# Patient Record
Sex: FEMALE | Race: WHITE | NOT HISPANIC OR LATINO | Employment: OTHER | URBAN - METROPOLITAN AREA
[De-identification: names, ages, dates, MRNs, and addresses within clinical notes are randomized per-mention and may not be internally consistent; named-entity substitution may affect disease eponyms.]

---

## 2017-01-20 ENCOUNTER — TRANSCRIBE ORDERS (OUTPATIENT)
Dept: LAB | Facility: CLINIC | Age: 82
End: 2017-01-20

## 2017-01-20 ENCOUNTER — APPOINTMENT (OUTPATIENT)
Dept: LAB | Facility: CLINIC | Age: 82
End: 2017-01-20
Payer: MEDICARE

## 2017-01-20 ENCOUNTER — GENERIC CONVERSION - ENCOUNTER (OUTPATIENT)
Dept: OTHER | Facility: OTHER | Age: 82
End: 2017-01-20

## 2017-01-20 DIAGNOSIS — I48.91 ATRIAL FIBRILLATION, UNSPECIFIED TYPE (HCC): Primary | ICD-10-CM

## 2017-01-20 LAB
INR PPP: 2.52 (ref 0.86–1.16)
PROTHROMBIN TIME: 26.8 SECONDS (ref 12–14.3)

## 2017-01-20 PROCEDURE — 85610 PROTHROMBIN TIME: CPT

## 2017-01-20 PROCEDURE — 36415 COLL VENOUS BLD VENIPUNCTURE: CPT

## 2017-02-01 ENCOUNTER — GENERIC CONVERSION - ENCOUNTER (OUTPATIENT)
Dept: OTHER | Facility: OTHER | Age: 82
End: 2017-02-01

## 2017-02-21 ENCOUNTER — ALLSCRIPTS OFFICE VISIT (OUTPATIENT)
Dept: OTHER | Facility: OTHER | Age: 82
End: 2017-02-21

## 2017-02-21 DIAGNOSIS — I48.0 PAROXYSMAL ATRIAL FIBRILLATION (HCC): ICD-10-CM

## 2017-02-21 DIAGNOSIS — E78.5 HYPERLIPIDEMIA: ICD-10-CM

## 2017-02-21 DIAGNOSIS — R42 DIZZINESS AND GIDDINESS: ICD-10-CM

## 2017-02-21 DIAGNOSIS — E55.9 VITAMIN D DEFICIENCY: ICD-10-CM

## 2017-02-21 DIAGNOSIS — I10 ESSENTIAL (PRIMARY) HYPERTENSION: ICD-10-CM

## 2017-02-21 DIAGNOSIS — D64.9 ANEMIA: ICD-10-CM

## 2017-02-21 DIAGNOSIS — I49.3 VENTRICULAR PREMATURE DEPOLARIZATION: ICD-10-CM

## 2017-03-08 ENCOUNTER — GENERIC CONVERSION - ENCOUNTER (OUTPATIENT)
Dept: OTHER | Facility: OTHER | Age: 82
End: 2017-03-08

## 2017-03-08 ENCOUNTER — LAB (OUTPATIENT)
Dept: LAB | Facility: CLINIC | Age: 82
End: 2017-03-08
Payer: MEDICARE

## 2017-03-08 ENCOUNTER — TRANSCRIBE ORDERS (OUTPATIENT)
Dept: LAB | Facility: CLINIC | Age: 82
End: 2017-03-08

## 2017-03-08 DIAGNOSIS — I10 ESSENTIAL HYPERTENSION, MALIGNANT: ICD-10-CM

## 2017-03-08 DIAGNOSIS — D64.9 ANEMIA: ICD-10-CM

## 2017-03-08 DIAGNOSIS — E78.5 HYPERLIPIDEMIA, UNSPECIFIED HYPERLIPIDEMIA TYPE: Primary | ICD-10-CM

## 2017-03-08 DIAGNOSIS — E78.5 HYPERLIPIDEMIA: ICD-10-CM

## 2017-03-08 DIAGNOSIS — I48.0 PAROXYSMAL ATRIAL FIBRILLATION (HCC): Primary | ICD-10-CM

## 2017-03-08 DIAGNOSIS — I48.0 PAROXYSMAL ATRIAL FIBRILLATION (HCC): ICD-10-CM

## 2017-03-08 DIAGNOSIS — E55.9 VITAMIN D DEFICIENCY: ICD-10-CM

## 2017-03-08 DIAGNOSIS — R42 DIZZINESS AND GIDDINESS: ICD-10-CM

## 2017-03-08 DIAGNOSIS — I49.3 VENTRICULAR PREMATURE DEPOLARIZATION: ICD-10-CM

## 2017-03-08 DIAGNOSIS — I10 ESSENTIAL (PRIMARY) HYPERTENSION: ICD-10-CM

## 2017-03-08 LAB
25(OH)D3 SERPL-MCNC: 14.5 NG/ML (ref 30–100)
ALBUMIN SERPL BCP-MCNC: 3.7 G/DL (ref 3.5–5)
ALP SERPL-CCNC: 138 U/L (ref 46–116)
ALT SERPL W P-5'-P-CCNC: 24 U/L (ref 12–78)
ANION GAP SERPL CALCULATED.3IONS-SCNC: 9 MMOL/L (ref 4–13)
AST SERPL W P-5'-P-CCNC: 19 U/L (ref 5–45)
BACTERIA UR QL AUTO: NORMAL /HPF
BASOPHILS # BLD AUTO: 0.06 THOUSANDS/ΜL (ref 0–0.1)
BASOPHILS NFR BLD AUTO: 1 % (ref 0–1)
BILIRUB SERPL-MCNC: 0.47 MG/DL (ref 0.2–1)
BILIRUB UR QL STRIP: NEGATIVE
BUN SERPL-MCNC: 21 MG/DL (ref 5–25)
CALCIUM SERPL-MCNC: 8.9 MG/DL (ref 8.3–10.1)
CHLORIDE SERPL-SCNC: 106 MMOL/L (ref 100–108)
CHOLEST SERPL-MCNC: 250 MG/DL (ref 50–200)
CLARITY UR: CLEAR
CO2 SERPL-SCNC: 25 MMOL/L (ref 21–32)
COLOR UR: YELLOW
CREAT SERPL-MCNC: 0.88 MG/DL (ref 0.6–1.3)
DIGOXIN SERPL-MCNC: 0.6 NG/ML (ref 0.8–2)
EOSINOPHIL # BLD AUTO: 0.17 THOUSAND/ΜL (ref 0–0.61)
EOSINOPHIL NFR BLD AUTO: 2 % (ref 0–6)
ERYTHROCYTE [DISTWIDTH] IN BLOOD BY AUTOMATED COUNT: 15.2 % (ref 11.6–15.1)
GFR SERPL CREATININE-BSD FRML MDRD: >60 ML/MIN/1.73SQ M
GLUCOSE SERPL-MCNC: 105 MG/DL (ref 65–140)
GLUCOSE UR STRIP-MCNC: NEGATIVE MG/DL
HCT VFR BLD AUTO: 42.7 % (ref 34.8–46.1)
HDLC SERPL-MCNC: 76 MG/DL (ref 40–60)
HGB BLD-MCNC: 13.8 G/DL (ref 11.5–15.4)
HGB UR QL STRIP.AUTO: ABNORMAL
HYALINE CASTS #/AREA URNS LPF: NORMAL /LPF
INR PPP: 1.86 (ref 0.86–1.16)
KETONES UR STRIP-MCNC: NEGATIVE MG/DL
LDLC SERPL CALC-MCNC: 153 MG/DL (ref 0–100)
LEUKOCYTE ESTERASE UR QL STRIP: NEGATIVE
LYMPHOCYTES # BLD AUTO: 1.7 THOUSANDS/ΜL (ref 0.6–4.47)
LYMPHOCYTES NFR BLD AUTO: 19 % (ref 14–44)
MCH RBC QN AUTO: 29 PG (ref 26.8–34.3)
MCHC RBC AUTO-ENTMCNC: 32.3 G/DL (ref 31.4–37.4)
MCV RBC AUTO: 90 FL (ref 82–98)
MONOCYTES # BLD AUTO: 0.76 THOUSAND/ΜL (ref 0.17–1.22)
MONOCYTES NFR BLD AUTO: 9 % (ref 4–12)
NEUTROPHILS # BLD AUTO: 6.08 THOUSANDS/ΜL (ref 1.85–7.62)
NEUTS SEG NFR BLD AUTO: 69 % (ref 43–75)
NITRITE UR QL STRIP: NEGATIVE
NON-SQ EPI CELLS URNS QL MICRO: NORMAL /HPF
NRBC BLD AUTO-RTO: 0 /100 WBCS
PH UR STRIP.AUTO: 6 [PH] (ref 4.5–8)
PLATELET # BLD AUTO: 260 THOUSANDS/UL (ref 149–390)
PMV BLD AUTO: 11.4 FL (ref 8.9–12.7)
POTASSIUM SERPL-SCNC: 4.3 MMOL/L (ref 3.5–5.3)
PROT SERPL-MCNC: 7.3 G/DL (ref 6.4–8.2)
PROT UR STRIP-MCNC: NEGATIVE MG/DL
PROTHROMBIN TIME: 21.3 SECONDS (ref 12–14.3)
RBC # BLD AUTO: 4.76 MILLION/UL (ref 3.81–5.12)
RBC #/AREA URNS AUTO: NORMAL /HPF
SODIUM SERPL-SCNC: 140 MMOL/L (ref 136–145)
SP GR UR STRIP.AUTO: 1.01 (ref 1–1.03)
TRIGL SERPL-MCNC: 104 MG/DL
TSH SERPL DL<=0.05 MIU/L-ACNC: 2.8 UIU/ML (ref 0.36–3.74)
UROBILINOGEN UR QL STRIP.AUTO: 0.2 E.U./DL
WBC # BLD AUTO: 8.8 THOUSAND/UL (ref 4.31–10.16)
WBC #/AREA URNS AUTO: NORMAL /HPF

## 2017-03-08 PROCEDURE — 81001 URINALYSIS AUTO W/SCOPE: CPT

## 2017-03-08 PROCEDURE — 80053 COMPREHEN METABOLIC PANEL: CPT

## 2017-03-08 PROCEDURE — 84443 ASSAY THYROID STIM HORMONE: CPT

## 2017-03-08 PROCEDURE — 80162 ASSAY OF DIGOXIN TOTAL: CPT

## 2017-03-08 PROCEDURE — 80061 LIPID PANEL: CPT

## 2017-03-08 PROCEDURE — 85025 COMPLETE CBC W/AUTO DIFF WBC: CPT

## 2017-03-08 PROCEDURE — 36415 COLL VENOUS BLD VENIPUNCTURE: CPT

## 2017-03-08 PROCEDURE — 85610 PROTHROMBIN TIME: CPT

## 2017-03-08 PROCEDURE — 82306 VITAMIN D 25 HYDROXY: CPT

## 2017-04-03 ENCOUNTER — APPOINTMENT (OUTPATIENT)
Dept: LAB | Facility: CLINIC | Age: 82
End: 2017-04-03
Payer: MEDICARE

## 2017-04-03 ENCOUNTER — GENERIC CONVERSION - ENCOUNTER (OUTPATIENT)
Dept: OTHER | Facility: OTHER | Age: 82
End: 2017-04-03

## 2017-04-03 ENCOUNTER — LAB CONVERSION - ENCOUNTER (OUTPATIENT)
Dept: OTHER | Facility: OTHER | Age: 82
End: 2017-04-03

## 2017-04-03 DIAGNOSIS — I48.0 PAROXYSMAL ATRIAL FIBRILLATION (HCC): ICD-10-CM

## 2017-04-03 LAB
INR PPP: 1.84 (ref 0.86–1.16)
PROTHROMBIN TIME: 21.1 SECONDS (ref 12–14.3)

## 2017-04-03 PROCEDURE — 36415 COLL VENOUS BLD VENIPUNCTURE: CPT

## 2017-04-03 PROCEDURE — 85610 PROTHROMBIN TIME: CPT

## 2017-05-08 ENCOUNTER — GENERIC CONVERSION - ENCOUNTER (OUTPATIENT)
Dept: OTHER | Facility: OTHER | Age: 82
End: 2017-05-08

## 2017-05-08 ENCOUNTER — TRANSCRIBE ORDERS (OUTPATIENT)
Dept: LAB | Facility: CLINIC | Age: 82
End: 2017-05-08

## 2017-05-08 ENCOUNTER — APPOINTMENT (OUTPATIENT)
Dept: LAB | Facility: CLINIC | Age: 82
End: 2017-05-08
Payer: MEDICARE

## 2017-05-08 DIAGNOSIS — I48.0 PAROXYSMAL ATRIAL FIBRILLATION (HCC): Primary | ICD-10-CM

## 2017-05-08 DIAGNOSIS — I48.0 PAROXYSMAL ATRIAL FIBRILLATION (HCC): ICD-10-CM

## 2017-05-08 LAB
INR PPP: 1.57 (ref 0.86–1.16)
PROTHROMBIN TIME: 18.9 SECONDS (ref 12.1–14.4)

## 2017-05-08 PROCEDURE — 36415 COLL VENOUS BLD VENIPUNCTURE: CPT

## 2017-05-08 PROCEDURE — 85610 PROTHROMBIN TIME: CPT

## 2017-05-09 ENCOUNTER — GENERIC CONVERSION - ENCOUNTER (OUTPATIENT)
Dept: OTHER | Facility: OTHER | Age: 82
End: 2017-05-09

## 2017-05-16 ENCOUNTER — ALLSCRIPTS OFFICE VISIT (OUTPATIENT)
Dept: OTHER | Facility: OTHER | Age: 82
End: 2017-05-16

## 2017-06-11 ENCOUNTER — APPOINTMENT (EMERGENCY)
Dept: RADIOLOGY | Facility: HOSPITAL | Age: 82
End: 2017-06-11
Payer: MEDICARE

## 2017-06-11 ENCOUNTER — HOSPITAL ENCOUNTER (EMERGENCY)
Facility: HOSPITAL | Age: 82
Discharge: HOME | End: 2017-06-11
Attending: EMERGENCY MEDICINE
Payer: MEDICARE

## 2017-06-11 VITALS
SYSTOLIC BLOOD PRESSURE: 136 MMHG | DIASTOLIC BLOOD PRESSURE: 87 MMHG | WEIGHT: 132.06 LBS | RESPIRATION RATE: 17 BRPM | TEMPERATURE: 98.9 F | OXYGEN SATURATION: 95 % | HEART RATE: 82 BPM

## 2017-06-11 DIAGNOSIS — R00.2 PALPITATIONS: Primary | ICD-10-CM

## 2017-06-11 DIAGNOSIS — I48.91 ATRIAL FIBRILLATION (HCC): ICD-10-CM

## 2017-06-11 LAB
ANION GAP SERPL CALCULATED.3IONS-SCNC: 10 MMOL/L (ref 4–13)
BASOPHILS # BLD AUTO: 0.06 THOUSANDS/ΜL (ref 0–0.1)
BASOPHILS NFR BLD AUTO: 1 % (ref 0–1)
BUN SERPL-MCNC: 23 MG/DL (ref 5–25)
CALCIUM SERPL-MCNC: 8.6 MG/DL (ref 8.3–10.1)
CHLORIDE SERPL-SCNC: 108 MMOL/L (ref 100–108)
CO2 SERPL-SCNC: 25 MMOL/L (ref 21–32)
CREAT SERPL-MCNC: 0.9 MG/DL (ref 0.6–1.3)
DIGOXIN SERPL-MCNC: 0.4 NG/ML (ref 0.8–2)
EOSINOPHIL # BLD AUTO: 0.3 THOUSAND/ΜL (ref 0–0.61)
EOSINOPHIL NFR BLD AUTO: 4 % (ref 0–6)
ERYTHROCYTE [DISTWIDTH] IN BLOOD BY AUTOMATED COUNT: 15.2 % (ref 11.6–15.1)
GFR SERPL CREATININE-BSD FRML MDRD: 59.4 ML/MIN/1.73SQ M
GLUCOSE SERPL-MCNC: 113 MG/DL (ref 65–140)
HCT VFR BLD AUTO: 41.6 % (ref 34.8–46.1)
HGB BLD-MCNC: 14.1 G/DL (ref 11.5–15.4)
LYMPHOCYTES # BLD AUTO: 2.26 THOUSANDS/ΜL (ref 0.6–4.47)
LYMPHOCYTES NFR BLD AUTO: 28 % (ref 14–44)
MCH RBC QN AUTO: 29.9 PG (ref 26.8–34.3)
MCHC RBC AUTO-ENTMCNC: 33.9 G/DL (ref 31.4–37.4)
MCV RBC AUTO: 88 FL (ref 82–98)
MONOCYTES # BLD AUTO: 0.74 THOUSAND/ΜL (ref 0.17–1.22)
MONOCYTES NFR BLD AUTO: 9 % (ref 4–12)
NEUTROPHILS # BLD AUTO: 4.65 THOUSANDS/ΜL (ref 1.85–7.62)
NEUTS SEG NFR BLD AUTO: 58 % (ref 43–75)
NRBC BLD AUTO-RTO: 0 /100 WBCS
PLATELET # BLD AUTO: 281 THOUSANDS/UL (ref 149–390)
PMV BLD AUTO: 12.1 FL (ref 8.9–12.7)
POTASSIUM SERPL-SCNC: 4.1 MMOL/L (ref 3.5–5.3)
RBC # BLD AUTO: 4.71 MILLION/UL (ref 3.81–5.12)
SODIUM SERPL-SCNC: 143 MMOL/L (ref 136–145)
SPECIMEN SOURCE: NORMAL
SPECIMEN SOURCE: NORMAL
TROPONIN I BLD-MCNC: 0 NG/ML (ref 0–0.08)
TROPONIN I BLD-MCNC: 0 NG/ML (ref 0–0.08)
WBC # BLD AUTO: 8.01 THOUSAND/UL (ref 4.31–10.16)

## 2017-06-11 PROCEDURE — 80162 ASSAY OF DIGOXIN TOTAL: CPT | Performed by: EMERGENCY MEDICINE

## 2017-06-11 PROCEDURE — 99285 EMERGENCY DEPT VISIT HI MDM: CPT

## 2017-06-11 PROCEDURE — 71020 HB CHEST X-RAY 2VW FRONTAL&LATL: CPT

## 2017-06-11 PROCEDURE — 84484 ASSAY OF TROPONIN QUANT: CPT

## 2017-06-11 PROCEDURE — 93005 ELECTROCARDIOGRAM TRACING: CPT | Performed by: EMERGENCY MEDICINE

## 2017-06-11 PROCEDURE — 85025 COMPLETE CBC W/AUTO DIFF WBC: CPT | Performed by: EMERGENCY MEDICINE

## 2017-06-11 PROCEDURE — 80048 BASIC METABOLIC PNL TOTAL CA: CPT | Performed by: EMERGENCY MEDICINE

## 2017-06-11 PROCEDURE — 36415 COLL VENOUS BLD VENIPUNCTURE: CPT | Performed by: EMERGENCY MEDICINE

## 2017-06-11 RX ORDER — BISOPROLOL FUMARATE 5 MG/1
TABLET ORAL
COMMUNITY
Start: 2016-05-10 | End: 2018-03-13 | Stop reason: SDUPTHER

## 2017-06-11 RX ORDER — DIGOXIN 125 MCG
TABLET ORAL
COMMUNITY
Start: 2012-01-05 | End: 2019-03-09 | Stop reason: SDUPTHER

## 2017-06-11 RX ORDER — PRAVASTATIN SODIUM 20 MG
TABLET ORAL
COMMUNITY
Start: 2012-01-31 | End: 2018-12-11 | Stop reason: SDUPTHER

## 2017-06-11 RX ORDER — OMEPRAZOLE 40 MG/1
CAPSULE, DELAYED RELEASE ORAL
COMMUNITY
Start: 2015-06-16 | End: 2018-09-26 | Stop reason: SDUPTHER

## 2017-06-12 LAB
QRS AXIS: 27 DEGREES
QRS AXIS: 35 DEGREES
QRSD INTERVAL: 74 MS
QRSD INTERVAL: 76 MS
QT INTERVAL: 334 MS
QT INTERVAL: 370 MS
QTC INTERVAL: 397 MS
QTC INTERVAL: 402 MS
T WAVE AXIS: 60 DEGREES
T WAVE AXIS: 61 DEGREES
VENTRICULAR RATE: 71 BPM
VENTRICULAR RATE: 85 BPM

## 2017-06-23 DIAGNOSIS — R53.83 OTHER FATIGUE: ICD-10-CM

## 2017-06-28 ENCOUNTER — APPOINTMENT (OUTPATIENT)
Dept: LAB | Facility: CLINIC | Age: 82
End: 2017-06-28
Payer: MEDICARE

## 2017-06-28 ENCOUNTER — TRANSCRIBE ORDERS (OUTPATIENT)
Dept: LAB | Facility: CLINIC | Age: 82
End: 2017-06-28

## 2017-06-28 DIAGNOSIS — R53.83 OTHER FATIGUE: ICD-10-CM

## 2017-06-28 LAB
ERYTHROCYTE [DISTWIDTH] IN BLOOD BY AUTOMATED COUNT: 15.1 % (ref 11.6–15.1)
HCT VFR BLD AUTO: 40.5 % (ref 34.8–46.1)
HGB BLD-MCNC: 12.9 G/DL (ref 11.5–15.4)
MCH RBC QN AUTO: 28.8 PG (ref 26.8–34.3)
MCHC RBC AUTO-ENTMCNC: 31.9 G/DL (ref 31.4–37.4)
MCV RBC AUTO: 90 FL (ref 82–98)
PLATELET # BLD AUTO: 250 THOUSANDS/UL (ref 149–390)
PMV BLD AUTO: 11.6 FL (ref 8.9–12.7)
RBC # BLD AUTO: 4.48 MILLION/UL (ref 3.81–5.12)
WBC # BLD AUTO: 8.21 THOUSAND/UL (ref 4.31–10.16)

## 2017-06-28 PROCEDURE — 85027 COMPLETE CBC AUTOMATED: CPT

## 2017-06-28 PROCEDURE — 36415 COLL VENOUS BLD VENIPUNCTURE: CPT

## 2017-07-03 ENCOUNTER — GENERIC CONVERSION - ENCOUNTER (OUTPATIENT)
Dept: OTHER | Facility: OTHER | Age: 82
End: 2017-07-03

## 2017-08-22 ENCOUNTER — ALLSCRIPTS OFFICE VISIT (OUTPATIENT)
Dept: OTHER | Facility: OTHER | Age: 82
End: 2017-08-22

## 2017-09-01 DIAGNOSIS — E78.5 HYPERLIPIDEMIA: ICD-10-CM

## 2017-09-01 DIAGNOSIS — I48.0 PAROXYSMAL ATRIAL FIBRILLATION (HCC): ICD-10-CM

## 2017-09-12 ENCOUNTER — GENERIC CONVERSION - ENCOUNTER (OUTPATIENT)
Dept: OTHER | Facility: OTHER | Age: 82
End: 2017-09-12

## 2017-09-18 ENCOUNTER — GENERIC CONVERSION - ENCOUNTER (OUTPATIENT)
Dept: OTHER | Facility: OTHER | Age: 82
End: 2017-09-18

## 2017-11-16 ENCOUNTER — TRANSCRIBE ORDERS (OUTPATIENT)
Dept: LAB | Facility: HOSPITAL | Age: 82
End: 2017-11-16

## 2017-11-16 DIAGNOSIS — E78.5 HYPERLIPIDEMIA, UNSPECIFIED HYPERLIPIDEMIA TYPE: Primary | ICD-10-CM

## 2017-11-20 ENCOUNTER — APPOINTMENT (OUTPATIENT)
Dept: LAB | Facility: HOSPITAL | Age: 82
End: 2017-11-20
Attending: INTERNAL MEDICINE
Payer: MEDICARE

## 2017-11-20 DIAGNOSIS — E78.5 HYPERLIPIDEMIA, UNSPECIFIED HYPERLIPIDEMIA TYPE: ICD-10-CM

## 2017-11-20 DIAGNOSIS — I48.0 PAROXYSMAL ATRIAL FIBRILLATION (HCC): ICD-10-CM

## 2017-11-20 DIAGNOSIS — E78.5 HYPERLIPIDEMIA: ICD-10-CM

## 2017-11-20 LAB
AST SERPL W P-5'-P-CCNC: 20 U/L (ref 5–45)
DIGOXIN SERPL-MCNC: 0.6 NG/ML (ref 0.8–2)
LDLC SERPL DIRECT ASSAY-MCNC: 122 MG/DL (ref 0–100)
VENIPUNCTURE: NORMAL

## 2017-11-20 PROCEDURE — 80162 ASSAY OF DIGOXIN TOTAL: CPT

## 2017-11-20 PROCEDURE — 84450 TRANSFERASE (AST) (SGOT): CPT

## 2017-11-20 PROCEDURE — 36415 COLL VENOUS BLD VENIPUNCTURE: CPT

## 2017-11-20 PROCEDURE — 83721 ASSAY OF BLOOD LIPOPROTEIN: CPT

## 2017-11-24 ENCOUNTER — GENERIC CONVERSION - ENCOUNTER (OUTPATIENT)
Dept: OTHER | Facility: OTHER | Age: 82
End: 2017-11-24

## 2017-12-01 ENCOUNTER — ALLSCRIPTS OFFICE VISIT (OUTPATIENT)
Dept: OTHER | Facility: OTHER | Age: 82
End: 2017-12-01

## 2017-12-05 NOTE — PROGRESS NOTES
Assessment  Assessed    1  Paroxysmal atrial fibrillation (427 31) (I48 0)   2  Palpitations (785 1) (R00 2)   3  Hypertension (401 9) (I10)   4  Hyperlipidemia (272 4) (E78 5)    Plan  Paroxysmal atrial fibrillation    · Follow-up visit in 6 months Evaluation and Treatment  Follow-up  Status: Hold For -Scheduling  Requested for: 05CVW8785   Ordered;Paroxysmal atrial fibrillation; Ordered By: Travis Gilmore Performed:  Due: 97AGN1517    Discussion/Summary  Cardiology Discussion Summary Free Text Note Form [facility]: It is my impression the patient has labile blood pressure with a fair amount of symptoms when her blood pressure is low normal  She will continue bisoprolol prn SBP >150  She is not having many palpitations currently  She will continue pravastatin for hyperlipidemia  Her LDL was reasonable at 122  Her digoxin level was 0 6  She will continue Eliquis 2 5 mg BID for stroke prevention  I will see her again in 6 months time  Chief Complaint  Chief Complaint Free Text Note Form: The patient is here for a six-month followup for her paroxysmal A  fib, hypertension, hyperlipidemia and palpitations  Chief Complaint Chronic Condition [facility]: Patient is here today for follow up of chronic conditions described in HPI  History of Present Illness  Cardiology HPI Free Text Note Form [facility]: She does get palpitations which are rare  She does not feel her HR even if it in the low 100s  She is using bisoprolol prn BP>150  She is taking it about 2-3x per week  Her retinal exam did show some bleeding indicating an elevated BP  She denies chest pain or SOB  He denies much in the way of edema  Review of Systems  Cardiology Female ROS:    Cardiac: rhythm problems-- and-- palpitations present , but-- no chest pain,-- no fainting/blackouts,-- no heart murmur present-- and-- no signs of swelling  Skin: No complaints of nonhealing sores or skin rash    Genitourinary: frequent urination at night-- and-- post menopausal, but-- no recurrent urinary tract infections,-- no difficult urination,-- no blood in urine,-- no kidney stones,-- no loss of bladder control,-- no kidney problems,-- no birth control or hormone replacement therapy-- and-- not pregnant  Psychological: No complaints of feeling depressed, anxiety, panic attacks, or difficulty concentrating  General: lack of energy/fatigue, but-- No complaints of trouble sleeping, lack of energy, fatigue, appetite changes, weight changes, fever, frequent infections, or night sweats  Respiratory: No complaints of shortness of breath, cough with sputum, or wheezing  HEENT: serious eye problems-- and-- hearing problems, but-- no nose problems,-- no throat problems-- and-- no snoring  Gastrointestinal: No complaints of liver problems, nausea, vomiting, heartburn, constipation, bloody stools, diarrhea, problems swallowing, adbominal pain, or rectal bleeding  Hematologic: No complaints of bleeding disorders, anemia, blood clots, or excessive brusing  Neurological: No complaints of numbness, tingling, dizziness, weakness, seizures, headaches, syncope or fainting, AM fatigue, daytime sleepiness, no witnessed apnea episodes  Musculoskeletal: arthritis, but-- no back pain   ROS Reviewed:   ROS reviewed  Active Problems  Problems    1  Abnormal movement in bone (733 90) (M89 9)   2  Acute pain (338 19) (R52)   3  Alopecia (704 00) (L65 9)   4  Anemia (285 9) (D64 9)   5  Asthma (493 90) (J45 909)   6  Bilateral carpal tunnel syndrome (354 0) (G56 03)   7  Diarrhea (787 91) (R19 7)   8  Edema (782 3) (R60 9)   9  Esophageal reflux (530 81) (K21 9)   10  Fatigue (780 79) (R53 83)   11  Fecal occult blood test positive (792 1) (R19 5)   12  Flu vaccine need (V04 81) (Z23)   13  Hyperlipidemia (272 4) (E78 5)   14  Hypertension (401 9) (I10)   15  Influenza vaccine needed (V04 81) (Z23)   16  Lightheadedness (780 4) (R42)   17  Palpitations (785 1) (R00 2)   18   Paroxysmal atrial fibrillation (427 31) (I48 0)   19  Premature ventricular contraction (427 69) (I49 3)   20  Vitamin D deficiency (268 9) (E55 9)    Past Medical History  Problems    1  History of Fracture Of The Ankle (824 8)   2  History of Osteoarthritis (V13 4)   3  Screening for genitourinary condition (V81 6) (Z13 89)   4  Screening for other and unspecified genitourinary condition (V81 6) (Z13 89)  Active Problems And Past Medical History Reviewed: The active problems and past medical history were reviewed and updated today  Surgical History  Problems    1  History of Pelvic Repair  Surgical History Reviewed: The surgical history was reviewed and updated today  Family History  Family History    1  Family history of supraventricular tachycardia (V17 49) (Z82 49)  Family History Reviewed: The family history was reviewed and updated today  Social History  Problems    · Being A Social Drinker   · Never a smoker  Social History Reviewed: The social history was reviewed and updated today  The social history was reviewed and is unchanged  Current Meds   1  Advair Diskus 250-50 MCG/DOSE Inhalation Aerosol Powder Breath Activated; INHALE ONE PUFF BY MOUTH TWICE A DAY; Therapy: 55MKD4061 to (Evaluate:19Mar2017)  Requested for: 12ETW1011; Last Rx:20Oct2016 Ordered   2  Bisoprolol Fumarate 5 MG Oral Tablet; TAKE ONE-HALF (1/2) TABLET DAILY AS NEEDED FOR PALPITATIONS; Therapy: 73OYL7651 to (Last Rx:54Rcq8650)  Requested for: 07Vhf0389 Ordered   3  Digoxin 125 MCG Oral Tablet; take 1 tablet by mouth one time daily; Therapy: 47LVC2282 to (Evaluate:15Oct2017)  Requested for: 95CXF3682; Last Rx:20Oct2016 Ordered   4  Eliquis 2 5 MG Oral Tablet; One Tablet PO BID; Therapy: 04XGS5853 to (Evaluate:11May2018)  Requested for: 43QAG7485; Last Rx:16May2017 Ordered   5  Fluticasone Propionate 50 MCG/ACT Nasal Suspension; USE 1 TO 2 SPRAYS IN EACH NOSTRIL ONCE DAILY;  Therapy: 40VDW2085 to (Evaluate:15Oct2017) Requested for: 17DMC0670; Last Rx:74Iqo5980 Ordered   6  HydroCHLOROthiazide 12 5 MG Oral Tablet; take 1 tablet daily prn; Therapy: 16Efm1084 to (Last Rx:61Jpm2332)  Requested for: 14FUP0462 Ordered   7  Omeprazole 40 MG Oral Capsule Delayed Release; 1 tablet daily; Therapy: 98XQW7589 to (Evaluate:38Qkc6043)  Requested for: 81VSZ4545; Last Rx:82Amx2615 Ordered   8  Pravastatin Sodium 20 MG Oral Tablet; TAKE 1 TABLET DAILY; Therapy: 08VQU9412 to 062 439 31 49)  Requested for: 06ZIF6476; Last Rx:61Jvw9666 Ordered  Medication List Reviewed: The medication list was reviewed and updated today  Allergies  Medication    1  No Known Drug Allergies    Vitals  Vital Signs    Recorded: 99FLG3257 11:23AM   Heart Rate 55   Systolic 499   Diastolic 80   Weight 974 lb 8 oz   BMI Calculated 22 59   BSA Calculated 1 6       Physical Exam   Constitutional  General appearance: No acute distress, well appearing and well nourished  Eyes  Conjunctiva and Sclera examination: Conjunctiva pink, sclera anicteric  Ears, Nose, Mouth, and Throat - Oropharynx: Clear, nares are clear, mucous membranes are moist   Neck  Neck and thyroid: Normal, supple, trachea midline, no thyromegaly  Pulmonary  Auscultation of lungs: Clear to auscultation, no rales, no rhonchi, no wheezing, good air movement  Cardiovascular  Auscultation of heart: Abnormal  -- premature beats with Grade 1 systolic murmur at the base  No diastolic murmur rub or gallop noted  Carotid pulses: Normal, 2+ bilaterally  Pedal pulses: Normal, 2+ bilaterally  Examination of extremities for edema and/or varicosities: Abnormal  -- trace to +1 edema of the LEs  Abdomen  Abdomen: Non-tender and no distention  Liver and spleen: No hepatomegaly or splenomegaly  Musculoskeletal Gait and station: Normal gait  -- Digits and nails: Normal without clubbing or cyanosis  -- Inspection/palpation of joints, bones, and muscles: Abnormal -- kyphosis    Skin - Skin and subcutaneous tissue: Normal without rashes or lesions  Skin is warm and well perfused, normal turgor  Neurologic - Cranial nerves: II - XII intact  -- Sensation: No sensory loss  Psychiatric - Orientation to person, place, and time: Normal -- Mood and affect: Normal       Future Appointments    Date/Time Provider Specialty Site   01/04/2018 10:40 AM DESTINEE Fernandes   Internal Medicine [facility]       Signatures   Electronically signed by : DESTINEE Randall ; Dec  1 2017 12:12PM EST                       (Author)

## 2018-01-10 NOTE — PROGRESS NOTES
REPORT NAME: Patient Visit Summary Report   VISIT DATE: 4/19/2016  VISIT TIME: 10:56 AM EDT  PATIENT NAME: Ruthie Becker  MEDICAL RECORD NUMBER: [id number]  SOCIAL SECURITY NUMBER:   YOB: 1930  AGE: 80  REFERRING PHYSICIAN: Jennifer Talamantes MD  SUPERVISING CLINICIAN: Jennifer Talamantes MD  HEALTH CARE PROFESSIONAL: Lloyd Reinoso  PATIENT HOME ADDRESS: [address] [place] [address] Road PHONE: [phone]  DIAGNOSIS 1: Unspecified atrial fibrillation / I48 91  DIAGNOSIS 2:   DIAGNOSIS 3:   DIAGNOSIS 4:   INR RANGE: 2 - 3  INR GOAL: 2 5  TREATMENT START DATE: 1/26/2012  TREATMENT END DATE:   NEXT VISIT: 5/17/2016  [facility]    VISIT RESULTS   ENCOUNTER NUMBER:   TEST LOCATION: [facility]  TEST TYPE: Outside Lab (Venipuncture)  VISIT TYPE: Phone Consult  CURRENT INR: 2 01 PROTIME: 21 9  SPECIMEN COL AND RPT DATE: 4/19/2016 10:56  AM EDT    VITAL SIGNS  PULSE:  B/P:  WEIGHT:  HEIGHT:  TEMP:     CURRENT ANTICOAGULANT DOSING SCHEDULE  DOSE SIZE: 5mg    ANTICOAGULANT TYPE: WARFARIN  DOSING REGIMEN  Sun       Mon Tues Wed Thurs Fri       Sat  Total/Wk  2 5       5         2 5       5         2 5       2 5       5         25    PATIENT MEDICATION INSTRUCTION: Yes  PATIENT NUTRITIONAL COUNSELING: No  PATIENT BRUISING INSTRUCTION: Yes      LAST EDUCATION DATE:       PREVIOUS VISIT INFORMATION  VISITDATE   INR Goal  INR   Sun     Mon Tues Wed Thurs   Fri  Sat     Total/wk  4/19/2016   2 5       2 01  2 5     5       2 5     5       2 5     2 5  5       25  3/22/2016   2 5       2 22  2 5     5       2 5     5       2 5     2 5  5       25  2/19/2016   2 5       2 01  2 5     5       2 5     5       2 5     2 5  5       25  2/4/2016    2 5       1 97  2 5     5       2 5     5       2 5     2 5  5       25    ADDITIONAL PREVIOUS VISIT INFORMATION  VISITDATE   PRIMARY RX               DOSE      CrCl  4/19/2016   WARFARIN 5mg                 3/22/2016   WARFARIN                 5mg                 2/19/2016   WARFARIN                 5mg                 2/4/2016    WARFARIN                 5mg                     OTHER CURRENT MEDICATIONS: WARFARIN      PROGRESS NOTES:     PATIENT INSTRUCTIONS: 4/20/16, tc pt, cont current dose, rech 4 weeks,  5/17  [facility]  TEST LOCATION: [facility]    Electronically signed by:  London Oreilly on 4/20/2016 at 10:56 AM EDT

## 2018-01-10 NOTE — PROGRESS NOTES
REPORT NAME: Patient Visit Summary Report   VISIT DATE: 2/4/2016  VISIT TIME: 3:32 PM EST  PATIENT NAME: Tomás Robbins  MEDICAL RECORD NUMBER: [id number]  SOCIAL SECURITY NUMBER:   YOB: 1930  AGE: 80  REFERRING PHYSICIAN: Darryl Gordon MD  SUPERVISING CLINICIAN: Darryl Gordon MD  HEALTH CARE PROFESSIONAL: Romana Pih [facility]  PATIENT HOME ADDRESS: [address] [place] [address], [place], [facility] [address] PHONE: [phone]  DIAGNOSIS 1: Unspecified atrial fibrillation / I48 91  DIAGNOSIS 2:   DIAGNOSIS 3:   DIAGNOSIS 4:   INR RANGE: 2 - 3  INR GOAL: 2 5  TREATMENT START DATE: 1/26/2012  TREATMENT END DATE:   NEXT VISIT: 2/18/2016  [facility]    VISIT RESULTS   ENCOUNTER NUMBER:   TEST LOCATION: [facility]  TEST TYPE: Outside Lab (Venipuncture)  VISIT TYPE: Phone Consult  CURRENT INR: 1 97 PROTIME: 21 6  SPECIMEN COL AND RPT DATE: 2/4/2016 3:32  PM EST    VITAL SIGNS  PULSE:  B/P:  WEIGHT:  HEIGHT:  TEMP:     CURRENT ANTICOAGULANT DOSING SCHEDULE  DOSE SIZE: 5mg    ANTICOAGULANT TYPE: WARFARIN  DOSING REGIMEN  Sun       Mon       Tues      Wed       Thurs     Fri       Sat  Total/Wk  2 5       5         2 5       5         2 5       2 5       5         25    PATIENT MEDICATION INSTRUCTION: Yes  PATIENT NUTRITIONAL COUNSELING: No  PATIENT BRUISING INSTRUCTION: No      LAST EDUCATION DATE:       PREVIOUS VISIT INFORMATION  VISITDATE   INR Goal  INR   Sun     Mon     Tues    Wed     Thurs   Fri  Sat     Total/wk  2/4/2016    2 5       1 97  2 5     5       2 5     5       2 5     2 5  5       25  1/8/2016    2 5       2 45  2 5     5       2 5     5       2 5     2 5  5       25  12/3/2015   2 5       2 09  2 5     5       2 5     5       2 5     2 5  5       25  11/18/2015  2 5       2 24  2 5     5       2 5     5       2 5     2 5  5       25    ADDITIONAL PREVIOUS VISIT INFORMATION  VISITDATE   PRIMARY RX               DOSE      CrCl  2/4/2016    WARFARIN 5mg                 1/8/2016    WARFARIN                 5mg                 12/3/2015   WARFARIN                 5mg                 11/18/2015  WARFARIN                 5mg                     OTHER CURRENT MEDICATIONS: WARFARIN      PROGRESS NOTES:     PATIENT INSTRUCTIONS: 2/12/16, inr results from 2/4/16 were not received  tc pt, cont current dose, rech 1 week, 2/18  [facility]  TEST LOCATION: [facility]    Electronically signed by:  Chalo Oreilly on 2/12/2016 at 3:32 PM EST

## 2018-01-11 NOTE — PROGRESS NOTES
REPORT NAME: Progress Notes Report  VISIT DATE: 5/8/2017  VISIT TIME: 8:52 AM EDT  PATIENT NAME: Milly Love  MEDICAL RECORD NUMBER: [id number]  YOB: 1930  AGE: 80  REFERRING PHYSICIAN: Carlee Hood MD  SUPERVISING CLINICIAN: Carlee Hood MD  HEALTH CARE PROVIDER: Nyla Andrews [facility]  PATIENT HOME ADDRESS: [address] [place] [address], [address] [place] [address] Street PHONE: [phone]  SOCIAL SECURITY NUMBER:   DIAGNOSIS 1: Unspecified atrial fibrillation / I48 91  DIAGNOSIS 2:   INR RANGE: 2 - 3  INR GOAL: 2 5  TREATMENT START DATE: 1/26/2012  TREATMENT END DATE:   NEXT VISIT: 5/23/2017  [facility]  VISIT RESULTS  ENCOUNTER NUMBER:   TEST LOCATION: [facility]  TEST TYPE: Outside Lab (Venipuncture)  VISIT TYPE: Phone Consult  CURRENT INR: 1 57 PROTIME: 18 9  SPECIMEN COL AND RPT DATE: 5/8/2017 8:52  AM EDT  VITAL SIGNS  PULSE:  BP: / WEIGHT:  HEIGHT:  TEMP:   CURRENT ANTICOAGULANT DOSING SCHEDULE  DOSE SIZE: 5mg    ANTICOAGULANT TYPE: WARFARIN  DOSING REGIMEN  Sun       Mon       Tues      Wed       Thurs     Fri       Sat  Total/Wk  2 5       5         5         5         2 5       5         5         30  PATIENT MEDICATION INSTRUCTION: Yes  PATIENT NUTRITIONAL COUNSELING: No  PATIENT BRUISING INSTRUCTION: No  LAST EDUCATION DATE:   PREVIOUS VISIT INFORMATION  VISITDATE  INRGoal INR   Sun    Mon Tues Wed Thurs Fri    Sat  Total/wk  5/8/2017    2 5     1 57  2 5    5      5      5      2 5    5      5  30  4/3/2017    2 5     1 84  2 5    5      2 5    5      2 5    5      5  27 5  3/8/2017    2 5     1 86  2 5    5      2 5    5      2 5    2 5    5  25  1/20/2017   2 5     2 52  2 5    5      2 5    5      2 5    2 5    5  25  ADDITIONAL PREVIOUS VISIT INFORMATION  VISITDATE   PRIMARY RX               DOSE      CrCl  5/8/2017    WARFARIN                 5mg  4/3/2017    WARFARIN                 5mg  3/8/2017    WARFARIN 5mg  1/20/2017   WARFARIN                 5mg  OTHER CURRENT MEDICATIONS:  WARFARIN  PROGRESS NOTES:   PATIENT INSTRUCTIONS: 5/9/17, tc pt, eating asparagus and salads  will  increase dose, 2 5 mg sun/th, 5 mg others, rech 2 weeks, 5/23  [facility]   ---- Additional Instructions entered on 5/17/2017 11:48 AM EDT by Debrah Prader Perinotti :  5/17/17, pt seen at [facility] 5/16/17 with pg, warfarin stopped ,  started on eliquis  [facility]  TEST LOCATION: [place] [address], , ,   INBOUND LAB DATA:  Lab       Lab Value Col Date                 Rpt Date                 Lab  Reference Range  Electronically signed by:  Debrah Prader Perinotti on 5/17/2017 11:48 AM EDT

## 2018-01-11 NOTE — PROGRESS NOTES
REPORT NAME: Patient Visit Summary Report   VISIT DATE: 9/13/2016  VISIT TIME: 10:27 AM EDT  PATIENT NAME: Max Price  MEDICAL RECORD NUMBER: [id number]  SOCIAL SECURITY NUMBER:   YOB: 1930  AGE: 80  REFERRING PHYSICIAN: Coral Shen MD  SUPERVISING CLINICIAN: Coral Shen MD  HEALTH CARE PROFESSIONAL: Janna Meigs [address]  PATIENT HOME ADDRESS: [address] [place] [address] PHONE: [phone]  DIAGNOSIS 1: Unspecified atrial fibrillation / I48 91  DIAGNOSIS 2:   DIAGNOSIS 3:   DIAGNOSIS 4:   INR RANGE: 2 - 3  INR GOAL: 2 5  TREATMENT START DATE: 1/26/2012  TREATMENT END DATE:   NEXT VISIT: 10/11/2016  [facility]    VISIT RESULTS   ENCOUNTER NUMBER:   TEST LOCATION: [facility]  TEST TYPE: Outside Lab (Venipuncture)  VISIT TYPE: Phone Consult  CURRENT INR: 2 96 PROTIME: 30 3  SPECIMEN COL AND RPT DATE: 9/13/2016 10:27  AM EDT    VITAL SIGNS  PULSE:  B/P:  WEIGHT:  HEIGHT:  TEMP:     CURRENT ANTICOAGULANT DOSING SCHEDULE  DOSE SIZE: 5mg    ANTICOAGULANT TYPE: WARFARIN  DOSING REGIMEN  Sun       Mon       Tues      Wed       Thurs     Fri       Sat  Total/Wk  2 5       5         2 5       5         2 5       2 5       5         25    PATIENT MEDICATION INSTRUCTION: Yes  PATIENT NUTRITIONAL COUNSELING: No  PATIENT BRUISING INSTRUCTION: No      LAST EDUCATION DATE:       PREVIOUS VISIT INFORMATION  VISITDATE   INR Goal  INR   Sun     Mon     Tues    Wed     Thurs   Fri  Sat     Total/wk  9/13/2016   2 5       2 96  2 5     5       2 5     5       2 5     2 5  5       25  8/11/2016   2 5       2 06  2 5     5       2 5     5       2 5     2 5  5       25  7/12/2016   2 5       2 08  2 5     5       2 5     5       2 5     2 5  5       25  6/3/2016    2 5       2 04  2 5     5       2 5     5       2 5     2 5  5       25    ADDITIONAL PREVIOUS VISIT INFORMATION  VISITDATE   PRIMARY RX               DOSE      CrCl  9/13/2016   WARFARIN 5mg                 8/11/2016   WARFARIN                 5mg                 7/12/2016   WARFARIN                 5mg                 6/3/2016    WARFARIN                 5mg                     OTHER CURRENT MEDICATIONS: WARFARIN      PROGRESS NOTES:     PATIENT INSTRUCTIONS: 9/14/16, tc pt, reports she accidentally took 5 mg in  place of 2 5 mg on fri   will cont current dose, rech 4 weeks, 10/11  [facility]  TEST LOCATION: [facility]    Electronically signed by:  Zuleika Oreilly on 9/14/2016 at 10:27 AM EDT

## 2018-01-11 NOTE — RESULT NOTES
Verified Results  (1) CBC/ PLT (NO DIFF) [id number] 01:21PM Ashley Simon Order Number: [id number]     Test Name Result Flag Reference   HEMATOCRIT 40 5 %  34 8-46 1   HEMOGLOBIN 12 9 g/dL  11 5-15 4   MCHC 31 9 g/dL  31 4-37 4   MCH 28 8 pg  26 8-34 3   MCV 90 fL  82-98   PLATELET COUNT 293 Thousands/uL  149-390   RBC COUNT 4 48 Million/uL  3 81-5 12   RDW 15 1 %  11 6-15 1   WBC COUNT 8 21 Thousand/uL  4 31-10 16   MPV 11 6 fL  8 9-12 7

## 2018-01-11 NOTE — MISCELLANEOUS
Message    Date: 27 Apr 2016 10:41 AM EST, Recorded By: Jeb Erumholli Strauss, Self   Phone: [phone] (Home), [phone] (Work)   PC from patient who states her BP has been fluctuating up and down  One day it is up over 429 systolic and she must take 1/2 BP medication and then the next day it is 88 systolic and she becomes dizzy and fatigued  She is concerned that it is so different each day  I recommended she take her BP twice a day, keep a log, and when it get low to hydrate and a few salty chips or pretzels  She admits she never drinks much water, and has to remind herself to drink  The low BP's have been in the am, just getting out of bed  The high BP's are only occasionally  She is due for an appt in June, so I sent her to  to make, due to lack of availability, with Dr Maxwell [place] schedule  She will keep a diary of BP's and   heart 1  rate for two weeks and get back to us  She was just seen on 3/22/26 at PCP for the same complaints  1          1 Amended By: Magen Desai; Apr 27 2016 10:51 AM EST    Active Problems   1  Acute pain (338 19) (R52)  2  Admission for long-term (current) use of other medications (V58 69) (Z51 81)  3  Alopecia (704 00) (L65 9)  4  Anemia (285 9) (D64 9)  5  Asthma (493 90) (J45 909)  6  Atrial fibrillation (427 31) (I48 91)  7  Diarrhea (787 91) (R19 7)  8  Edema (782 3) (R60 9)  9  Esophageal reflux (530 81) (K21 9)  10  Fecal occult blood test positive (792 1) (R19 5)  11  Flu vaccine need (V04 81) (Z23)  12  Hyperlipidemia (272 4) (E78 5)  13  Hypertension (401 9) (I10)  14  Influenza vaccine needed (V04 81) (Z23)  15  Need for prophylactic vaccination and inoculation against influenza (V04 81) (Z23)  16  Palpitations (785 1) (R00 2)  17  Paroxysmal atrial fibrillation (427 31) (I48 0)  18  Premature ventricular contraction (427 69) (I49 3)  19  Vitamin D deficiency (268 9) (E55 9)    Current Meds  1   Advair Diskus 250-50 MCG/DOSE Inhalation Aerosol Powder Breath Activated; INHALE   ONE PUFF BY MOUTH TWICE A DAY; Therapy: 61BXF5157 to (Evaluate:18Eay5578)  Requested for: 54OCK8683; Last   BI:44LVL0617 Ordered  2  Bisoprolol-Hydrochlorothiazide 2 5-6 25 MG Oral Tablet; 1/2 to 1 tablet daily prn   sbp>150; Therapy: 19SHS9757 to (Evaluate:12Jun2016)  Requested for: 72BBG6138; Last   Rx:87Qtt2732 Ordered  3  Diclofenac Sodium  MG Oral Tablet Extended Release 24 Hour (Voltaren-XR);   TAKE ONE TABLET BY MOUTH ONCE DAILY; Therapy: 42Lxj2146 to (Radha Chang)  Requested for: 03Czo6973; Last   Rx:12Rvc2280 Ordered  4  Digoxin 125 MCG Oral Tablet; take 1 tablet by mouth one time daily; Therapy: 29BJM1946 to (Duke Mcgee)  Requested for: 73HZB8972; Last   Rx:07Mar2016 Ordered  5  Fluticasone Propionate 50 MCG/ACT Nasal Suspension; USE 1 TO 2 SPRAYS IN EACH   NOSTRIL ONCE DAILY; Therapy: 73YJT4846 to (Evaluate:10Oct2015)  Requested for: 00CBT1406; Last   Rx:52Woe5123 Ordered  6  Omeprazole 40 MG Oral Capsule Delayed Release; 1 tablet daily; Therapy: 81PKY9100 to Recorded  7  Pravastatin Sodium 20 MG Oral Tablet; TAKE 1 TABLET DAILY; Therapy: 71FHY8729 to (Evaluate:46Eyn9081)  Requested for: 99LKZ9942; Last   Rx:05Jan2016 Ordered  8  Voltaren 1 % Transdermal Gel (Diclofenac Sodium); APPLY SPARINGLY TO AFFECTED   AREA(S) ONCE DAILY; Therapy: 80UNR7418 to (Evaluate:17Mar2017)  Requested for: 52OYF0710; Last   Rx:22Mar2016 Ordered  9  Warfarin Sodium 5 MG Oral Tablet; take as directed by MD;   Therapy: 35DZK4909 to (Evaluate:16Mar2016)  Requested for: 45MYI2263; Last   Rx:73Uat7195 Ordered    Allergies   1   No Known Drug Allergies    Signatures   Electronically signed by : Joe Conti, ; Apr 27 2016 10:51AM EST                       (Administrative)

## 2018-01-11 NOTE — PROGRESS NOTES
REPORT NAME: Patient Visit Summary Report   VISIT DATE: 1/20/2017  VISIT TIME: 10:49 AM EST  PATIENT NAME: Martina Reyes  MEDICAL RECORD NUMBER: [id number]  SOCIAL SECURITY NUMBER:   YOB: 1930  AGE: 80  REFERRING PHYSICIAN: Pedro Arreaga MD  SUPERVISING CLINICIAN: Pedro Arreaga MD  HEALTH CARE PROFESSIONAL: Uvaldo Reinoso  PATIENT HOME ADDRESS: [address] [place] [address] PHONE: [phone]  DIAGNOSIS 1: Unspecified atrial fibrillation / I48 91  DIAGNOSIS 2:   DIAGNOSIS 3:   DIAGNOSIS 4:   INR RANGE: 2 - 3  INR GOAL: 2 5  TREATMENT START DATE: 1/26/2012  TREATMENT END DATE:   NEXT VISIT: 2/20/2017  [facility]    VISIT RESULTS   ENCOUNTER NUMBER:   TEST LOCATION: [facility]  [place]  TEST TYPE: Outside Lab (Venipuncture)  VISIT TYPE: Phone Consult  CURRENT INR: 2 52 PROTIME: 26 8  SPECIMEN COL AND RPT DATE: 1/20/2017 10:49  AM EST    VITAL SIGNS  PULSE:  B/P:  WEIGHT:  HEIGHT:  TEMP:     CURRENT ANTICOAGULANT DOSING SCHEDULE  DOSE SIZE: 5mg    ANTICOAGULANT TYPE: WARFARIN  DOSING REGIMEN  Sun       Mon Tues Wed Thurs Fri       Sat  Total/Wk  2 5       5         2 5       5         2 5       2 5       5         25    PATIENT MEDICATION INSTRUCTION: Yes  PATIENT NUTRITIONAL COUNSELING: No  PATIENT BRUISING INSTRUCTION: No      LAST EDUCATION DATE:       PREVIOUS VISIT INFORMATION  VISITDATE   INR Goal  INR   Sun     Mon Tues Wed Thurs   Fri  Sat     Total/wk  1/20/2017   2 5       2 52  2 5     5       2 5     5       2 5     2 5  5       25  12/12/2016  2 5       2 6   2 5     5       2 5     5       2 5     2 5  5       25  11/7/2016   2 5       2 07  2 5     5       2 5     5       2 5     2 5  5       25  10/20/2016  2 5       1 86  2 5     5       2 5     5       2 5     2 5  5       25    ADDITIONAL PREVIOUS VISIT INFORMATION  VISITDATE   PRIMARY RX               DOSE      CrCl  1/20/2017   WARFARIN 5mg                 12/12/2016  WARFARIN                 5mg                 11/7/2016   WARFARIN                 5mg                 10/20/2016  WARFARIN                 5mg                     OTHER CURRENT MEDICATIONS: WARFARIN      PROGRESS NOTES:     PATIENT INSTRUCTIONS: 1/23/17, tc pt, cont current dose, rech 4 weeks,  2/20  pt had uri ,no atbibiotics for uri  she will be planning carpel tunnel surgery in future at [facility] with dr Britta Foster  no date as yet  akosua   ---- Additional Instructions entered on 2/14/2017 1:57 PM EST by Ady Oreilly :  2/14/17, TC FROM PT WAS OFF WARFARIN X 5 DAYS FOR CARPEL  TUNNEL SURGERY, CX DUE TO HIGH HEART RATE  PER PG, RESTART WARAFRIN TO 5 MG  X 3 DAYS, THEN RESUME PREVIOUS DOSXSE, Metsa 49 1 WEEK, 2/22  AKOSUA    TEST LOCATION: [facility]    Electronically signed by:  Ady Oreilly on 2/14/2017 at 1:57 PM EST

## 2018-01-12 VITALS
WEIGHT: 130 LBS | DIASTOLIC BLOOD PRESSURE: 80 MMHG | HEART RATE: 54 BPM | HEIGHT: 63 IN | SYSTOLIC BLOOD PRESSURE: 128 MMHG | BODY MASS INDEX: 23.04 KG/M2

## 2018-01-12 NOTE — RESULT NOTES
Verified Results  (1) PT WITH INR 03Apr2017 10:42AM Jasson Cedeno     Test Name Result Flag Reference   INR 1 84 H 0 86-1 16   PT 21 1 seconds H 12 0-14 3

## 2018-01-12 NOTE — RESULT NOTES
Verified Results  (1) PT WITH INR 11Aug2016 09:34AM Héctor Karimi     Test Name Result Flag Reference   INR 2 06 H 0 86-1 16   PT 23 0 seconds H 12 0-14 3

## 2018-01-12 NOTE — RESULT NOTES
Verified Results  (1) PT WITH INR [id number] 11:08AM Nimco Alu Order Number: [id number]     Test Name Result Flag Reference   INR 1 86 H 0 86-1 16   Performing Comments: Labs biweekly or prn as directed by MD     Lab slip expires 01/31/2018    Fax results to [phone]      Performing Comments: Labs biweekly or prn as directed by MD     Lab slip expires 01/31/2018    Fax results to [phone]   PT 21 3 seconds H 12 0-14 3   INR 1 86 H 0 86-1 16   Performing Comments: Labs biweekly or prn as directed by MD     Lab slip expires 01/31/2018    Fax results to [phone]      Performing Comments: Labs biweekly or prn as directed by MD     Lab slip expires 01/31/2018    Fax results to [phone]   PT 21 3 seconds H 12 0-14 3

## 2018-01-12 NOTE — PROGRESS NOTES
REPORT NAME: Patient Visit Summary Report   VISIT DATE: 6/3/2016  VISIT TIME: 9:42 AM EDT  PATIENT NAME: Mario Benson  MEDICAL RECORD NUMBER: [id number]  SOCIAL SECURITY NUMBER:   YOB: 1930  AGE: 80  REFERRING PHYSICIAN: Iraida Weldon MD  SUPERVISING CLINICIAN: Iraida Weldon MD  HEALTH CARE PROFESSIONAL: Moe Reinoso  PATIENT HOME ADDRESS: [address] [place] [address] Dr PHONE: [phone]  DIAGNOSIS 1: Unspecified atrial fibrillation / I48 91  DIAGNOSIS 2:   DIAGNOSIS 3:   DIAGNOSIS 4:   INR RANGE: 2 - 3  INR GOAL: 2 5  TREATMENT START DATE: 1/26/2012  TREATMENT END DATE:   NEXT VISIT: 6/30/2016  [facility]    VISIT RESULTS   ENCOUNTER NUMBER:   TEST LOCATION: [facility]  TEST TYPE: Outside Lab (Venipuncture)  VISIT TYPE: Phone Consult  CURRENT INR: 2 04 PROTIME: 22 8  SPECIMEN COL AND RPT DATE: 6/3/2016 9:42  AM EDT    VITAL SIGNS  PULSE:  B/P:  WEIGHT:  HEIGHT:  TEMP:     CURRENT ANTICOAGULANT DOSING SCHEDULE  DOSE SIZE: 5mg    ANTICOAGULANT TYPE: WARFARIN  DOSING REGIMEN  Sun       Mon       Tues      Wed       Thurs     Fri       Sat  Total/Wk  2 5       5         2 5       5         2 5       2 5       5         25    PATIENT MEDICATION INSTRUCTION: Yes  PATIENT NUTRITIONAL COUNSELING: No  PATIENT BRUISING INSTRUCTION: No      LAST EDUCATION DATE:       PREVIOUS VISIT INFORMATION  VISITDATE   INR Goal  INR   Sun     Mon     Tues    Wed     Thurs   Fri  Sat     Total/wk  6/3/2016    2 5       2 04  2 5     5       2 5     5       2 5     2 5  5       25  4/19/2016   2 5       2 01  2 5     5       2 5     5       2 5     2 5  5       25  3/22/2016   2 5       2 22  2 5     5       2 5     5       2 5     2 5  5       25  2/19/2016   2 5       2 01  2 5     5       2 5     5       2 5     2 5  5       25    ADDITIONAL PREVIOUS VISIT INFORMATION  VISITDATE   PRIMARY RX               DOSE      CrCl  6/3/2016    WARFARIN 5mg                 4/19/2016   WARFARIN                 5mg                 3/22/2016   WARFARIN                 5mg                 2/19/2016   WARFARIN                 5mg                     OTHER CURRENT MEDICATIONS: WARFARIN      PROGRESS NOTES:     PATIENT INSTRUCTIONS: 6/6/16, tc pt, lm a/m, cont current dose, rech 4  weeks, 6/30  akosua  TEST LOCATION: [facility]    Electronically signed by:  Sander Oreilly on 6/6/2016 at 9:42 AM EDT

## 2018-01-12 NOTE — RESULT NOTES
Verified Results  (1) PT WITH INR [id number] 11:36AM Stephen Lockhart     Test Name Result Flag Reference   INR 2 01 H 0 86-1 16   PT 21 9 seconds H 11 8-14 1

## 2018-01-12 NOTE — RESULT NOTES
Verified Results  (1) PT WITH INR [id number] 10:23AM Stephen Lockhart     Test Name Result Flag Reference   INR 1 57 H 0 86-1 16   PT 18 9 seconds H 12 1-14 4

## 2018-01-12 NOTE — PROGRESS NOTES
REPORT NAME: Patient Visit Summary Report   VISIT DATE: 3/8/2017  VISIT TIME: 10:13 AM EST  PATIENT NAME: Mario Benson  MEDICAL RECORD NUMBER: [id number]  SOCIAL SECURITY NUMBER:   YOB: 1930  AGE: 80  REFERRING PHYSICIAN: Iraida Weldon MD  SUPERVISING CLINICIAN: Iraida Weldon MD  HEALTH CARE PROFESSIONAL: Moe Alston [facility]  PATIENT HOME ADDRESS: [address] [place] [address], [place] [address] PHONE: [phone]  DIAGNOSIS 1: Unspecified atrial fibrillation / I48 91  DIAGNOSIS 2:   DIAGNOSIS 3:   DIAGNOSIS 4:   INR RANGE: 2 - 3  INR GOAL: 2 5  TREATMENT START DATE: 1/26/2012  TREATMENT END DATE:   NEXT VISIT: 3/22/2017  [facility]    VISIT RESULTS   ENCOUNTER NUMBER:   TEST LOCATION: [facility]  TEST TYPE: Outside Lab (Venipuncture)  VISIT TYPE: Phone Consult  CURRENT INR: 1 86 PROTIME: 21 3  SPECIMEN COL AND RPT DATE: 3/8/2017 10:13  AM EST    VITAL SIGNS  PULSE:  B/P:  WEIGHT:  HEIGHT:  TEMP:     CURRENT ANTICOAGULANT DOSING SCHEDULE  DOSE SIZE: 5mg    ANTICOAGULANT TYPE: WARFARIN  DOSING REGIMEN  Sun       Mon       Tues      Wed       Thurs     Fri       Sat  Total/Wk  2 5       5         2 5       5         2 5       2 5       5         25    PATIENT MEDICATION INSTRUCTION: Yes  PATIENT NUTRITIONAL COUNSELING: No  PATIENT BRUISING INSTRUCTION: No      LAST EDUCATION DATE:       PREVIOUS VISIT INFORMATION  VISITDATE   INR Goal  INR   Sun     Mon     Tues    Wed     Thurs   Fri  Sat     Total/wk  3/8/2017    2 5       1 86  2 5     5       2 5     5       2 5     2 5  5       25  1/20/2017   2 5       2 52  2 5     5       2 5     5       2 5     2 5  5       25  12/12/2016  2 5       2 6   2 5     5       2 5     5       2 5     2 5  5       25  11/7/2016   2 5       2 07  2 5     5       2 5     5       2 5     2 5  5       25    ADDITIONAL PREVIOUS VISIT INFORMATION  VISITDATE   PRIMARY RX               DOSE      CrCl  3/8/2017    WARFARIN 5mg                 1/20/2017   WARFARIN                 5mg                 12/12/2016  WARFARIN                 5mg                 11/7/2016   WARFARIN                 5mg                     OTHER CURRENT MEDICATIONS: WARFARIN      PROGRESS NOTES:     PATIENT INSTRUCTIONS: 3/9/17, tc pt, take 5 mg today in place of 2 5 mg,  then resume previous dose, rech 2 weeks, 3/22  akosua  TEST LOCATION: [facility]    Electronically signed by:  Enoch Oreilly on 3/9/2017 at 10:13 AM EST

## 2018-01-12 NOTE — PROGRESS NOTES
REPORT NAME: Patient Visit Summary Report   VISIT DATE: 7/12/2016  VISIT TIME: 3:34 PM EDT  PATIENT NAME: Whitney León  MEDICAL RECORD NUMBER: [id number]  SOCIAL SECURITY NUMBER:   YOB: 1930  AGE: 80  REFERRING PHYSICIAN: Arthur Velasquez MD  SUPERVISING CLINICIAN: Arthur Velasquez MD  HEALTH CARE PROFESSIONAL: Imani Romano  PATIENT HOME ADDRESS: [address] [place] [address], [address] [place] [address] PHONE: [phone]  DIAGNOSIS 1: Unspecified atrial fibrillation / I48 91  DIAGNOSIS 2:   DIAGNOSIS 3:   DIAGNOSIS 4:   INR RANGE: 2 - 3  INR GOAL: 2 5  TREATMENT START DATE: 1/26/2012  TREATMENT END DATE:   NEXT VISIT: 8/9/2016  [facility]    VISIT RESULTS   ENCOUNTER NUMBER:   TEST LOCATION: [facility]  TEST TYPE: Outside Lab (Venipuncture)  VISIT TYPE:   CURRENT INR: 2 08 PROTIME:   SPECIMEN COL AND RPT DATE: 7/12/2016 3:34 PM  EDT    VITAL SIGNS  PULSE:  B/P:  WEIGHT:  HEIGHT:  TEMP:     CURRENT ANTICOAGULANT DOSING SCHEDULE  DOSE SIZE: 5mg    ANTICOAGULANT TYPE: WARFARIN  DOSING REGIMEN  Sun       Mon       Tues      Wed       Thurs     Fri       Sat  Total/Wk  2 5       5         2 5       5         2 5       2 5       5         25    PATIENT MEDICATION INSTRUCTION: No  PATIENT NUTRITIONAL COUNSELING: No  PATIENT BRUISING INSTRUCTION: No      LAST EDUCATION DATE:       PREVIOUS VISIT INFORMATION  VISITDATE   INR Goal  INR   Sun     Mon     Tues    Wed     Thurs   Fri  Sat     Total/wk  7/12/2016   2 5       2 08  2 5     5       2 5     5       2 5     2 5  5       25  6/3/2016    2 5       2 04  2 5     5       2 5     5       2 5     2 5  5       25  4/19/2016   2 5       2 01  2 5     5       2 5     5       2 5     2 5  5       25  3/22/2016   2 5       2 22  2 5     5       2 5     5       2 5     2 5  5       25    ADDITIONAL PREVIOUS VISIT INFORMATION  VISITDATE   PRIMARY RX               DOSE      CrCl  7/12/2016   WARFARIN                 5mg 6/3/2016    WARFARIN                 5mg                 4/19/2016   WARFARIN                 5mg                 3/22/2016   WARFARIN                 5mg                     OTHER CURRENT MEDICATIONS: WARFARIN      PROGRESS NOTES:     PATIENT INSTRUCTIONS: pt called same dose recheck in 4 weeks    TEST LOCATION: [place] [address]    Electronically signed by: Audra Morris on 7/12/2016 at 3:34 PM EDT

## 2018-01-12 NOTE — RESULT NOTES
Verified Results  (1) PT WITH INR [id number] 10:00AM Miri Owens     Test Name Result Flag Reference   INR 1 86 H 0 86-1 16   PT 21 3 seconds H 12 0-14 3

## 2018-01-12 NOTE — RESULT NOTES
Verified Results  (1) AST (SGOT) [id number] 07:09AM [facility] Order Number: [id number]     Test Name Result Flag Reference   AST(SGOT) 20 U/L  5-45   Specimen collection should occur prior to Sulfasalazine and/or Sulfapyridine administration due to the potential for falsely depressed results       (1) LDL,DIRECT [id number] 07:09AM [facility] Order Number: [id number]     Test Name Result Flag Reference   LDL, DIRECT 122 mg/dl H 0-100   LDL Cholesterol:        Optimal          <100 mg/dl        Near Optimal     100-129 mg/dl        Above Optimal          Borderline High   130-159 mg/dl          High              160-189 mg/dl          Very High        >189 mg/dl     (1) DIGOXIN [id number] 07:09AM [facility] Order Number: [id number]     Test Name Result Flag Reference   DIGOXIN 0 6 ng/mL L 0 8-2 0

## 2018-01-13 VITALS
DIASTOLIC BLOOD PRESSURE: 72 MMHG | OXYGEN SATURATION: 97 % | BODY MASS INDEX: 23.1 KG/M2 | HEART RATE: 64 BPM | WEIGHT: 130.38 LBS | HEIGHT: 63 IN | SYSTOLIC BLOOD PRESSURE: 142 MMHG

## 2018-01-13 NOTE — RESULT NOTES
Verified Results  (1) PT WITH INR 03Jun2016 10:18AM [facility]     Test Name Result Flag Reference   INR 2 04 H 0 86-1 16   PT 22 8 seconds H 12 0-14 3

## 2018-01-13 NOTE — PROGRESS NOTES
REPORT NAME: Patient Visit Summary Report   VISIT DATE: 11/7/2016  VISIT TIME: 4:37 PM EST  PATIENT NAME: Juliana Betancourt  MEDICAL RECORD NUMBER: [id number]  SOCIAL SECURITY NUMBER:   YOB: 1930  AGE: 80  REFERRING PHYSICIAN: Ryan Josue MD  SUPERVISING CLINICIAN: Ryan Josue MD  HEALTH CARE PROFESSIONAL: Linus Reinoso  PATIENT HOME ADDRESS: [address] [place] [address], [address] [place] [address] PHONE: [phone]  DIAGNOSIS 1: Unspecified atrial fibrillation / I48 91  DIAGNOSIS 2:   DIAGNOSIS 3:   DIAGNOSIS 4:   INR RANGE: 2 - 3  INR GOAL: 2 5  TREATMENT START DATE: 1/26/2012  TREATMENT END DATE:   NEXT VISIT: 12/5/2016  [facility]    VISIT RESULTS   ENCOUNTER NUMBER:   TEST LOCATION: [facility]  TEST TYPE: Outside Lab (Venipuncture)  VISIT TYPE: Phone Consult  CURRENT INR: 2 07 PROTIME: 23 1  SPECIMEN COL AND RPT DATE: 11/7/2016 4:37  PM EST    VITAL SIGNS  PULSE:  B/P:  WEIGHT:  HEIGHT:  TEMP:     CURRENT ANTICOAGULANT DOSING SCHEDULE  DOSE SIZE: 5mg    ANTICOAGULANT TYPE: WARFARIN  DOSING REGIMEN  Sun       Mon Tues Wed Thurs Fri       Sat  Total/Wk  2 5       5         2 5       5         2 5       2 5       5         25    PATIENT MEDICATION INSTRUCTION: Yes  PATIENT NUTRITIONAL COUNSELING: No  PATIENT BRUISING INSTRUCTION: No      LAST EDUCATION DATE:       PREVIOUS VISIT INFORMATION  VISITDATE   INR Goal  INR   Sun     Mon Tues Wed Thurs Fri  Sat     Total/wk  11/7/2016   2 5       2 07  2 5     5       2 5     5       2 5     2 5  5       25  10/20/2016  2 5       1 86  2 5     5       2 5     5       2 5     2 5  5       25  9/13/2016   2 5       2 96  2 5     5       2 5     5       2 5     2 5  5       25  8/11/2016   2 5       2 06  2 5     5       2 5     5       2 5     2 5  5       25    ADDITIONAL PREVIOUS VISIT INFORMATION  VISITDATE   PRIMARY RX               DOSE      CrCl  11/7/2016   WARFARIN 5mg                 10/20/2016  WARFARIN                 5mg                 9/13/2016   WARFARIN                 5mg                 8/11/2016   WARFARIN                 5mg                     OTHER CURRENT MEDICATIONS: WARFARIN      PROGRESS NOTES:     PATIENT INSTRUCTIONS: 11/7/16, tc pt, cont current dose, rech 4 weeks,  12/5  akosua  TEST LOCATION: [facility]    Electronically signed by:  Zuleika Oreilly on 11/7/2016 at 4:37 PM EST

## 2018-01-13 NOTE — PROGRESS NOTES
REPORT NAME: Patient Visit Summary Report   VISIT DATE: 2/19/2016  VISIT TIME: 4:23 PM EST  PATIENT NAME: Ling Modi  MEDICAL RECORD NUMBER: [id number]  SOCIAL SECURITY NUMBER:   YOB: 1930  AGE: 80  REFERRING PHYSICIAN: Desire Coats MD  SUPERVISING CLINICIAN: Desire Coats MD  HEALTH CARE PROFESSIONAL: Kassy Reinoso  PATIENT HOME ADDRESS: [address] [place] [address] PHONE: [phone]  DIAGNOSIS 1: Unspecified atrial fibrillation / I48 91  DIAGNOSIS 2:   DIAGNOSIS 3:   DIAGNOSIS 4:   INR RANGE: 2 - 3  INR GOAL: 2 5  TREATMENT START DATE: 1/26/2012  TREATMENT END DATE:   NEXT VISIT: 3/17/2016  [facility]    VISIT RESULTS   ENCOUNTER NUMBER:   TEST LOCATION: [facility]  TEST TYPE: Outside Lab (Venipuncture)  VISIT TYPE: Phone Consult  CURRENT INR: 2 01 PROTIME: 21 9  SPECIMEN COL AND RPT DATE: 2/19/2016 4:23  PM EST    VITAL SIGNS  PULSE:  B/P:  WEIGHT:  HEIGHT:  TEMP:     CURRENT ANTICOAGULANT DOSING SCHEDULE  DOSE SIZE: 5mg    ANTICOAGULANT TYPE: WARFARIN  DOSING REGIMEN  Sun       Mon Tues Wed Thurs Fri       Sat  Total/Wk  2 5       5         2 5       5         2 5       2 5       5         25    PATIENT MEDICATION INSTRUCTION: Yes  PATIENT NUTRITIONAL COUNSELING: No  PATIENT BRUISING INSTRUCTION: No      LAST EDUCATION DATE:       PREVIOUS VISIT INFORMATION  VISITDATE   INR Goal  INR   Sun     Mon Tues Wed Thurs   Fri  Sat     Total/wk  2/19/2016   2 5       2 01  2 5     5       2 5     5       2 5     2 5  5       25  2/4/2016    2 5       1 97  2 5     5       2 5     5       2 5     2 5  5       25  1/8/2016    2 5       2 45  2 5     5       2 5     5       2 5     2 5  5       25  12/3/2015   2 5       2 09  2 5     5       2 5     5       2 5     2 5  5       25    ADDITIONAL PREVIOUS VISIT INFORMATION  VISITDATE   PRIMARY RX               DOSE      CrCl  2/19/2016   WARFARIN 5mg                 2/4/2016    WARFARIN                 5mg                 1/8/2016    WARFARIN                 5mg                 12/3/2015   WARFARIN                 5mg                     OTHER CURRENT MEDICATIONS: WARFARIN      PROGRESS NOTES:     PATIENT INSTRUCTIONS: 2/19/16, TC PT, CONT CURRENT DOSE, Metsa 49 4 WEEKS,  3/17  BIANKA  TEST LOCATION: [facility]    Electronically signed by:  Uvaldo Oreilly on 2/19/2016 at 4:23 PM EST

## 2018-01-14 VITALS
DIASTOLIC BLOOD PRESSURE: 78 MMHG | OXYGEN SATURATION: 96 % | BODY MASS INDEX: 22.68 KG/M2 | HEART RATE: 62 BPM | SYSTOLIC BLOOD PRESSURE: 136 MMHG | HEIGHT: 63 IN | WEIGHT: 128 LBS

## 2018-01-15 NOTE — RESULT NOTES
Verified Results  (1) BASIC METABOLIC PROFILE [id number] 09:28AM Elizabeth Paris   TW Order Number: [id number]  TW Order Number: [id number] Order Number: [id number] Order Number: [id number] Order Number: [id number]     Test Name Result Flag Reference   GLUCOSE,RANDM 91 mg/dL     If the patient is fasting, the ADA then defines impaired fasting glucose as > 100 mg/dL and diabetes as > or equal to 123 mg/dL  SODIUM 141 mmol/L  136-145   POTASSIUM 4 2 mmol/L  3 5-5 3   CHLORIDE 109 mmol/L H 100-108   CARBON DIOXIDE 25 mmol/L  21-32   ANION GAP (CALC) 7 mmol/L  4-13   BLOOD UREA NITROGEN 16 mg/dL  5-25   CREATININE 0 85 mg/dL  0 60-1 30   Standardized to IDMS reference method   CALCIUM 8 7 mg/dL  8 3-10 1   eGFR Non-African American      >60 0 ml/min/1 73sq [facility] [employer] Disease Education Program recommendations are as follows:  GFR calculation is accurate only with a steady state creatinine  Chronic Kidney disease less than 60 ml/min/1 73 sq  meters  Kidney failure less than 15 ml/min/1 73 sq  meters       (1) LIPID PANEL FASTING W DIRECT LDL REFLEX [id number] 09:28AM Sammi Croft Order Number: [id number]  TW Order Number: [id number] Order Number: [id number] Order Number: [id number] Order Number: [id number]     Test Name Result Flag Reference   CHOLESTEROL 226 mg/dL H    LDL CHOLESTEROL CALCULATED 133 mg/dL H 0-100   Triglyceride:         Normal              <150 mg/dl       Borderline High    150-199 mg/dl       High               200-499 mg/dl       Very High          >499 mg/dl  Cholesterol:         Desirable        <200 mg/dl      Borderline High  200-239 mg/dl      High             >239 mg/dl  HDL Cholesterol:        High    >59 mg/dL      Low     <41 mg/dL  LDL Cholesterol:        Optimal          <100 mg/dl        Near Optimal     100-129 mg/dl        Above Optimal Borderline High   130-159 mg/dl          High              160-189 mg/dl          Very High        >189 mg/dl  LDL CALCULATED:    This screening LDL is a calculated result  It does not have the accuracy of the Direct Measured LDL in the monitoring of patients with hyperlipidemia and/or statin therapy  Direct Measure LDL (BJL098) must be ordered separately in these patients  TRIGLYCERIDES 92 mg/dL  <=150   Specimen collection should occur prior to N-Acetylcysteine or Metamizole administration due to the potential for falsely depressed results  HDL,DIRECT 75 mg/dL H 40-60   Specimen collection should occur prior to Metamizole administration due to the potential for falsely depressed results       (1) AST (SGOT) [id number] 09:28AM [facility] Order Number: [id number]  TW Order Number: [id number] Order Number: [id number] Order Number: [id number] Order Number: [id number]     Test Name Result Flag Reference   AST(SGOT) 25 U/L  5-45     (1) DIGOXIN [id number] 09:28AM [facility] Order Number: [id number]  TW Order Number: [id number] Order Number: [id number] Order Number: [id number] Order Number: [id number]     Test Name Result Flag Reference   DIGOXIN 0 6 ng/mL L 0 8-2 0

## 2018-01-15 NOTE — RESULT NOTES
Verified Results  (1) PT WITH INR 20Jan2017 08:56AM Eliza Goyal     Test Name Result Flag Reference   INR 2 52 H 0 86-1 16   PT 26 8 seconds H 12 0-14 3

## 2018-01-15 NOTE — PROGRESS NOTES
REPORT NAME: Patient Visit Summary Report   VISIT DATE: 3/22/2016  VISIT TIME: 4:34 PM EDT  PATIENT NAME: Kg Casiano  MEDICAL RECORD NUMBER: [id number]  SOCIAL SECURITY NUMBER:   YOB: 1930  AGE: 80  REFERRING PHYSICIAN: Brynn Ardon MD  SUPERVISING CLINICIAN: Brynn Ardon MD  HEALTH CARE PROFESSIONAL: Bascom Seed [address]  PATIENT HOME ADDRESS: [address] [place] [address], [address] [place] [address] PHONE: [phone]  DIAGNOSIS 1: Unspecified atrial fibrillation / I48 91  DIAGNOSIS 2:   DIAGNOSIS 3:   DIAGNOSIS 4:   INR RANGE: 2 - 3  INR GOAL: 2 5  TREATMENT START DATE: 1/26/2012  TREATMENT END DATE:   NEXT VISIT: 4/19/2016  [facility]    VISIT RESULTS   ENCOUNTER NUMBER:   TEST LOCATION: [facility]  TEST TYPE: Outside Lab (Venipuncture)  VISIT TYPE: Phone Consult  CURRENT INR: 2 22 PROTIME: 23 6  SPECIMEN COL AND RPT DATE: 3/22/2016 4:34  PM EDT    VITAL SIGNS  PULSE:  B/P:  WEIGHT:  HEIGHT:  TEMP:     CURRENT ANTICOAGULANT DOSING SCHEDULE  DOSE SIZE: 5mg    ANTICOAGULANT TYPE: WARFARIN  DOSING REGIMEN  Sun       Mon Tues Wed Thurs Fri       Sat  Total/Wk  2 5       5         2 5       5         2 5       2 5       5         25    PATIENT MEDICATION INSTRUCTION: Yes  PATIENT NUTRITIONAL COUNSELING: No  PATIENT BRUISING INSTRUCTION: No      LAST EDUCATION DATE:       PREVIOUS VISIT INFORMATION  VISITDATE   INR Goal  INR   Sun     Mon Tues Wed Thurs Fri  Sat     Total/wk  3/22/2016   2 5       2 22  2 5     5       2 5     5       2 5     2 5  5       25  2/19/2016   2 5       2 01  2 5     5       2 5     5       2 5     2 5  5       25  2/4/2016    2 5       1 97  2 5     5       2 5     5       2 5     2 5  5       25  1/8/2016    2 5       2 45  2 5     5       2 5     5       2 5     2 5  5       25    ADDITIONAL PREVIOUS VISIT INFORMATION  VISITDATE   PRIMARY RX               DOSE      CrCl  3/22/2016   WARFARIN 5mg                 2/19/2016   WARFARIN                 5mg                 2/4/2016    WARFARIN                 5mg                 1/8/2016    WARFARIN                 5mg                     OTHER CURRENT MEDICATIONS: WARFARIN      PROGRESS NOTES:     PATIENT INSTRUCTIONS: 3/22/16, tc pt, cont current dose, rech 4 weeks,  4/19  akosua  TEST LOCATION: [facility]    Electronically signed by:  Zuleika Oreilly on 3/22/2016 at 4:34 PM EDT

## 2018-01-15 NOTE — RESULT NOTES
Verified Results  (1) PT WITH INR [id number] 12:40PM Latasha Kline     Test Name Result Flag Reference   INR 2 60 H 0 86-1 16   PT 27 4 seconds H 12 0-14 3

## 2018-01-15 NOTE — RESULT NOTES
Verified Results  (1) PT WITH INR [id number] 09:28AM Harshad Ronnie     Test Name Result Flag Reference   INR 2 08 H 0 86-1 16   PT 23 2 seconds H 12 0-14 3

## 2018-01-16 NOTE — RESULT NOTES
Verified Results  (1) PT WITH INR 22Mar2016 10:38AM Elizabeth Paris     Test Name Result Flag Reference   INR 2 22 H 0 86-1 16   PT 23 6 seconds H 11 8-14 1

## 2018-01-17 NOTE — PROGRESS NOTES
REPORT NAME: Patient Visit Summary Report   VISIT DATE: 8/11/2016  VISIT TIME: 2:53 PM EDT  PATIENT NAME: Ramona Verduzco  MEDICAL RECORD NUMBER: [id number]  SOCIAL SECURITY NUMBER:   YOB: 1930  AGE: 80  REFERRING PHYSICIAN: Erica Izquierdo MD  SUPERVISING CLINICIAN: Erica Izquierdo MD  HEALTH CARE PROFESSIONAL: Gregory Reinoso  PATIENT HOME ADDRESS: [address] [place] [address] PHONE: [phone]  DIAGNOSIS 1: Unspecified atrial fibrillation / I48 91  DIAGNOSIS 2:   DIAGNOSIS 3:   DIAGNOSIS 4:   INR RANGE: 2 - 3  INR GOAL: 2 5  TREATMENT START DATE: 1/26/2012  TREATMENT END DATE:   NEXT VISIT: 9/8/2016  [facility]    VISIT RESULTS   ENCOUNTER NUMBER:   TEST LOCATION: [facility]  TEST TYPE: Outside Lab (Venipuncture)  VISIT TYPE: Phone Consult  CURRENT INR: 2 06 PROTIME: 23  SPECIMEN COL AND RPT DATE: 8/11/2016 2:53 PM  EDT    VITAL SIGNS  PULSE:  B/P:  WEIGHT:  HEIGHT:  TEMP:     CURRENT ANTICOAGULANT DOSING SCHEDULE  DOSE SIZE: 5mg    ANTICOAGULANT TYPE: WARFARIN  DOSING REGIMEN  Sun       Mon       Tues      Wed       Thurs     Fri       Sat  Total/Wk  2 5       5         2 5       5         2 5       2 5       5         25    PATIENT MEDICATION INSTRUCTION: Yes  PATIENT NUTRITIONAL COUNSELING: No  PATIENT BRUISING INSTRUCTION: No      LAST EDUCATION DATE:       PREVIOUS VISIT INFORMATION  VISITDATE   INR Goal  INR   Sun     Mon     Tues    Wed     Thurs   Fri  Sat     Total/wk  8/11/2016   2 5       2 06  2 5     5       2 5     5       2 5     2 5  5       25  7/12/2016   2 5       2 08  2 5     5       2 5     5       2 5     2 5  5       25  6/3/2016    2 5       2 04  2 5     5       2 5     5       2 5     2 5  5       25  4/19/2016   2 5       2 01  2 5     5       2 5     5       2 5     2 5  5       25    ADDITIONAL PREVIOUS VISIT INFORMATION  VISITDATE   PRIMARY RX               DOSE      CrCl  8/11/2016   WARFARIN 5mg                 7/12/2016   WARFARIN                 5mg                 6/3/2016    WARFARIN                 5mg                 4/19/2016   WARFARIN                 5mg                     OTHER CURRENT MEDICATIONS: WARFARIN      PROGRESS NOTES:     PATIENT INSTRUCTIONS: 8/11/16, tc pt, cont current dose, rech 4 weeks, 9/8   [place]  TEST LOCATION: [facility]    Electronically signed by:  Olya Oreilly on 8/11/2016 at 2:53 PM EDT

## 2018-01-17 NOTE — RESULT NOTES
Verified Results  (1) PT WITH INR [id number] 11:16AM Flor Poag     Test Name Result Flag Reference   INR 2 01 H 0 86-1 16   PT 21 9 seconds H 11 8-14 1

## 2018-01-17 NOTE — PROGRESS NOTES
REPORT NAME: Patient Visit Summary Report   VISIT DATE: 12/12/2016  VISIT TIME: 10:18 AM EST  PATIENT NAME: Cory Suarez  MEDICAL RECORD NUMBER: [id number]  SOCIAL SECURITY NUMBER:   YOB: 1930  AGE: 80  REFERRING PHYSICIAN: Quinton Kanner, MD  SUPERVISING CLINICIAN: Quinton Kanner, MD  HEALTH CARE PROFESSIONAL: Raymundo Balderas [facility]  PATIENT HOME ADDRESS: [address] [place] [address], [address] [place] [address]  PATIENT HOME PHONE: [phone]  DIAGNOSIS 1: Unspecified atrial fibrillation / I48 91  DIAGNOSIS 2:   DIAGNOSIS 3:   DIAGNOSIS 4:   INR RANGE: 2 - 3  INR GOAL: 2 5  TREATMENT START DATE: 1/26/2012  TREATMENT END DATE:   NEXT VISIT: 1/9/2017  [facility]    VISIT RESULTS   ENCOUNTER NUMBER:   TEST LOCATION: [facility]   [place]  TEST TYPE: Outside Lab (Venipuncture)  VISIT TYPE: Phone Consult  CURRENT INR: 2 6 PROTIME: 27 4  SPECIMEN COL AND RPT DATE: 12/12/2016 10:18  AM EST    VITAL SIGNS  PULSE:  B/P:  WEIGHT:  HEIGHT:  TEMP:     CURRENT ANTICOAGULANT DOSING SCHEDULE  DOSE SIZE: 5mg    ANTICOAGULANT TYPE: WARFARIN  DOSING REGIMEN  Sun       Mon       Tues      Wed       Thurs     Fri       Sat  Total/Wk  2 5       5         2 5       5         2 5       2 5       5         25    PATIENT MEDICATION INSTRUCTION: Yes  PATIENT NUTRITIONAL COUNSELING: No  PATIENT BRUISING INSTRUCTION: Yes      LAST EDUCATION DATE:       PREVIOUS VISIT INFORMATION  VISITDATE   INR Goal  INR   Sun     Mon Tues Wed Thurs   Fri  Sat     Total/wk  12/12/2016  2 5       2 6   2 5     5       2 5     5       2 5     2 5  5       25  11/7/2016   2 5       2 07  2 5     5       2 5     5       2 5     2 5  5       25  10/20/2016  2 5       1 86  2 5     5       2 5     5       2 5     2 5  5       25  9/13/2016   2 5       2 96  2 5     5       2 5     5       2 5     2 5  5       25    ADDITIONAL PREVIOUS VISIT INFORMATION  VISITDATE   PRIMARY RX               DOSE      CrCl  12/12/2016  WARFARIN 5mg                 11/7/2016   WARFARIN                 5mg                 10/20/2016  WARFARIN                 5mg                 9/13/2016   WARFARIN                 5mg                     OTHER CURRENT MEDICATIONS: WARFARIN      PROGRESS NOTES:     PATIENT INSTRUCTIONS: 12/13/16, tc pt, lm am, cont current dose, rech 4  weeks, 1/9  [place]  TEST LOCATION: [facility]    Electronically signed by:  Lisa Oreilly on 12/13/2016 at 10:18 AM EST

## 2018-01-18 NOTE — RESULT NOTES
Verified Results  (1) PT WITH INR [id number] 11:21AM Oscar Craven     Test Name Result Flag Reference   INR 2 07 H 0 86-1 16   PT 23 1 seconds H 12 0-14 3

## 2018-01-18 NOTE — RESULT NOTES
Verified Results  (1) CBC/ PLT (NO DIFF) [id number] 10:29AM Leena Shine     Test Name Result Flag Reference   WBC COUNT 10 30 Thousand/uL H 4 31-10 16   RBC COUNT 4 90 Million/uL  3 81-5 12   HEMOGLOBIN 14 0 g/dL  11 5-15 4   HEMATOCRIT 44 0 %  34 8-46  1   MCV 90 fL  82-98   MCH 28 6 pg  26 8-34 3   MCHC 31 8 g/dL  31 4-37 4   RDW 15 6 % H 11 6-15 1   MPV 11 3 fL  8 9-12 7   PLATELET COUNT 708 Thousand/uL  149-390     (1) DIGOXIN [id number] 10:29AM Leena Shine     Test Name Result Flag Reference   DIGOXIN 0 6 ng/mL L 0 7-2 1

## 2018-01-23 VITALS
BODY MASS INDEX: 22.59 KG/M2 | SYSTOLIC BLOOD PRESSURE: 140 MMHG | HEART RATE: 55 BPM | WEIGHT: 127.5 LBS | DIASTOLIC BLOOD PRESSURE: 80 MMHG

## 2018-01-25 ENCOUNTER — TELEPHONE (OUTPATIENT)
Dept: CARDIOLOGY CLINIC | Facility: CLINIC | Age: 83
End: 2018-01-25

## 2018-01-25 NOTE — TELEPHONE ENCOUNTER
Phone call from patient, [phone]  She is requesting dr Rodriguez Niles call her son in law,dr raya fraser, ob/gyn, cell, [phone]  She reports he is concerned that she has not been attending some family functions   She states her b/p has been erratic  As high as 025 systolic  She then takes bisoprolol 5 mg 1/2 tablet ordered prn for b/p 150 or higher  After taking medication ,she feels tired and b/p can drop to 346 systolic  She reports her pulse rates in 50 range  Please call son in law at your convenience

## 2018-03-13 DIAGNOSIS — I10 HYPERTENSION, UNSPECIFIED TYPE: Primary | ICD-10-CM

## 2018-03-13 DIAGNOSIS — J45.909 UNCOMPLICATED ASTHMA, UNSPECIFIED ASTHMA SEVERITY, UNSPECIFIED WHETHER PERSISTENT: ICD-10-CM

## 2018-03-14 RX ORDER — BISOPROLOL FUMARATE 5 MG/1
TABLET ORAL
Qty: 90 TABLET | Refills: 0 | Status: SHIPPED | OUTPATIENT
Start: 2018-03-14 | End: 2019-01-17 | Stop reason: SDUPTHER

## 2018-03-23 ENCOUNTER — TELEPHONE (OUTPATIENT)
Dept: CARDIOLOGY CLINIC | Facility: CLINIC | Age: 83
End: 2018-03-23

## 2018-03-23 DIAGNOSIS — I10 ESSENTIAL HYPERTENSION: Primary | ICD-10-CM

## 2018-03-23 RX ORDER — LISINOPRIL 2.5 MG/1
2.5 TABLET ORAL EVERY EVENING
Qty: 30 TABLET | Refills: 5 | Status: SHIPPED | OUTPATIENT
Start: 2018-03-23 | End: 2018-12-19

## 2018-03-23 NOTE — PROGRESS NOTES
Called pt    BP still high after taking bisoprolol daily    Can use lisinopril in PM prn sbp>150-rx sent in    Dont take today since she took HCTZ on her own

## 2018-03-23 NOTE — TELEPHONE ENCOUNTER
Pt called this afternoon with complaints of high Bp  States that SBP has been greater than 150  Today BP was in 170's  She has had to take her Bisoprolol 5mg 1/2 tablet almost daily this week  She takes this in the morning PRN if BP is too high, but today stated that after she already had taken Bisoprolol in AM her BP was high again in the afternoon  She has no other complaints and feels well besides her BP being too high  Please advise   Thanks

## 2018-03-27 ENCOUNTER — OFFICE VISIT (OUTPATIENT)
Dept: INTERNAL MEDICINE CLINIC | Facility: CLINIC | Age: 83
End: 2018-03-27
Payer: MEDICARE

## 2018-03-27 VITALS — BODY MASS INDEX: 22.89 KG/M2 | HEIGHT: 63 IN | OXYGEN SATURATION: 95 % | HEART RATE: 112 BPM | WEIGHT: 129.2 LBS

## 2018-03-27 DIAGNOSIS — I10 HYPERTENSION, UNSPECIFIED TYPE: ICD-10-CM

## 2018-03-27 DIAGNOSIS — I48.91 ATRIAL FIBRILLATION, UNSPECIFIED TYPE (HCC): Primary | ICD-10-CM

## 2018-03-27 DIAGNOSIS — E55.9 VITAMIN D DEFICIENCY: ICD-10-CM

## 2018-03-27 PROCEDURE — G0439 PPPS, SUBSEQ VISIT: HCPCS | Performed by: INTERNAL MEDICINE

## 2018-03-27 PROCEDURE — 99213 OFFICE O/P EST LOW 20 MIN: CPT | Performed by: INTERNAL MEDICINE

## 2018-03-27 NOTE — PROGRESS NOTES
Assessment/Plan:    No problem-specific Assessment & Plan notes found for this encounter  Diagnoses and all orders for this visit:    Atrial fibrillation, unspecified type ([address] )  -     CBC and differential; Future    Hypertension, unspecified type  -     Comprehensive metabolic panel; Future  -     TSH, 3rd generation; Future  -     Lipid panel; Future  -     Cancel: UA w Reflex to Microscopic w Reflex to Culture - Clinic Collect  -     UA w Reflex to Microscopic w Reflex to Culture -Lab Collect    Vitamin D deficiency  -     Vitamin D 25 hydroxy; Future        Subjective:      Patient ID: Daniel Strauss is a 80 y o  female  HPI prudence is here today accompanied by her daughter Denis Sewell  She has had large fluctuations in her blood pressure  Her blood pressure and sometimes will run in the 120 range but sometimes will spike up to approximately 190  This did occur several times last week  She called Dr Le Ace her cardiologist who gave her prescription for lisinopril 2 5 which she took approximately 36 hours ago and resulted in a drop of her blood pressure to 99 systolic she felt a little peculiar and possibly had some loose in a shins  There does not seem to be any reason for her blood pressures to be spiking like this but this has been a pattern that she has had for many years that has been vaccine a toward her physicians  She otherwise feels well she is alert bright and intelligent  The following portions of the patient's history were reviewed and updated as appropriate: current medications, past family history, past medical history, past social history, past surgical history and problem list     Review of Systems      Objective:  Rashi Hills is a delightful lady she is again as noted above it at alert intelligent and vivacious    Her blood pressure is 118/60 taken by me her pulse is approximately 90 and irregularly regular the lungs are clear cardiac examination is unremarkable except for the irregular rate and is no significant edema  We discussed her response to the lisinopril  She is also use bisoprolol in the past for spikes in blood pressure which apparently was not is dramatically affected his lisinopril but was better tolerated  After much discussion 1 to suggest that she continue her current regimen abandon the lisinopril and use 2 5 a bisoprolol as needed for spikes in her blood pressure  She had data he has atrial fibrillation and the vaccine systolic hypertension in the elderly  Complete set of lab studies will be ordered will see her back in 4 months    Pulse (!) 112   Ht 5' 3" (1 6 m)   Wt 58 6 kg (129 lb 3 2 oz)   SpO2 95%   BMI 22 89 kg/m²          Physical Exam      AWV Clinical     ISAR:   Previous hospitalizations?:  No       Once in a Lifetime Medicare Screening:   EKG performed?:  No    AAA screening performed? (if performed, please add date to Health Maintenance):  No       Medicare Screening Tests and Risk Assessment:   AAA Risk Assessment    None Indicated:  Yes    Osteoporosis Risk Assessment     Female:  Yes   :  Yes :  No   Age over 48:   Yes     Alcohol use:  No   HIV Risk Assessment    None indicated:  Yes        Drug and Alcohol Use:   Tobacco use    Cigarettes:  never smoker    Tobacco use duration    Tobacco Cessation Readiness    Alcohol use    Alcohol use:  never    Alcohol Treatment Readiness   Illicit Drug Use    Drug use:  never        Diet & Exercise:   Diet   What is your diet?:  Regular   How many servings a day of the following:   Fruits and Vegetables:  3-4 Meat:  1-2   Whole Grains:  1     Soda:  1   Coffee:  1    Exercise    Do you currently exercise?:  yes    Frequency:  occasional    Type of exercise:  walking       Cognitive Impairment Screening:   Depression screening preformed:  No    Cognitive Impairment Screening    Do you have difficulty learning or retaining new information?:  No Do you have difficulty handling new tasks?:  No   Do you have difficulty with reasoning?:  No Do you have difficulty with spatial ability and orientation?:  No   Do you have difficulty with language?:  No Do you have difficulty with behavior?:  No       Functional Ability/Level of Safety:   Hearing    Hearing difficulties:  Yes Bilateral:  slightly decreased   Left:  slightly decreased Right:  slightly decreased   Hearing aid:  Yes    Hearing Impairment Assessment    Current Activities    Status:  no driving   Help needed with the folllowing:    Using the phone:  No Transportation:  Yes   Shopping:  No Preparing Meals:  No   Doing Housework:  No Doing Laundry:  No   Managing Medications:  No Managing Money:  No   ADL    Feeding:  Independant   Oral hygiene and Facial grooming:  Independant   Bathing:  Independant   Upper Body Dressing:  Independant   Lower Body Dressing:  Independant   Toileting:  Independant   Bed Mobility:  Independant   Fall Risk   Have you fallen in the last 12 months?:  Yes Are you unsteady on your feet?:  No    Are you taking any medications that may cause fatigue or dizziness?:  Yes   Do you have any chronic conditions that may contribute to a fall?:  Visual Disturbances    Injury History    Antihypertensive Use:  Yes   Previous Fall:  Yes Alcohol Use:  No    Urinary Incontinence:  No       Home Safety:   Home Safety Risk Factors   Unfamilar with surroundings:  No Uneven floors:  No   Stairs or handrail saftey risk:  Yes Loose rugs:  No   Household clutter:  No Poor household lighting:  No   No grab bars in bathroom:  Yes Further evaluation needed:  No       Advanced Directives:   Advanced Directives    Living Will:  Yes Durable POA for healthcare:   Yes   Advanced directive:  Yes    Patient's End of Life Decisions    Reviewed with patient:  No        Urinary Incontinence:   Do you have urinary incontinence?:  No        Glaucoma:

## 2018-06-02 DIAGNOSIS — I48.0 PAF (PAROXYSMAL ATRIAL FIBRILLATION) (HCC): Primary | ICD-10-CM

## 2018-06-03 RX ORDER — APIXABAN 2.5 MG/1
TABLET, FILM COATED ORAL
Qty: 180 TABLET | Refills: 3 | Status: SHIPPED | OUTPATIENT
Start: 2018-06-03 | End: 2019-05-28 | Stop reason: SDUPTHER

## 2018-06-08 ENCOUNTER — TELEPHONE (OUTPATIENT)
Dept: INTERNAL MEDICINE CLINIC | Facility: CLINIC | Age: 83
End: 2018-06-08

## 2018-06-08 NOTE — TELEPHONE ENCOUNTER
Prudence has been having issues with her gait  She said her lower extremities are not good  She has problems walking and is getting very concerned  She will be seeing you at the end of July but I felt that you should know this sooner  What is your recommendation for her at this time?

## 2018-06-11 NOTE — TELEPHONE ENCOUNTER
Sent referral to [facility] for PT evaluation  Patient is aware, she prefers at home PT do to not being able to drive

## 2018-06-11 NOTE — TELEPHONE ENCOUNTER
Not sure if Prudence will need a script for this  Please let me know if she does not and I will call her back    Thanks

## 2018-07-19 ENCOUNTER — TRANSCRIBE ORDERS (OUTPATIENT)
Dept: LAB | Facility: HOSPITAL | Age: 83
End: 2018-07-19

## 2018-07-19 DIAGNOSIS — I48.91 ATRIAL FIBRILLATION, UNSPECIFIED TYPE (HCC): Primary | ICD-10-CM

## 2018-07-20 ENCOUNTER — APPOINTMENT (OUTPATIENT)
Dept: LAB | Facility: HOSPITAL | Age: 83
End: 2018-07-20
Payer: MEDICARE

## 2018-07-20 ENCOUNTER — TELEPHONE (OUTPATIENT)
Dept: INTERNAL MEDICINE CLINIC | Facility: CLINIC | Age: 83
End: 2018-07-20

## 2018-07-20 DIAGNOSIS — I48.91 ATRIAL FIBRILLATION, UNSPECIFIED TYPE (HCC): ICD-10-CM

## 2018-07-20 DIAGNOSIS — E55.9 VITAMIN D DEFICIENCY: ICD-10-CM

## 2018-07-20 DIAGNOSIS — I10 HYPERTENSION, UNSPECIFIED TYPE: ICD-10-CM

## 2018-07-20 LAB
25(OH)D3 SERPL-MCNC: 14.3 NG/ML (ref 30–100)
ALBUMIN SERPL BCP-MCNC: 3.8 G/DL (ref 3.5–5)
ALP SERPL-CCNC: 138 U/L (ref 46–116)
ALT SERPL W P-5'-P-CCNC: 28 U/L (ref 12–78)
ANION GAP SERPL CALCULATED.3IONS-SCNC: 6 MMOL/L (ref 4–13)
AST SERPL W P-5'-P-CCNC: 21 U/L (ref 5–45)
BASOPHILS # BLD AUTO: 0.08 THOUSANDS/ΜL (ref 0–0.1)
BASOPHILS NFR BLD AUTO: 1 % (ref 0–1)
BILIRUB SERPL-MCNC: 0.71 MG/DL (ref 0.2–1)
BUN SERPL-MCNC: 17 MG/DL (ref 5–25)
CALCIUM SERPL-MCNC: 9.1 MG/DL (ref 8.3–10.1)
CHLORIDE SERPL-SCNC: 106 MMOL/L (ref 100–108)
CHOLEST SERPL-MCNC: 175 MG/DL (ref 50–200)
CO2 SERPL-SCNC: 27 MMOL/L (ref 21–32)
CREAT SERPL-MCNC: 0.93 MG/DL (ref 0.6–1.3)
EOSINOPHIL # BLD AUTO: 0.27 THOUSAND/ΜL (ref 0–0.61)
EOSINOPHIL NFR BLD AUTO: 4 % (ref 0–6)
ERYTHROCYTE [DISTWIDTH] IN BLOOD BY AUTOMATED COUNT: 14.6 % (ref 11.6–15.1)
GFR SERPL CREATININE-BSD FRML MDRD: 55 ML/MIN/1.73SQ M
GLUCOSE P FAST SERPL-MCNC: 72 MG/DL (ref 65–99)
HCT VFR BLD AUTO: 42.1 % (ref 34.8–46.1)
HDLC SERPL-MCNC: 59 MG/DL (ref 40–60)
HGB BLD-MCNC: 12.9 G/DL (ref 11.5–15.4)
IMM GRANULOCYTES # BLD AUTO: 0.03 THOUSAND/UL (ref 0–0.2)
IMM GRANULOCYTES NFR BLD AUTO: 0 % (ref 0–2)
LDLC SERPL CALC-MCNC: 99 MG/DL (ref 0–100)
LYMPHOCYTES # BLD AUTO: 1.28 THOUSANDS/ΜL (ref 0.6–4.47)
LYMPHOCYTES NFR BLD AUTO: 17 % (ref 14–44)
MCH RBC QN AUTO: 28.9 PG (ref 26.8–34.3)
MCHC RBC AUTO-ENTMCNC: 30.6 G/DL (ref 31.4–37.4)
MCV RBC AUTO: 94 FL (ref 82–98)
MONOCYTES # BLD AUTO: 0.57 THOUSAND/ΜL (ref 0.17–1.22)
MONOCYTES NFR BLD AUTO: 7 % (ref 4–12)
NEUTROPHILS # BLD AUTO: 5.51 THOUSANDS/ΜL (ref 1.85–7.62)
NEUTS SEG NFR BLD AUTO: 71 % (ref 43–75)
NONHDLC SERPL-MCNC: 116 MG/DL
NRBC BLD AUTO-RTO: 0 /100 WBCS
PLATELET # BLD AUTO: 249 THOUSANDS/UL (ref 149–390)
PMV BLD AUTO: 11.4 FL (ref 8.9–12.7)
POTASSIUM SERPL-SCNC: 4.2 MMOL/L (ref 3.5–5.3)
PROT SERPL-MCNC: 7.3 G/DL (ref 6.4–8.2)
RBC # BLD AUTO: 4.47 MILLION/UL (ref 3.81–5.12)
SODIUM SERPL-SCNC: 139 MMOL/L (ref 136–145)
TRIGL SERPL-MCNC: 83 MG/DL
TSH SERPL DL<=0.05 MIU/L-ACNC: 2.17 UIU/ML (ref 0.36–3.74)
VENIPUNCTURE: NORMAL
WBC # BLD AUTO: 7.74 THOUSAND/UL (ref 4.31–10.16)

## 2018-07-20 PROCEDURE — 80053 COMPREHEN METABOLIC PANEL: CPT

## 2018-07-20 PROCEDURE — 84443 ASSAY THYROID STIM HORMONE: CPT

## 2018-07-20 PROCEDURE — 85025 COMPLETE CBC W/AUTO DIFF WBC: CPT

## 2018-07-20 PROCEDURE — 36415 COLL VENOUS BLD VENIPUNCTURE: CPT

## 2018-07-20 PROCEDURE — 80061 LIPID PANEL: CPT

## 2018-07-20 PROCEDURE — 82306 VITAMIN D 25 HYDROXY: CPT

## 2018-07-30 ENCOUNTER — OFFICE VISIT (OUTPATIENT)
Dept: INTERNAL MEDICINE CLINIC | Facility: CLINIC | Age: 83
End: 2018-07-30
Payer: MEDICARE

## 2018-07-30 VITALS
TEMPERATURE: 97.3 F | OXYGEN SATURATION: 97 % | HEART RATE: 88 BPM | WEIGHT: 128 LBS | SYSTOLIC BLOOD PRESSURE: 158 MMHG | DIASTOLIC BLOOD PRESSURE: 76 MMHG | HEIGHT: 63 IN | BODY MASS INDEX: 22.68 KG/M2

## 2018-07-30 DIAGNOSIS — I10 HYPERTENSION, UNSPECIFIED TYPE: Primary | ICD-10-CM

## 2018-07-30 PROCEDURE — 99213 OFFICE O/P EST LOW 20 MIN: CPT | Performed by: INTERNAL MEDICINE

## 2018-07-30 NOTE — PROGRESS NOTES
Assessment/Plan:    No problem-specific Assessment & Plan notes found for this encounter  Problem List Items Addressed This Visit     Hypertension - Primary            Subjective:      Patient ID: Bartolo Knapp is a 80 y o  female  HPI prudence is here today accompanied by her daughter Reyes Hurtado  She seems to be doing well she has been getting physical therapy at home for balance problems  She continues under the care of Dr Kaylah Gutiérrez her cardiologist and is under the care of an ophthalmologist for macular degeneration she is now 80years old quite intelligent and spry  She has done some reading and questions whether she might have Parkinson's disease as the cause of her gait problems  She has had no chest pain shortness of breath or palpitations her medications have not needed adjustment quite a long time she has some mild chronic constipation    The following portions of the patient's history were reviewed and updated as appropriate: allergies, current medications, past family history, past medical history, past social history, past surgical history and problem list     Review of Systems      Objective:      /76 (BP Location: Left arm, Patient Position: Sitting, Cuff Size: Standard)   Pulse 88   Temp (!) 97 3 °F (36 3 °C) (Tympanic)   Ht 5' 3" (1 6 m)   Wt 58 1 kg (128 lb)   SpO2 97%   BMI 22 67 kg/m²          Physical Exam  Mrs Le Fuller  is vibrant intelligent awake and alert and very interesting lady to speak with  She remains in atrial fibrillation with a controlled rate her blood pressure was 158/76 the heart lungs and extremities are unremarkable  An her 1st cranial nerve is quite intact and she was able to smell coffee grounds placed under her nose  Her tone is quite normal there is no rigidity bradykinesia is not noted her facial expression is normal and her gait and station is certainly not parkinsonian    We went over her lab studies which were satisfactory we gave her an explanation and a information sheet about the new shingles vaccine    No changes are made in her medications I have reassured the patient that her balance problems and most likely multifactorial and age-related I do not see any evidence of Parkinson's disease at this time will see her back in 4 months for general surveillance

## 2018-09-26 DIAGNOSIS — K21.9 GASTROESOPHAGEAL REFLUX DISEASE, ESOPHAGITIS PRESENCE NOT SPECIFIED: Primary | ICD-10-CM

## 2018-09-26 RX ORDER — OMEPRAZOLE 40 MG/1
CAPSULE, DELAYED RELEASE ORAL
Qty: 90 CAPSULE | Refills: 3 | Status: SHIPPED | OUTPATIENT
Start: 2018-09-26 | End: 2019-09-30 | Stop reason: SDUPTHER

## 2018-11-08 ENCOUNTER — IMMUNIZATION (OUTPATIENT)
Dept: INTERNAL MEDICINE CLINIC | Facility: CLINIC | Age: 83
End: 2018-11-08
Payer: MEDICARE

## 2018-11-08 VITALS — TEMPERATURE: 98.1 F

## 2018-11-08 DIAGNOSIS — Z23 NEEDS FLU SHOT: Primary | ICD-10-CM

## 2018-11-08 PROCEDURE — 90662 IIV NO PRSV INCREASED AG IM: CPT

## 2018-11-08 PROCEDURE — G0008 ADMIN INFLUENZA VIRUS VAC: HCPCS

## 2018-12-11 DIAGNOSIS — E78.5 HYPERLIPIDEMIA, UNSPECIFIED HYPERLIPIDEMIA TYPE: Primary | ICD-10-CM

## 2018-12-11 RX ORDER — PRAVASTATIN SODIUM 20 MG
TABLET ORAL
Qty: 90 TABLET | Refills: 3 | Status: SHIPPED | OUTPATIENT
Start: 2018-12-11 | End: 2019-12-03 | Stop reason: SDUPTHER

## 2018-12-19 ENCOUNTER — OFFICE VISIT (OUTPATIENT)
Dept: CARDIOLOGY CLINIC | Facility: CLINIC | Age: 83
End: 2018-12-19
Payer: MEDICARE

## 2018-12-19 VITALS
HEIGHT: 63 IN | DIASTOLIC BLOOD PRESSURE: 72 MMHG | BODY MASS INDEX: 22.93 KG/M2 | WEIGHT: 129.4 LBS | HEART RATE: 81 BPM | SYSTOLIC BLOOD PRESSURE: 139 MMHG

## 2018-12-19 DIAGNOSIS — I48.0 PAROXYSMAL ATRIAL FIBRILLATION (HCC): Primary | ICD-10-CM

## 2018-12-19 DIAGNOSIS — R53.83 OTHER FATIGUE: ICD-10-CM

## 2018-12-19 DIAGNOSIS — E78.2 MIXED HYPERLIPIDEMIA: ICD-10-CM

## 2018-12-19 DIAGNOSIS — I10 ESSENTIAL HYPERTENSION: ICD-10-CM

## 2018-12-19 PROCEDURE — 99214 OFFICE O/P EST MOD 30 MIN: CPT | Performed by: INTERNAL MEDICINE

## 2018-12-19 PROCEDURE — 93000 ELECTROCARDIOGRAM COMPLETE: CPT | Performed by: INTERNAL MEDICINE

## 2018-12-19 NOTE — PROGRESS NOTES
Cardiology Follow Up    Hubert Chakraborty  7/9/1930  [id number]   [address] [facility] [id number] [phone]      Reason for visit: overdue for 6 month FU for PAF  Akshat Goff Also has HTN, HLP and palpitations    1  Paroxysmal atrial fibrillation (HCC)  POCT ECG   2  Essential hypertension     3  Mixed hyperlipidemia         Interval History:  The patient has not noted much in the way of palpitations  She has noted some HRs a bit over 100 at times  She takes bisoprolol 2 5 mg prn for BP  Akshat Goff She did take it this PM  She denies chest pain or SOB    She denies edema  She does get some dizziness at times  Patient Active Problem List   Diagnosis    Anemia    Hyperlipidemia    Hypertension    Paroxysmal atrial fibrillation (HCC)    Premature ventricular contraction    Vitamin D deficiency    Esophageal reflux     Past Medical History:   Diagnosis Date    At[facility] [address] [address] )     Fracture of ankle     Hyperlipidemia     Hypertension     Macular degeneration     Osteoarthritis     Vitamin D deficiency      Social History     Social History    Marital status:      Spouse name: N/A    Number of children: N/A    Years of education: N/A     Occupational History    Not on file       Social History Main Topics    Smoking status: Never Smoker    Smokeless tobacco: Never Used    Alcohol use No      Comment: Social drinker per [employer]    Drug use: No    Sexual activity: Not on file     Other Topics Concern    Not on file     Social History Narrative    No narrative on file      Family History   Problem Relation Age of Onset    Supraventricular tachycardia Family      Past Surgical History:   Procedure Laterality Date    APPENDECTOMY      PERINEAL  PELVIC RECONSTRUCTION SURGERY         Current Outpatient Prescriptions:     ADVAIR DISKUS 250-50 MCG/DOSE inhaler, USE 1 INHALATION TWO TIMES A DAY, Disp: 60 each, Rfl: 4    bisoprolol (ZEBETA) 5 mg tablet, TAKE ONE-HALF (1/2) TABLET DAILY AS NEEDED FOR PALPITATIONS, Disp: 90 tablet, Rfl: 0    digoxin (LANOXIN) 0 125 mg tablet, Take by mouth, Disp: , Rfl:     ELIQUIS 2 5 MG, TAKE 1 TABLET TWICE A DAY (REPLACES WARFARIN), Disp: 180 tablet, Rfl: 3    omeprazole (PriLOSEC) 40 MG capsule, TAKE 1 CAPSULE DAILY, Disp: 90 capsule, Rfl: 3    pravastatin (PRAVACHOL) 20 mg tablet, TAKE 1 TABLET DAILY, Disp: 90 tablet, Rfl: 3    lisinopril (ZESTRIL) 2 5 mg tablet, Take 1 tablet (2 5 mg total) by mouth every evening Take only if SBP> 150 mm Hg (Patient not taking: Reported on 12/19/2018 ), Disp: 30 tablet, Rfl: 5  No Known Allergies      Review of Systems:  Review of Systems   Constitutional: Positive for fatigue  Negative for appetite change, diaphoresis and unexpected weight change  Respiratory: Negative for cough, chest tightness, shortness of breath and wheezing  Cardiovascular: Negative for chest pain, palpitations and leg swelling  Gastrointestinal: Positive for constipation  Negative for abdominal pain, blood in stool and diarrhea  Genitourinary: Negative for dysuria, frequency, hematuria and urgency  Musculoskeletal: Positive for arthralgias and back pain  Negative for gait problem and joint swelling  Neurological: Positive for dizziness and light-headedness  Negative for speech difficulty, numbness and headaches  Psychiatric/Behavioral: Negative for agitation, behavioral problems, confusion and decreased concentration  Physical Exam:  Vitals:    12/19/18 1328   BP: 139/72   BP Location: Right arm   Patient Position: Sitting   Cuff Size: Standard   Pulse: 81   Weight: 58 7 kg (129 lb 6 4 oz)   Height: 5' 3" (1 6 m)       Physical Exam   Constitutional: She is oriented to person, place, and time  She appears well-developed and well-nourished  No distress  HENT:   Head: Normocephalic and atraumatic  Mouth/Throat: No oropharyngeal exudate  Eyes: Conjunctivae are normal  No scleral icterus  Neck: Neck supple  Normal carotid pulses and no JVD present  Carotid bruit is not present  No thyromegaly present  Cardiovascular: Normal rate  An irregularly irregular rhythm present  Exam reveals no gallop and no friction rub  No murmur heard  Pulses:       Dorsalis pedis pulses are 2+ on the right side, and 2+ on the left side  Posterior tibial pulses are 2+ on the right side, and 2+ on the left side  Pulmonary/Chest: Effort normal and breath sounds normal  She has no wheezes  She has no rhonchi  She has no rales  Abdominal: Soft  She exhibits no mass  There is no hepatosplenomegaly  There is no tenderness  Musculoskeletal: She exhibits edema (1+ bilat in LEs) and deformity (kyphosis)  She exhibits no tenderness  Neurological: She is alert and oriented to person, place, and time  She has normal strength  No cranial nerve deficit or sensory deficit  Skin: Skin is warm and dry  No rash noted  No erythema  No pallor  Psychiatric: She has a normal mood and affect  Her behavior is normal  Judgment and thought content normal        Discussion/Summary:  1  PAF- now in AFIB  Jefferson Mckoy Rate ok but took bisoprolol  Audrey Fairy it well    Continue eliquis 2 5 mg BID  Jefferson Mckoy On digoxin  Jefferson Mckoy Check level in January 2  HTN-well controlled with prn use of bisoprolol    Continue same  3  Mixed HLP-LDL 99 on pravastatin    Would favor pacer and AV perlita ablation if HR trends up in AFIB since we are limited by BP issues and low HR in NSR in treating this medically          FU 6 months  Check CBC with next month with digoxin level    Marck Elmore MD

## 2019-01-04 ENCOUNTER — TRANSCRIBE ORDERS (OUTPATIENT)
Dept: LAB | Facility: HOSPITAL | Age: 84
End: 2019-01-04

## 2019-01-04 DIAGNOSIS — I48.0 PAROXYSMAL ATRIAL FIBRILLATION (HCC): Primary | ICD-10-CM

## 2019-01-10 ENCOUNTER — APPOINTMENT (OUTPATIENT)
Dept: LAB | Facility: HOSPITAL | Age: 84
End: 2019-01-10
Attending: INTERNAL MEDICINE
Payer: MEDICARE

## 2019-01-10 DIAGNOSIS — I48.0 PAROXYSMAL ATRIAL FIBRILLATION (HCC): ICD-10-CM

## 2019-01-10 DIAGNOSIS — R53.83 OTHER FATIGUE: ICD-10-CM

## 2019-01-10 LAB
DIGOXIN SERPL-MCNC: 0.6 NG/ML (ref 0.8–2)
ERYTHROCYTE [DISTWIDTH] IN BLOOD BY AUTOMATED COUNT: 15.1 % (ref 11.6–15.1)
HCT VFR BLD AUTO: 43.8 % (ref 34.8–46.1)
HGB BLD-MCNC: 13.4 G/DL (ref 11.5–15.4)
MCH RBC QN AUTO: 28.6 PG (ref 26.8–34.3)
MCHC RBC AUTO-ENTMCNC: 30.6 G/DL (ref 31.4–37.4)
MCV RBC AUTO: 94 FL (ref 82–98)
PLATELET # BLD AUTO: 261 THOUSANDS/UL (ref 149–390)
PMV BLD AUTO: 11.4 FL (ref 8.9–12.7)
RBC # BLD AUTO: 4.68 MILLION/UL (ref 3.81–5.12)
VENIPUNCTURE: NORMAL
WBC # BLD AUTO: 7 THOUSAND/UL (ref 4.31–10.16)

## 2019-01-10 PROCEDURE — 36415 COLL VENOUS BLD VENIPUNCTURE: CPT

## 2019-01-10 PROCEDURE — 80162 ASSAY OF DIGOXIN TOTAL: CPT

## 2019-01-10 PROCEDURE — 85027 COMPLETE CBC AUTOMATED: CPT

## 2019-01-16 ENCOUNTER — OFFICE VISIT (OUTPATIENT)
Dept: INTERNAL MEDICINE CLINIC | Facility: CLINIC | Age: 84
End: 2019-01-16
Payer: MEDICARE

## 2019-01-16 VITALS
OXYGEN SATURATION: 98 % | HEART RATE: 79 BPM | HEIGHT: 63 IN | WEIGHT: 129 LBS | BODY MASS INDEX: 22.86 KG/M2 | TEMPERATURE: 98.8 F

## 2019-01-16 DIAGNOSIS — Z23 NEED FOR VACCINATION AGAINST STREPTOCOCCUS PNEUMONIAE USING PNEUMOCOCCAL CONJUGATE VACCINE 13: Primary | ICD-10-CM

## 2019-01-16 PROCEDURE — 90670 PCV13 VACCINE IM: CPT

## 2019-01-16 PROCEDURE — G0009 ADMIN PNEUMOCOCCAL VACCINE: HCPCS

## 2019-01-16 PROCEDURE — 99214 OFFICE O/P EST MOD 30 MIN: CPT | Performed by: INTERNAL MEDICINE

## 2019-01-16 NOTE — PROGRESS NOTES
Assessment/Plan:    No problem-specific Assessment & Plan notes found for this encounter  Problem List Items Addressed This Visit     None            Subjective:      Patient ID: Tacos Butcher is a 80 y o  female  HPI  Prud she continues under the care ence is here today for visit accompanied by her daughter Manjeet Rea  Her vision has deteriorated continues under the care of Dr Iraida whitehead her ophthalmologist who is treating her for macular degeneration especially in the right eye with some bleeding  Please excuse the poor syntax of the dictation as dictation system is malfunctioning  She continues under the care of her cardiologist Dr Jaime brown in permanent atrial fibrillation now she takes bisoprolol 5 mg p r n  For elevated blood pressure and/or heart rate  She lives alone and is getting some considering getting some help at home she finished a course of physical therapy for gait and balance    She has bilateral carpal tunnels at there was an attempt made previously to do this to a surgical correction however her heart rate was accelerated at that time in the procedure was aborted she is now being awakened with burning and numbness in her hands she denies chest pain and shortness of breath but occasionally does notice some wheezing seems like she does have a history of allergies    The following portions of the patient's history were reviewed and updated as appropriate: allergies, current medications, past family history, past medical history, past social history, past surgical history and problem list     Review of Systems  denies chest pain unusual shortness of breath appetite bowels digestion a been okay    Objective:      Pulse 79   Temp 98 8 °F (37 1 °C) (Tympanic)   Ht 5' 3" (1 6 m)   Wt 58 5 kg (129 lb)   SpO2 98%   BMI 22 85 kg/m²          Physical Exam  she is alert sharp very intelligent has a quick quit her blood pressure was approximately 130/70 her pulse is in the mid 80s and is clearly irregularly irregular the carotids are quiet the lungs are clear I hear no wheezing at this time extremities show barely a trace of edema neurologically she is alert fully oriented quite intelligent and sharp and has no focal or lateralizing signs gait station are normal patient is quite stable we did discuss the carpal tunnel the atrial fib hypertension wheezing at length also the shingles vaccine which I think she is going to decline at this point we will give her Prevnar however continue her current medications I have asked her to get in touch with 1 of the hand surgeons that she has seen and see whether she could not at least get injections into the carpal tunnel has a follow-up appointment with her other physicians which I have encouraged her to keep

## 2019-01-17 DIAGNOSIS — I10 HYPERTENSION, UNSPECIFIED TYPE: ICD-10-CM

## 2019-01-17 RX ORDER — BISOPROLOL FUMARATE 5 MG/1
TABLET ORAL
Qty: 90 TABLET | Refills: 0 | Status: SHIPPED | OUTPATIENT
Start: 2019-01-17 | End: 2019-10-16 | Stop reason: SDUPTHER

## 2019-03-09 DIAGNOSIS — I48.91 ATRIAL FIBRILLATION, UNSPECIFIED TYPE (HCC): Primary | ICD-10-CM

## 2019-03-09 RX ORDER — DIGOXIN 125 UG/1
TABLET ORAL
Qty: 90 TABLET | Refills: 4 | Status: SHIPPED | OUTPATIENT
Start: 2019-03-09 | End: 2020-04-02

## 2019-05-28 ENCOUNTER — TRANSCRIBE ORDERS (OUTPATIENT)
Dept: LAB | Facility: HOSPITAL | Age: 84
End: 2019-05-28

## 2019-05-28 ENCOUNTER — OFFICE VISIT (OUTPATIENT)
Dept: INTERNAL MEDICINE CLINIC | Facility: CLINIC | Age: 84
End: 2019-05-28
Payer: MEDICARE

## 2019-05-28 DIAGNOSIS — I48.91 ATRIAL FIBRILLATION, UNSPECIFIED TYPE (HCC): Primary | ICD-10-CM

## 2019-05-28 DIAGNOSIS — J45.909 UNCOMPLICATED ASTHMA, UNSPECIFIED ASTHMA SEVERITY, UNSPECIFIED WHETHER PERSISTENT: ICD-10-CM

## 2019-05-28 DIAGNOSIS — E55.9 VITAMIN D DEFICIENCY: ICD-10-CM

## 2019-05-28 DIAGNOSIS — I10 HYPERTENSION, UNSPECIFIED TYPE: ICD-10-CM

## 2019-05-28 DIAGNOSIS — R26.89 BALANCE PROBLEM: ICD-10-CM

## 2019-05-28 DIAGNOSIS — D64.9 ANEMIA, UNSPECIFIED TYPE: ICD-10-CM

## 2019-05-28 DIAGNOSIS — E78.5 HYPERLIPIDEMIA, UNSPECIFIED HYPERLIPIDEMIA TYPE: ICD-10-CM

## 2019-05-28 DIAGNOSIS — I48.0 PAF (PAROXYSMAL ATRIAL FIBRILLATION) (HCC): ICD-10-CM

## 2019-05-28 DIAGNOSIS — M62.84 SARCOPENIA: ICD-10-CM

## 2019-05-28 PROCEDURE — G0439 PPPS, SUBSEQ VISIT: HCPCS | Performed by: INTERNAL MEDICINE

## 2019-05-28 PROCEDURE — 99214 OFFICE O/P EST MOD 30 MIN: CPT | Performed by: INTERNAL MEDICINE

## 2019-05-28 RX ORDER — MV-MIN/FA/VIT K/LUTEIN/ZEAXANT 200MCG-5MG
1 CAPSULE ORAL 2 TIMES DAILY
COMMUNITY

## 2019-05-29 ENCOUNTER — TELEPHONE (OUTPATIENT)
Dept: INTERNAL MEDICINE CLINIC | Facility: CLINIC | Age: 84
End: 2019-05-29

## 2019-05-29 VITALS
HEART RATE: 73 BPM | RESPIRATION RATE: 16 BRPM | OXYGEN SATURATION: 97 % | HEIGHT: 61 IN | WEIGHT: 129.5 LBS | DIASTOLIC BLOOD PRESSURE: 80 MMHG | SYSTOLIC BLOOD PRESSURE: 158 MMHG | TEMPERATURE: 98.2 F | BODY MASS INDEX: 24.45 KG/M2

## 2019-05-29 DIAGNOSIS — M62.81 MUSCLE WEAKNESS: Primary | ICD-10-CM

## 2019-05-29 RX ORDER — APIXABAN 2.5 MG/1
TABLET, FILM COATED ORAL
Qty: 180 TABLET | Refills: 3 | Status: SHIPPED | OUTPATIENT
Start: 2019-05-29 | End: 2020-05-19 | Stop reason: SDUPTHER

## 2019-05-30 ENCOUNTER — TRANSCRIBE ORDERS (OUTPATIENT)
Dept: LAB | Facility: HOSPITAL | Age: 84
End: 2019-05-30

## 2019-05-30 DIAGNOSIS — D64.9 ANEMIA, UNSPECIFIED TYPE: Primary | ICD-10-CM

## 2019-05-31 ENCOUNTER — APPOINTMENT (OUTPATIENT)
Dept: LAB | Facility: HOSPITAL | Age: 84
End: 2019-05-31
Payer: MEDICARE

## 2019-05-31 DIAGNOSIS — E55.9 VITAMIN D DEFICIENCY: ICD-10-CM

## 2019-05-31 DIAGNOSIS — E78.5 HYPERLIPIDEMIA, UNSPECIFIED HYPERLIPIDEMIA TYPE: ICD-10-CM

## 2019-05-31 DIAGNOSIS — D64.9 ANEMIA, UNSPECIFIED TYPE: ICD-10-CM

## 2019-05-31 DIAGNOSIS — I48.91 ATRIAL FIBRILLATION, UNSPECIFIED TYPE (HCC): ICD-10-CM

## 2019-05-31 DIAGNOSIS — I10 HYPERTENSION, UNSPECIFIED TYPE: ICD-10-CM

## 2019-05-31 LAB
ALBUMIN SERPL BCP-MCNC: 4.1 G/DL (ref 3.5–5)
ALP SERPL-CCNC: 155 U/L (ref 46–116)
ALT SERPL W P-5'-P-CCNC: 27 U/L (ref 12–78)
ANION GAP SERPL CALCULATED.3IONS-SCNC: 8 MMOL/L (ref 4–13)
AST SERPL W P-5'-P-CCNC: 23 U/L (ref 5–45)
BASOPHILS # BLD AUTO: 0.08 THOUSANDS/ΜL (ref 0–0.1)
BASOPHILS NFR BLD AUTO: 1 % (ref 0–1)
BILIRUB SERPL-MCNC: 0.62 MG/DL (ref 0.2–1)
BUN SERPL-MCNC: 20 MG/DL (ref 5–25)
CALCIUM SERPL-MCNC: 8.7 MG/DL (ref 8.3–10.1)
CHLORIDE SERPL-SCNC: 105 MMOL/L (ref 100–108)
CO2 SERPL-SCNC: 26 MMOL/L (ref 21–32)
CREAT SERPL-MCNC: 0.92 MG/DL (ref 0.6–1.3)
EOSINOPHIL # BLD AUTO: 0.21 THOUSAND/ΜL (ref 0–0.61)
EOSINOPHIL NFR BLD AUTO: 3 % (ref 0–6)
ERYTHROCYTE [DISTWIDTH] IN BLOOD BY AUTOMATED COUNT: 14.8 % (ref 11.6–15.1)
FOLATE SERPL-MCNC: >20 NG/ML (ref 3.1–17.5)
GFR SERPL CREATININE-BSD FRML MDRD: 56 ML/MIN/1.73SQ M
GLUCOSE SERPL-MCNC: 55 MG/DL (ref 65–140)
HCT VFR BLD AUTO: 46.6 % (ref 34.8–46.1)
HGB BLD-MCNC: 14.2 G/DL (ref 11.5–15.4)
IMM GRANULOCYTES # BLD AUTO: 0.03 THOUSAND/UL (ref 0–0.2)
IMM GRANULOCYTES NFR BLD AUTO: 0 % (ref 0–2)
LYMPHOCYTES # BLD AUTO: 1.78 THOUSANDS/ΜL (ref 0.6–4.47)
LYMPHOCYTES NFR BLD AUTO: 21 % (ref 14–44)
MCH RBC QN AUTO: 28.6 PG (ref 26.8–34.3)
MCHC RBC AUTO-ENTMCNC: 30.5 G/DL (ref 31.4–37.4)
MCV RBC AUTO: 94 FL (ref 82–98)
MONOCYTES # BLD AUTO: 0.83 THOUSAND/ΜL (ref 0.17–1.22)
MONOCYTES NFR BLD AUTO: 10 % (ref 4–12)
NEUTROPHILS # BLD AUTO: 5.53 THOUSANDS/ΜL (ref 1.85–7.62)
NEUTS SEG NFR BLD AUTO: 65 % (ref 43–75)
NRBC BLD AUTO-RTO: 0 /100 WBCS
NT-PROBNP SERPL-MCNC: 1685 PG/ML
PLATELET # BLD AUTO: 252 THOUSANDS/UL (ref 149–390)
PMV BLD AUTO: 11.1 FL (ref 8.9–12.7)
POTASSIUM SERPL-SCNC: 3.7 MMOL/L (ref 3.5–5.3)
PROT SERPL-MCNC: 7.8 G/DL (ref 6.4–8.2)
RBC # BLD AUTO: 4.96 MILLION/UL (ref 3.81–5.12)
SODIUM SERPL-SCNC: 139 MMOL/L (ref 136–145)
TSH SERPL DL<=0.05 MIU/L-ACNC: 3.98 UIU/ML (ref 0.36–3.74)
VENIPUNCTURE: NORMAL
VIT B12 SERPL-MCNC: 278 PG/ML (ref 100–900)
WBC # BLD AUTO: 8.46 THOUSAND/UL (ref 4.31–10.16)

## 2019-05-31 PROCEDURE — 82607 VITAMIN B-12: CPT

## 2019-05-31 PROCEDURE — 36415 COLL VENOUS BLD VENIPUNCTURE: CPT

## 2019-05-31 PROCEDURE — 85025 COMPLETE CBC W/AUTO DIFF WBC: CPT

## 2019-05-31 PROCEDURE — 84443 ASSAY THYROID STIM HORMONE: CPT

## 2019-05-31 PROCEDURE — 80053 COMPREHEN METABOLIC PANEL: CPT

## 2019-05-31 PROCEDURE — 82746 ASSAY OF FOLIC ACID SERUM: CPT

## 2019-05-31 PROCEDURE — 83880 ASSAY OF NATRIURETIC PEPTIDE: CPT

## 2019-07-02 ENCOUNTER — OFFICE VISIT (OUTPATIENT)
Dept: CARDIOLOGY CLINIC | Facility: CLINIC | Age: 84
End: 2019-07-02
Payer: MEDICARE

## 2019-07-02 VITALS
DIASTOLIC BLOOD PRESSURE: 90 MMHG | WEIGHT: 129.8 LBS | SYSTOLIC BLOOD PRESSURE: 150 MMHG | BODY MASS INDEX: 24.51 KG/M2 | HEART RATE: 75 BPM | HEIGHT: 61 IN

## 2019-07-02 DIAGNOSIS — I10 ESSENTIAL HYPERTENSION: ICD-10-CM

## 2019-07-02 DIAGNOSIS — E78.2 MIXED HYPERLIPIDEMIA: ICD-10-CM

## 2019-07-02 DIAGNOSIS — I48.19 PERSISTENT ATRIAL FIBRILLATION (HCC): Primary | ICD-10-CM

## 2019-07-02 PROCEDURE — 99214 OFFICE O/P EST MOD 30 MIN: CPT | Performed by: INTERNAL MEDICINE

## 2019-07-02 NOTE — PROGRESS NOTES
Cardiology Follow Up    [facility]  7/9/1930  [address] [facility] [address]  [phone]    Reason for visit: 6 month FU for PAF  Aracely Gu Also has HTH, HLP and hx of palpitations  1  Paroxysmal [facility] ([facility] )     2  Essential hypertension     3  Mixed hyperlipidemia         Interval History: The patient gets occ palpitations but for the most part these are not that bad  She takes bisoprolol only occasionally for SBP>150  Aracely Gu Her BP is above 069 systolic and she feels better than she did  She denies CP or SOB  She denies much edema  She denies much dizziness  Patient Active Problem List   Diagnosis    Anemia    Hyperlipidemia    Hypertension    Paroxysmal atrial fibrillation (HCC)    Premature ventricular contraction    Vitamin D deficiency    Esophageal reflux     Past Medical History:   Diagnosis Date    [facility] fibrillation ([facility] )     Fracture of ankle     Hyperlipidemia     Hypertension     Macular degeneration     Osteoarthritis     Vitamin D deficiency      Social History     Socioeconomic History    Marital status:       Spouse name: Not on file    Number of children: Not on file    Years of education: Not on file    Highest education level: Not on file   Occupational History    Not on file   Social Needs    Financial resource strain: Not on file    Food insecurity:     Worry: Not on file     Inability: Not on file    Transportation needs:     Medical: Not on file     Non-medical: Not on file   Tobacco Use    Smoking status: Never Smoker    Smokeless tobacco: Never Used   Substance and Sexual Activity    Alcohol use: No     Comment: Social drinker per [employer]    Drug use: No    Sexual activity: Not on file   Lifestyle    Physical activity:     Days per week: Not on file     Minutes per session: Not on file    Stress: Not on file   Relationships  Social connections:     Talks on phone: Not on file     Gets together: Not on file     Attends Yazdanism service: Not on file     Active member of club or organization: Not on file     Attends meetings of clubs or organizations: Not on file     Relationship status: Not on file    Intimate partner violence:     Fear of current or ex partner: Not on file     Emotionally abused: Not on file     Physically abused: Not on file     Forced sexual activity: Not on file   Other Topics Concern    Not on file   Social History Narrative    Not on file      Family History   Problem Relation Age of Onset    Supraventricular tachycardia Family      Past Surgical History:   Procedure Laterality Date    APPENDECTOMY      PERINEAL  PELVIC RECONSTRUCTION SURGERY         Current Outpatient Medications:     bisoprolol (ZEBETA) 5 mg tablet, TAKE ONE-HALF (1/2) TABLET DAILY AS NEEDED FOR PALPITATIONS, Disp: 90 tablet, Rfl: 0    DIGOX 125 MCG tablet, TAKE 1 TABLET DAILY, Disp: 90 tablet, Rfl: 4    ELIQUIS 2 5 MG, TAKE 1 TABLET TWICE A DAY (REPLACES WARFARIN), Disp: 180 tablet, Rfl: 3    fluticasone-salmeterol (ADVAIR DISKUS, WIXELA INHUB) 250-50 mcg/dose inhaler, USE 1 INHALATION TWO TIMES A DAY, Disp: 60 each, Rfl: 4    Multiple Vitamins-Minerals (PRESERVISION AREDS 2+MULTI VIT) CAPS, Take 1 capsule by mouth 2 (two) times a day , Disp: , Rfl:     omeprazole (PriLOSEC) 40 MG capsule, TAKE 1 CAPSULE DAILY, Disp: 90 capsule, Rfl: 3    pravastatin (PRAVACHOL) 20 mg tablet, TAKE 1 TABLET DAILY, Disp: 90 tablet, Rfl: 3  No Known Allergies        Review of Systems:  Review of Systems   Constitutional: Positive for fatigue  Negative for activity change, appetite change and unexpected weight change  Respiratory: Positive for wheezing  Negative for cough, chest tightness and shortness of breath  Cardiovascular: Positive for palpitations  Negative for chest pain and leg swelling  Gastrointestinal: Positive for constipation  Negative for abdominal pain, blood in stool and diarrhea  Genitourinary: Negative for dysuria, frequency, hematuria and urgency  Musculoskeletal: Positive for arthralgias, back pain and gait problem  Negative for myalgias  Neurological: Positive for numbness  Negative for dizziness, speech difficulty and light-headedness  Psychiatric/Behavioral: Negative for agitation, behavioral problems, confusion and decreased concentration  Physical Exam:  Vitals:    07/02/19 1053   BP: 150/90   BP Location: Right arm   Patient Position: Sitting   Cuff Size: Large   Pulse: 75   Weight: 58 9 kg (129 lb 12 8 oz)   Height: 5' 1" (1 549 m)       Physical Exam   Constitutional: She is oriented to person, place, and time  She appears well-developed and well-nourished  No distress  HENT:   Head: Normocephalic and atraumatic  Mouth/Throat: Oropharynx is clear and moist  No oropharyngeal exudate  Eyes: Conjunctivae are normal  No scleral icterus  Neck: Neck supple  Normal carotid pulses and no JVD present  Carotid bruit is not present  No thyromegaly present  Cardiovascular: Normal rate and normal pulses  An irregularly irregular rhythm present  Exam reveals no gallop and no friction rub  No murmur heard  Pulmonary/Chest: Effort normal and breath sounds normal  She has no wheezes  She has no rhonchi  She has no rales  Abdominal: Soft  She exhibits no mass  There is no hepatosplenomegaly  There is no tenderness  Musculoskeletal: She exhibits edema (1+ in LEs) and deformity (kyphosis)  She exhibits no tenderness  Neurological: She is alert and oriented to person, place, and time  She has normal strength  No cranial nerve deficit or sensory deficit  Skin: Skin is warm and dry  No rash noted  No erythema  No pallor  Psychiatric: She has a normal mood and affect  Her behavior is normal  Judgment and thought content normal        Discussion/Summary:  1  Persistent atrial fibrillation    Rate well controlled on digoxin  Not having much in the way of palpitations and suspect this is since atrial fibrillation is now persistent  On Eliquis for stroke prophylaxis  2  Hypertension  Blood pressure 140/82  Only has to take bisoprolol 2 5 mg on an as-needed basis  Lower blood pressures have been in excess of 120 mmHg and she feels better than she had  3  Mixed hyperlipidemia  Patient on pravastatin  Historically favorable lipids on this dosage        FU 6 months    Leoncio Ramires MD

## 2019-09-23 ENCOUNTER — TELEPHONE (OUTPATIENT)
Dept: CARDIOLOGY CLINIC | Facility: CLINIC | Age: 84
End: 2019-09-23

## 2019-09-23 NOTE — TELEPHONE ENCOUNTER
Phone call to patient    BP better    Down to 157/89 with HR of 87    Told ok to take extra 1/2 of bisoprolol tessie    Will keep us posted

## 2019-09-23 NOTE — TELEPHONE ENCOUNTER
Pt is very concerned BP last night HS was above 150 so she took her bisoprolol as per her instructions  This morning BP still elevated so took Bisoprolol around 7:30 AM  And now Bp still elevated at 175/96   the patient would like to be advised on what to do next  Quan Onofre

## 2019-09-30 DIAGNOSIS — K21.9 GASTROESOPHAGEAL REFLUX DISEASE, ESOPHAGITIS PRESENCE NOT SPECIFIED: ICD-10-CM

## 2019-09-30 RX ORDER — OMEPRAZOLE 40 MG/1
CAPSULE, DELAYED RELEASE ORAL
Qty: 90 CAPSULE | Refills: 4 | Status: SHIPPED | OUTPATIENT
Start: 2019-09-30

## 2019-10-02 ENCOUNTER — OFFICE VISIT (OUTPATIENT)
Dept: INTERNAL MEDICINE CLINIC | Facility: CLINIC | Age: 84
End: 2019-10-02
Payer: MEDICARE

## 2019-10-02 VITALS
HEIGHT: 61 IN | TEMPERATURE: 98.3 F | OXYGEN SATURATION: 96 % | BODY MASS INDEX: 23.79 KG/M2 | WEIGHT: 126 LBS | HEART RATE: 80 BPM

## 2019-10-02 DIAGNOSIS — I10 ESSENTIAL HYPERTENSION: ICD-10-CM

## 2019-10-02 DIAGNOSIS — R10.31 RIGHT LOWER QUADRANT PAIN: ICD-10-CM

## 2019-10-02 DIAGNOSIS — Z23 FLU VACCINE NEED: Primary | ICD-10-CM

## 2019-10-02 DIAGNOSIS — Z90.49 HISTORY OF APPENDECTOMY: ICD-10-CM

## 2019-10-02 DIAGNOSIS — D64.9 ANEMIA, UNSPECIFIED TYPE: ICD-10-CM

## 2019-10-02 PROCEDURE — 90662 IIV NO PRSV INCREASED AG IM: CPT | Performed by: INTERNAL MEDICINE

## 2019-10-02 PROCEDURE — 99214 OFFICE O/P EST MOD 30 MIN: CPT | Performed by: INTERNAL MEDICINE

## 2019-10-02 PROCEDURE — G0008 ADMIN INFLUENZA VIRUS VAC: HCPCS | Performed by: INTERNAL MEDICINE

## 2019-10-02 NOTE — PROGRESS NOTES
Assessment/Plan:    No problem-specific Assessment & Plan notes found for this encounter  Diagnoses and all orders for this visit:    Flu vaccine need  -     influenza vaccine, [id number], high-dose, PF 0 5 mL (FLUZONE HIGH-DOSE)    Essential hypertension  -     Comprehensive metabolic panel; Future  -     CBC and differential; Future  -     TSH, 3rd generation; Future    Anemia, unspecified type  -     Vitamin B12; Future  -     Ferritin; Future    Right lower quadrant pain    History of appendectomy  -     US abdomen complete; Future          Subjective:      Patient ID: Johnathan Blackwell is a 80 y o  female  [facility]   80years old and has a atrial fibrillation she is anticoagulated here today for visit accompanied by her daughter Jakub  She is anticoagulated and takes medication also for her blood pressure  Over the last several months she feels that she has gone a little bit down hill she is more tired not as energetic as she used to be and feels that she has deteriorated she has occasional right lower quadrant abdominal discomfort  There is a history of remote appendectomy when she was a child also some pelvic reconstruction and a number of years ago a laparoscopic repair of a abdominal hernia  She has symptoms consistent with spinal stenosis and is seen physical therapy in the past she has not seen an orthopedist or a physiatry us for a long time  She is a former patient of Dr Juan Antonio Salas  When she had her trouble with her back she was also seen by Shelia coleman the rheumatologist and had imaging done of her spine    The following portions of the patient's history were reviewed and updated as appropriate: allergies, current medications, past family history, past medical history, past social history, past surgical history and problem list     Review of Systems      Objective:      Pulse 80   Temp 98 3 °F (36 8 °C) (Tympanic)   Ht 5' 1" (1 549 m)   Wt 57 2 kg (126 lb)   SpO2 96% BMI 23 81 kg/m²          Physical Exam  she is alert bright and spry her pressure is 155/80 she is in atrial fibrillation with a rate in the upper 80s the lungs are clear the cardiac examination is otherwise unremarkable the abdomen is soft and no palpable organ enlargement or masses there is a very tiny scar over the umbilicus from previous laparoscopy there is a hernia scar I do not palpate any abnormalities in the abdomen extremities unremarkable she does have a nickel sized red excoriated lesion on the upper right arm  In view of her age and multiple morbidities and so forth we are going to set up some laboratory studies    We will do an ultrasound of the abdomen as well will refer back to physical therapy for evaluation and maintenance of strength gait and so forth and also refer to Dermatology for her multiple skin lesions

## 2019-10-07 ENCOUNTER — HOSPITAL ENCOUNTER (OUTPATIENT)
Dept: ULTRASOUND IMAGING | Facility: MEDICAL CENTER | Age: 84
Discharge: HOME | End: 2019-10-07
Payer: MEDICARE

## 2019-10-07 DIAGNOSIS — Z90.49 HISTORY OF APPENDECTOMY: ICD-10-CM

## 2019-10-07 PROCEDURE — 76705 ECHO EXAM OF ABDOMEN: CPT

## 2019-10-11 ENCOUNTER — TELEPHONE (OUTPATIENT)
Dept: INTERNAL MEDICINE CLINIC | Facility: CLINIC | Age: 84
End: 2019-10-11

## 2019-10-16 DIAGNOSIS — I10 HYPERTENSION, UNSPECIFIED TYPE: ICD-10-CM

## 2019-10-16 RX ORDER — BISOPROLOL FUMARATE 5 MG/1
TABLET ORAL
Qty: 90 TABLET | Refills: 4 | Status: SHIPPED | OUTPATIENT
Start: 2019-10-16 | End: 2020-12-07

## 2019-10-17 ENCOUNTER — TRANSCRIBE ORDERS (OUTPATIENT)
Dept: LAB | Facility: HOSPITAL | Age: 84
End: 2019-10-17

## 2019-10-17 DIAGNOSIS — D64.9 ANEMIA, UNSPECIFIED TYPE: Primary | ICD-10-CM

## 2019-10-24 ENCOUNTER — APPOINTMENT (OUTPATIENT)
Dept: LAB | Facility: HOSPITAL | Age: 84
End: 2019-10-24
Payer: MEDICARE

## 2019-10-24 DIAGNOSIS — D64.9 ANEMIA, UNSPECIFIED TYPE: ICD-10-CM

## 2019-10-24 DIAGNOSIS — I10 ESSENTIAL HYPERTENSION: ICD-10-CM

## 2019-10-24 LAB
ALBUMIN SERPL BCP-MCNC: 4.4 G/DL (ref 3.5–5)
ALP SERPL-CCNC: 154 U/L (ref 46–116)
ALT SERPL W P-5'-P-CCNC: 35 U/L (ref 12–78)
ANION GAP SERPL CALCULATED.3IONS-SCNC: 6 MMOL/L (ref 4–13)
AST SERPL W P-5'-P-CCNC: 24 U/L (ref 5–45)
BASOPHILS # BLD AUTO: 0.08 THOUSANDS/ΜL (ref 0–0.1)
BASOPHILS NFR BLD AUTO: 1 % (ref 0–1)
BILIRUB SERPL-MCNC: 0.73 MG/DL (ref 0.2–1)
BUN SERPL-MCNC: 23 MG/DL (ref 5–25)
CALCIUM SERPL-MCNC: 8.9 MG/DL (ref 8.3–10.1)
CHLORIDE SERPL-SCNC: 110 MMOL/L (ref 100–108)
CO2 SERPL-SCNC: 27 MMOL/L (ref 21–32)
CREAT SERPL-MCNC: 0.86 MG/DL (ref 0.6–1.3)
EOSINOPHIL # BLD AUTO: 0.21 THOUSAND/ΜL (ref 0–0.61)
EOSINOPHIL NFR BLD AUTO: 3 % (ref 0–6)
ERYTHROCYTE [DISTWIDTH] IN BLOOD BY AUTOMATED COUNT: 14.6 % (ref 11.6–15.1)
FERRITIN SERPL-MCNC: 34 NG/ML (ref 8–388)
GFR SERPL CREATININE-BSD FRML MDRD: 60 ML/MIN/1.73SQ M
GLUCOSE SERPL-MCNC: 67 MG/DL (ref 65–140)
HCT VFR BLD AUTO: 43.3 % (ref 34.8–46.1)
HGB BLD-MCNC: 13.2 G/DL (ref 11.5–15.4)
IMM GRANULOCYTES # BLD AUTO: 0.02 THOUSAND/UL (ref 0–0.2)
IMM GRANULOCYTES NFR BLD AUTO: 0 % (ref 0–2)
LYMPHOCYTES # BLD AUTO: 1.34 THOUSANDS/ΜL (ref 0.6–4.47)
LYMPHOCYTES NFR BLD AUTO: 18 % (ref 14–44)
MCH RBC QN AUTO: 28.9 PG (ref 26.8–34.3)
MCHC RBC AUTO-ENTMCNC: 30.5 G/DL (ref 31.4–37.4)
MCV RBC AUTO: 95 FL (ref 82–98)
MONOCYTES # BLD AUTO: 0.67 THOUSAND/ΜL (ref 0.17–1.22)
MONOCYTES NFR BLD AUTO: 9 % (ref 4–12)
NEUTROPHILS # BLD AUTO: 4.98 THOUSANDS/ΜL (ref 1.85–7.62)
NEUTS SEG NFR BLD AUTO: 69 % (ref 43–75)
NRBC BLD AUTO-RTO: 0 /100 WBCS
PLATELET # BLD AUTO: 230 THOUSANDS/UL (ref 149–390)
PMV BLD AUTO: 11.3 FL (ref 8.9–12.7)
POTASSIUM SERPL-SCNC: 4.2 MMOL/L (ref 3.5–5.3)
PROT SERPL-MCNC: 7.3 G/DL (ref 6.4–8.2)
RBC # BLD AUTO: 4.57 MILLION/UL (ref 3.81–5.12)
SODIUM SERPL-SCNC: 143 MMOL/L (ref 136–145)
TSH SERPL DL<=0.05 MIU/L-ACNC: 3.84 UIU/ML (ref 0.36–3.74)
VENIPUNCTURE: NORMAL
VIT B12 SERPL-MCNC: 247 PG/ML (ref 100–900)
WBC # BLD AUTO: 7.3 THOUSAND/UL (ref 4.31–10.16)

## 2019-10-24 PROCEDURE — 36415 COLL VENOUS BLD VENIPUNCTURE: CPT

## 2019-10-24 PROCEDURE — 85025 COMPLETE CBC W/AUTO DIFF WBC: CPT

## 2019-10-24 PROCEDURE — 80053 COMPREHEN METABOLIC PANEL: CPT

## 2019-10-24 PROCEDURE — 82728 ASSAY OF FERRITIN: CPT

## 2019-10-24 PROCEDURE — 84443 ASSAY THYROID STIM HORMONE: CPT

## 2019-10-24 PROCEDURE — 82607 VITAMIN B-12: CPT

## 2019-10-25 DIAGNOSIS — R26.89 IMBALANCE: Primary | ICD-10-CM

## 2019-10-25 NOTE — PROGRESS NOTES
Patient therapist had called with concerns of patient inbalance  States that some days its good and others she rapidly declines  She suggested the pt have an MRI of the brain  Ispoke to Dr Cheyenne Foote and advised of the pt symptoms, he agreed with ordering the MRI of the Brain  I called to notify the patient but was unable to reach her, I left a message for her to call back     Therapist Ramon Nassar) from [facility]: phone# [phone]

## 2019-11-08 ENCOUNTER — TELEPHONE (OUTPATIENT)
Dept: INTERNAL MEDICINE CLINIC | Facility: CLINIC | Age: 84
End: 2019-11-08

## 2019-11-08 NOTE — TELEPHONE ENCOUNTER
----- Message from Elieser Humphrey MD sent at 10/27/2019 12:32 PM EDT -----  Labs ok  b12 low normal   Suggest OTC B12 supplememnt   500-1000 ug/d

## 2019-11-12 ENCOUNTER — HOSPITAL ENCOUNTER (OUTPATIENT)
Dept: MRI IMAGING | Facility: HOSPITAL | Age: 84
Discharge: HOME | End: 2019-11-12
Payer: MEDICARE

## 2019-11-12 DIAGNOSIS — R26.89 IMBALANCE: ICD-10-CM

## 2019-11-12 PROCEDURE — 70553 MRI BRAIN STEM W/O & W/DYE: CPT

## 2019-11-12 PROCEDURE — A9585 GADOBUTROL INJECTION: HCPCS | Performed by: INTERNAL MEDICINE

## 2019-11-12 RX ADMIN — GADOBUTROL 6 ML: 604.72 INJECTION INTRAVENOUS at 14:49

## 2019-11-21 ENCOUNTER — TELEPHONE (OUTPATIENT)
Dept: INTERNAL MEDICINE CLINIC | Facility: CLINIC | Age: 84
End: 2019-11-21

## 2019-12-03 DIAGNOSIS — E78.5 HYPERLIPIDEMIA, UNSPECIFIED HYPERLIPIDEMIA TYPE: ICD-10-CM

## 2019-12-03 RX ORDER — PRAVASTATIN SODIUM 20 MG
TABLET ORAL
Qty: 90 TABLET | Refills: 4 | Status: SHIPPED | OUTPATIENT
Start: 2019-12-03 | End: 2020-12-14 | Stop reason: SDUPTHER

## 2020-01-03 ENCOUNTER — APPOINTMENT (OUTPATIENT)
Dept: RADIOLOGY | Facility: MEDICAL CENTER | Age: 85
End: 2020-01-03
Payer: MEDICARE

## 2020-01-03 ENCOUNTER — OFFICE VISIT (OUTPATIENT)
Dept: URGENT CARE | Facility: MEDICAL CENTER | Age: 85
End: 2020-01-03
Payer: MEDICARE

## 2020-01-03 VITALS
OXYGEN SATURATION: 98 % | TEMPERATURE: 97.4 F | SYSTOLIC BLOOD PRESSURE: 162 MMHG | BODY MASS INDEX: 22.15 KG/M2 | HEIGHT: 63 IN | DIASTOLIC BLOOD PRESSURE: 104 MMHG | WEIGHT: 125 LBS | HEART RATE: 84 BPM | RESPIRATION RATE: 16 BRPM

## 2020-01-03 DIAGNOSIS — S09.90XA INJURY OF HEAD, INITIAL ENCOUNTER: ICD-10-CM

## 2020-01-03 DIAGNOSIS — S53.401A SPRAIN OF RIGHT ELBOW, INITIAL ENCOUNTER: Primary | ICD-10-CM

## 2020-01-03 DIAGNOSIS — S53.401A SPRAIN OF RIGHT ELBOW, INITIAL ENCOUNTER: ICD-10-CM

## 2020-01-03 PROCEDURE — 73080 X-RAY EXAM OF ELBOW: CPT

## 2020-01-03 PROCEDURE — G0463 HOSPITAL OUTPT CLINIC VISIT: HCPCS | Performed by: PHYSICIAN ASSISTANT

## 2020-01-03 PROCEDURE — 29105 APPLICATION LONG ARM SPLINT: CPT | Performed by: FAMILY MEDICINE

## 2020-01-03 PROCEDURE — 99213 OFFICE O/P EST LOW 20 MIN: CPT | Performed by: PHYSICIAN ASSISTANT

## 2020-01-04 NOTE — PATIENT INSTRUCTIONS
Elbow Sprain   WHAT YOU NEED TO KNOW:   An elbow sprain is caused by a stretched or torn ligament in the elbow joint  Ligaments are the strong tissues that connect bones  DISCHARGE INSTRUCTIONS:   Return to the emergency department if:   · The skin of your injured arm looks bluish or pale (less color than normal)  · You have new or increased numbness in your injured arm  Contact your healthcare provider if:   · You have increased swelling and pain in your elbow  · You have new or increased stiffness or trouble moving your injured arm  · You have questions or concerns about your condition or care  Medicines:   · Prescription pain medicine  may be given  Ask how to take this medicine safely  · Take your medicine as directed  Contact your healthcare provider if you think your medicine is not helping or if you have side effects  Tell him or her if you are allergic to any medicine  Keep a list of the medicines, vitamins, and herbs you take  Include the amounts, and when and why you take them  Bring the list or the pill bottles to follow-up visits  Carry your medicine list with you in case of an emergency  Rest your elbow: You will need to rest your elbow for 1 to 2 days after your injury  This will help decrease the risk of more damage to your elbow  Ice your elbow:  Apply ice on your elbow for 15 to 20 minutes every hour or as directed  Use an ice pack, or put crushed ice in a plastic bag  Cover it with a towel  Ice helps prevent tissue damage and decreases swelling and pain  Compress your elbow:  Compression provides support and helps decrease swelling and movement so your elbow can heal  You may be told to keep your elbow wrapped with a tight elastic bandage  Follow instructions about how to apply your bandage  Elevate your elbow:  Elevate your elbow above the level of your heart as often as you can  This will help decrease swelling and pain   Prop your elbow on pillows or blankets to keep it elevated comfortably  Exercise your elbow: You should begin to exercise your arm in a few days, once you are able to move your elbow without pain  Exercises will help decrease stiffness and improve the strength of your arm  Ask your healthcare provider what kind of exercises you should do  Prevent another elbow sprain:   · Make sure you warm up and stretch before you exercise  · Do not exercise when you feel pain or you are tired  · Wear equipment to protect yourself when you play sports  · Stop exercising and playing sports if your symptoms from a past injury return  Follow up with your healthcare provider within 1 week:  Write down any questions you have so you remember to ask them in your follow-up visits  © 2017 [employer]  Information is for End User's use only and may not be sold, redistributed or otherwise used for commercial purposes  All illustrations and images included in CareNotes® are the copyrighted property of A D A M [employer]  or [employer]  The above information is an  only  It is not intended as medical advice for individual conditions or treatments  Talk to your doctor, nurse or pharmacist before following any medical regimen to see if it is safe and effective for you  Head Injury   WHAT YOU NEED TO KNOW:   A head injury is most often caused by a blow to the head  This may occur from a fall, bicycle injury, sports injury, being struck in the head, or a motor vehicle accident  DISCHARGE INSTRUCTIONS:   Call 911 or have someone else call for any of the following:   · You cannot be woken  · You have a seizure  · You stop responding to others or you faint  · You have blurry or double vision  · Your speech becomes slurred or confused  · You have arm or leg weakness, loss of feeling, or new problems with coordination  · Your pupils are larger than usual or one pupil is a different size than the other       · You have blood or clear fluid coming out of your ears or nose  Return to the emergency department if:   · You have repeated or forceful vomiting  · You feel confused  · Your headache gets worse or becomes severe  · You or someone caring for you notices that you are harder to wake than usual   Contact your healthcare provider if:   · Your symptoms last longer than 6 weeks after the injury  · You have questions or concerns about your condition or care  Medicines:   · Acetaminophen  decreases pain  Acetaminophen is available without a doctor's order  Ask how much to take and how often to take it  Follow directions  Acetaminophen can cause liver damage if not taken correctly  · Take your medicine as directed  Contact your healthcare provider if you think your medicine is not helping or if you have side effects  Tell him or her if you are allergic to any medicine  Keep a list of the medicines, vitamins, and herbs you take  Include the amounts, and when and why you take them  Bring the list or the pill bottles to follow-up visits  Carry your medicine list with you in case of an emergency  Self-care:   · Rest  or do quiet activities for 24 to 48 hours  Limit your time watching TV, using the computer, or doing tasks that require a lot of thinking  Slowly return to your normal activities as directed  Do not play sports or do activities that may cause you to get hit in the head  Ask your healthcare provider when you can return to sports  · Apply ice  on your head for 15 to 20 minutes every hour or as directed  Use an ice pack, or put crushed ice in a plastic bag  Cover it with a towel before you apply it to your skin  Ice helps prevent tissue damage and decreases swelling and pain  · Have someone stay with you for 24 hours  or as directed  This person can monitor you for complications and call [phone]  When you are awake the person should ask you a few questions to see if you are thinking clearly   An example would be to ask your name or your address  Prevent another head injury:   · Wear a helmet that fits properly  Do this when you play sports, or ride a bike, scooter, or skateboard  Helmets help decrease your risk of a serious head injury  Talk to your healthcare provider about other ways you can protect yourself if you play sports  · Wear your seat belt every time you are in a car  This helps to decrease your risk for a head injury if you are in a car accident  Follow up with your healthcare provider as directed:  Write down your questions so you remember to ask them during your visits  © 2017 [employer] Information is for End User's use only and may not be sold, redistributed or otherwise used for commercial purposes  All illustrations and images included in CareNotes® are the copyrighted property of [employer]  or [employer]  The above information is an  only  It is not intended as medical advice for individual conditions or treatments  Talk to your doctor, nurse or pharmacist before following any medical regimen to see if it is safe and effective for you

## 2020-01-04 NOTE — PROGRESS NOTES
[facility]        NAME: Prema Campos is a 80 y o  female  : 1930    MRN: [id number]  DATE: January 3, 2020  TIME: 7:35 PM    BP (!) 162/104   Pulse 84   Temp (!) 97 4 °F (36 3 °C)   Resp 16   Ht 5' 3" (1 6 m)   Wt 56 7 kg (125 lb)   SpO2 98%   BMI 22 14 kg/m²     Assessment and Plan   Sprain of right elbow, initial encounter [S53 401A]  1  Sprain of right elbow, initial encounter  XR elbow 3+ vw right    Splint   2  Injury of head, initial encounter           Patient Instructions       Follow up with PCP in 3-5 days  Proceed to  ER if symptoms worsen  Chief Complaint     Chief Complaint   Patient presents with    Elbow Pain     Right elbow pain s/p fall today  Rates pain at 8/10  Pt is currently on eliquis  S         History of Present Illness       Pt with slip and fall   Against wall  Hit head slightly  And fell onto right elbow       Arm Pain    The incident occurred 3 to 6 hours ago  The incident occurred at home  The injury mechanism was a fall  The pain is present in the right elbow  The quality of the pain is described as aching  The pain is at a severity of 3/10  The pain is moderate  The pain has been constant since the incident  Pertinent negatives include no chest pain, muscle weakness, numbness or tingling  Nothing aggravates the symptoms  She has tried nothing for the symptoms  The treatment provided no relief  Review of Systems   Review of Systems   Constitutional: Negative  HENT: Negative  Eyes: Negative  Respiratory: Negative  Cardiovascular: Negative  Negative for chest pain  Gastrointestinal: Negative  Genitourinary: Negative  Musculoskeletal: Negative  Skin: Negative  Allergic/Immunologic: Negative  Neurological: Negative  Negative for tingling and numbness  Hematological: Negative  Psychiatric/Behavioral: Negative  All other systems reviewed and are negative          Current Medications       Current Outpatient Medications:     bisoprolol (ZEBETA) 5 mg tablet, TAKE ONE-HALF (1/2) TABLET DAILY AS NEEDED FOR PALPITATIONS, Disp: 90 tablet, Rfl: 4    DIGOX 125 MCG tablet, TAKE 1 TABLET DAILY, Disp: 90 tablet, Rfl: 4    ELIQUIS 2 5 MG, TAKE 1 TABLET TWICE A DAY (REPLACES WARFARIN), Disp: 180 tablet, Rfl: 3    fluticasone-salmeterol (ADVAIR DISKUS, WIXELA INHUB) 250-50 mcg/dose inhaler, USE 1 INHALATION TWO TIMES A DAY, Disp: 60 each, Rfl: 4    Multiple Vitamins-Minerals (PRESERVISION AREDS 2+MULTI VIT) CAPS, Take 1 capsule by mouth 2 (two) times a day , Disp: , Rfl:     omeprazole (PriLOSEC) 40 MG capsule, TAKE 1 CAPSULE DAILY, Disp: 90 capsule, Rfl: 4    pravastatin (PRAVACHOL) 20 mg tablet, TAKE 1 TABLET DAILY, Disp: 90 tablet, Rfl: 4    Current Allergies     Allergies as of 01/03/2020    (No Known Allergies)            The following portions of the patient's history were reviewed and updated as appropriate: allergies, current medications, past family history, past medical history, past social history, past surgical history and problem list      Past Medical History:   Diagnosis Date    Atrial fibrillation (HCC)     Fracture of ankle     Hyperlipidemia     Hypertension     Macular degeneration     Osteoarthritis     Vitamin D deficiency        Past Surgical History:   Procedure Laterality Date    APPENDECTOMY      PERINEAL  PELVIC RECONSTRUCTION SURGERY         Family History   Problem Relation Age of Onset    Supraventricular tachycardia Family          Medications have been verified  Objective   BP (!) 162/104   Pulse 84   Temp (!) 97 4 °F (36 3 °C)   Resp 16   Ht 5' 3" (1 6 m)   Wt 56 7 kg (125 lb)   SpO2 98%   BMI 22 14 kg/m²        Physical Exam     Physical Exam   Constitutional: She is oriented to person, place, and time  She appears well-developed and well-nourished     Pt denies any headache     Pt decline er now for ct scan eval   Pt will have an adult with her this wilma and will wake up every 2 hours for eval    HENT:   Head: Normocephalic  Right Ear: External ear normal    Left Ear: External ear normal    Nose: Nose normal    Mouth/Throat: Oropharynx is clear and moist    Eyes: Pupils are equal, round, and reactive to light  Conjunctivae and EOM are normal    Neck: Normal range of motion  Neck supple  Cardiovascular: Normal rate, regular rhythm, normal heart sounds and intact distal pulses  Pulmonary/Chest: Effort normal and breath sounds normal    Abdominal: Soft  Bowel sounds are normal    Musculoskeletal: Normal range of motion  Right elbow swelling and ecchymosis  Pain on full flexion and ext  Distal paresthesias    Neurological: She is alert and oriented to person, place, and time  Skin: Skin is warm  Capillary refill takes less than 2 seconds  Psychiatric: She has a normal mood and affect  Her behavior is normal  Thought content normal    Nursing note and vitals reviewed      Splint application  Date/Time: 1/3/2020 7:32 PM  Performed by: David Krause PA-C  Authorized by: David Krause PA-C     Consent:     Consent obtained:  Verbal    Consent given by:  Patient    Risks discussed:  Discoloration, numbness, pain and swelling    Alternatives discussed:  No treatment  Pre-procedure details:     Sensation:  Normal  Procedure details:     Laterality:  Right    Location:  Elbow    Elbow:  R elbow    Strapping: no  Cast type:  Long arm    Splint type:  Long arm    Supplies:  [facility]

## 2020-01-14 ENCOUNTER — OFFICE VISIT (OUTPATIENT)
Dept: CARDIOLOGY CLINIC | Facility: CLINIC | Age: 85
End: 2020-01-14
Payer: MEDICARE

## 2020-01-14 VITALS
SYSTOLIC BLOOD PRESSURE: 140 MMHG | BODY MASS INDEX: 22.39 KG/M2 | DIASTOLIC BLOOD PRESSURE: 70 MMHG | WEIGHT: 126.4 LBS | HEIGHT: 63 IN

## 2020-01-14 DIAGNOSIS — I48.0 PAROXYSMAL ATRIAL FIBRILLATION (HCC): Primary | ICD-10-CM

## 2020-01-14 DIAGNOSIS — I10 ESSENTIAL HYPERTENSION: ICD-10-CM

## 2020-01-14 DIAGNOSIS — E78.2 MIXED HYPERLIPIDEMIA: ICD-10-CM

## 2020-01-14 PROCEDURE — 99214 OFFICE O/P EST MOD 30 MIN: CPT | Performed by: INTERNAL MEDICINE

## 2020-01-14 PROCEDURE — 93000 ELECTROCARDIOGRAM COMPLETE: CPT | Performed by: INTERNAL MEDICINE

## 2020-01-14 RX ORDER — HYDROCHLOROTHIAZIDE 25 MG/1
TABLET ORAL ONCE AS NEEDED
COMMUNITY
Start: 2009-03-02 | End: 2020-01-14 | Stop reason: ALTCHOICE

## 2020-01-14 NOTE — PROGRESS NOTES
Cardiology Follow Up    Natalio Garcia  7/9/1930  [address] [address]  [phone]    Reason for visit: 6 month FU for PAF  Aayush Joy Also has HTH, HLP and hx of palpitations    1  Paroxysmal atrial fibrillation (HCC)  POCT ECG   2  Essential hypertension     3  Mixed hyperlipidemia         Interval History: the patient fell on the third and developed an extensive hematoma of the right arm    She has had some palpitations and elevated HRs    She also has had erratic BPs  She denies chest pain    She does get some ZALDIVAR    She does have left ankle edema (previous fracture)  She does have balance problems but no dizziness  Patient Active Problem List   Diagnosis    Anemia    Hyperlipidemia    Hypertension    Paroxysmal atrial fibrillation (HCC)    Premature ventricular contraction    Vitamin D deficiency    Esophageal reflux     Past Medical History:   Diagnosis Date    [facility]     Fracture of ankle     Hyperlipidemia     Hypertension     Macular degeneration     Osteoarthritis     Vitamin D deficiency      Social History     Socioeconomic History    Marital status:       Spouse name: Not on file    Number of children: Not on file    Years of education: Not on file    Highest education level: Not on file   Occupational History    Not on file   Social Needs    Financial resource strain: Not on file    Food insecurity:     Worry: Not on file     Inability: Not on file    Transportation needs:     Medical: Not on file     Non-medical: Not on file   Tobacco Use    Smoking status: Never Smoker    Smokeless tobacco: Never Used   Substance and Sexual Activity    Alcohol use: No     Comment: Social drinker per [employer]    Drug use: No    Sexual activity: Not on file   Lifestyle    Physical activity:     Days per week: Not on file     Minutes per session: Not on file    Stress: Not on file   Relationships    Social connections:     Talks on phone: Not on file     Gets together: Not on file     Attends Restoration[employer] service: Not on file     Active member of club or organization: Not on file     Attends meetings of clubs or organizations: Not on file     Relationship status: Not on file    Intimate partner violence:     Fear of current or ex partner: Not on file     Emotionally abused: Not on file     Physically abused: Not on file     Forced sexual activity: Not on file   Other Topics Concern    Not on file   Social History Narrative    Not on file      Family History   Problem Relation Age of Onset    Supraventricular tachycardia Family      Past Surgical History:   Procedure Laterality Date    APPENDECTOMY      PERINEAL  PELVIC RECONSTRUCTION SURGERY         Current Outpatient Medications:     bisoprolol (ZEBETA) 5 mg tablet, TAKE ONE-HALF (1/2) TABLET DAILY AS NEEDED FOR PALPITATIONS, Disp: 90 tablet, Rfl: 4    DIGOX 125 MCG tablet, TAKE 1 TABLET DAILY, Disp: 90 tablet, Rfl: 4    ELIQUIS 2 5 MG, TAKE 1 TABLET TWICE A DAY (REPLACES WARFARIN), Disp: 180 tablet, Rfl: 3    fluticasone-salmeterol (ADVAIR DISKUS, WIXELA INHUB) 250-50 mcg/dose inhaler, USE 1 INHALATION TWO TIMES A DAY, Disp: 60 each, Rfl: 4    hydrochlorothiazide (HYDRODIURIL) 25 mg tablet, once as needed , Disp: , Rfl:     Multiple Vitamins-Minerals (PRESERVISION AREDS 2+MULTI VIT) CAPS, Take 1 capsule by mouth 2 (two) times a day , Disp: , Rfl:     omeprazole (PriLOSEC) 40 MG capsule, TAKE 1 CAPSULE DAILY, Disp: 90 capsule, Rfl: 4    pravastatin (PRAVACHOL) 20 mg tablet, TAKE 1 TABLET DAILY, Disp: 90 tablet, Rfl: 4  No Known Allergies      Review of Systems:  Review of Systems   Constitutional: Positive for appetite change and fatigue  Negative for activity change and unexpected weight change  Respiratory: Positive for shortness of breath  Negative for cough, chest tightness and wheezing  Cardiovascular: Positive for palpitations and leg swelling (LLE)  Negative for chest pain  Gastrointestinal: Positive for constipation  Negative for abdominal pain, blood in stool and diarrhea  Genitourinary: Negative for dysuria, frequency, hematuria and urgency  Musculoskeletal: Positive for arthralgias, back pain and gait problem  Negative for joint swelling  Neurological: Negative for dizziness, syncope, light-headedness and headaches  Psychiatric/Behavioral: Negative for agitation, behavioral problems, confusion and decreased concentration  Physical Exam:  Vitals:    01/14/20 1416   BP: 140/70   BP Location: Left arm   Patient Position: Sitting   Cuff Size: Adult   Weight: 57 3 kg (126 lb 6 4 oz)   Height: 5' 3" (1 6 m)       Physical Exam   Constitutional: She is oriented to person, place, and time  She appears well-developed and well-nourished  No distress  HENT:   Head: Normocephalic and atraumatic  Mouth/Throat: Oropharynx is clear and moist  No oropharyngeal exudate  Eyes: Conjunctivae are normal  No scleral icterus  Neck: Neck supple  Normal carotid pulses and no JVD present  Carotid bruit is not present  No thyromegaly present  Cardiovascular: Normal rate  An irregularly irregular rhythm present  Exam reveals no friction rub  No murmur heard  Pulses:       Dorsalis pedis pulses are 2+ on the right side, and 2+ on the left side  Pulmonary/Chest: Breath sounds normal  She has no wheezes  She has no rhonchi  She has no rales  Abdominal: Soft  She exhibits no mass  There is no hepatosplenomegaly  There is no tenderness  Musculoskeletal: She exhibits edema (1+ L>RLE) and deformity (kyphosis)  She exhibits no tenderness  Neurological: She is alert and oriented to person, place, and time  She has normal strength  No cranial nerve deficit or sensory deficit  Skin: Skin is warm and dry  No rash noted  No erythema  No pallor  Psychiatric: She has a normal mood and affect  Her behavior is normal  Judgment and thought content normal        Discussion/Summary:  1  PAF  In AFIB now and suspect persistent AFIB at this point    Rate by me is 88 bpm  Continue digoxin at current dosage    May be best managed by pacer and AVN ablation    Continue Eliquis for CVA prevention  2  HTN-BP reasonable    Uses bisoprolol 2 5 mg prn SBP>150      3  Mixed HLP-on statin rx-LDL 99 in 2018  Narcisa Pelican lfts ok recently  Narcisa Aspen No PVD/CAD  Narcisa Aspen  Continue same dosage      FU 6 months    Ochoa Santiago MD

## 2020-01-15 ENCOUNTER — TRANSCRIBE ORDERS (OUTPATIENT)
Dept: LAB | Facility: HOSPITAL | Age: 85
End: 2020-01-15

## 2020-01-15 DIAGNOSIS — I48.0 PAROXYSMAL ATRIAL FIBRILLATION (HCC): Primary | ICD-10-CM

## 2020-01-21 ENCOUNTER — OFFICE VISIT (OUTPATIENT)
Dept: INTERNAL MEDICINE CLINIC | Facility: CLINIC | Age: 85
End: 2020-01-21
Payer: MEDICARE

## 2020-01-21 VITALS
HEIGHT: 63 IN | DIASTOLIC BLOOD PRESSURE: 80 MMHG | OXYGEN SATURATION: 97 % | HEART RATE: 104 BPM | TEMPERATURE: 98.8 F | BODY MASS INDEX: 21.44 KG/M2 | SYSTOLIC BLOOD PRESSURE: 146 MMHG | WEIGHT: 121 LBS

## 2020-01-21 DIAGNOSIS — I48.0 PAROXYSMAL ATRIAL FIBRILLATION (HCC): ICD-10-CM

## 2020-01-21 DIAGNOSIS — I10 ESSENTIAL HYPERTENSION: ICD-10-CM

## 2020-01-21 DIAGNOSIS — J20.9 BRONCHITIS WITH BRONCHOSPASM: Primary | ICD-10-CM

## 2020-01-21 PROCEDURE — 99213 OFFICE O/P EST LOW 20 MIN: CPT | Performed by: INTERNAL MEDICINE

## 2020-01-21 RX ORDER — PREDNISONE 10 MG/1
TABLET ORAL
Qty: 20 TABLET | Refills: 0 | Status: SHIPPED | OUTPATIENT
Start: 2020-01-21 | End: 2020-08-07

## 2020-01-21 NOTE — PROGRESS NOTES
[facility] [place]      NAME: Linda Malik  AGE: 80 y o  SEX: female  : 1930   MRN: [id number]    DATE: 2020  TIME: 5:34 PM    Assessment and Plan   1  Bronchitis with bronchospasm  The patient will use her Advair twice daily  A short course of prednisone was recommended  At present, there is no sign of active infection and so antibiotics were not prescribed  - predniSONE 10 mg tablet; 4 for 2 days, 3 for 2 days, 2 for 2 days, 1 for 2 days  Dispense: 20 tablet; Refill: 0    2  Paroxysmal atrial fibrillation (HCC)  On rate control and Eliquis  3  Essential hypertension  Adequately controlled on current medications  Return to office in:  As scheduled    Chief Complaint     Chief Complaint   Patient presents with    Cough    URI       History of Present Illness     The patient came to the office on an emergency basis today because of cough and wheezing  She got sick about 1 week ago  She presume that she had a viral bronchitis  She was coughing and bringing up a substantial amount of sputum  This was not discolored  She had no fever  She had no chest pain or shortness of breath  She seemed to get somewhat better for a day or so but then has worsened  She is coughing persistently  She has quite a bit of clear sputum  She is wheezing  She started an Advair inhaler but has not yet gotten any relief  She does have some abdominal pain related to her abdominal musculature  She has no hemoptysis  The following portions of the patient's history were reviewed and updated as appropriate: allergies, current medications, past family history, past medical history, past social history, past surgical history and problem list     Review of Systems   Review of Systems   Constitutional: Negative  HENT: Negative for congestion, ear pain, postnasal drip, rhinorrhea, sore throat and trouble swallowing      Eyes: Negative for pain, discharge, redness and visual disturbance  Respiratory: Positive for cough and wheezing  Negative for shortness of breath  Cardiovascular: Negative  Gastrointestinal: Negative  Endocrine: Negative  Genitourinary: Negative for difficulty urinating, dysuria, frequency, hematuria and urgency  Musculoskeletal: Negative for arthralgias, gait problem, joint swelling and myalgias  Skin: Negative for rash  Neurological: Negative for dizziness, speech difficulty, weakness, light-headedness, numbness and headaches  Hematological: Negative  Psychiatric/Behavioral: Negative for confusion, decreased concentration, dysphoric mood and sleep disturbance  The patient is not nervous/anxious  Active Problem List     Patient Active Problem List   Diagnosis    Anemia    Hyperlipidemia    Hypertension    Paroxysmal atrial fibrillation (HCC)    Premature ventricular contraction    Vitamin D deficiency    Esophageal reflux       Objective   /80 (BP Location: Left arm, Patient Position: Sitting, Cuff Size: Standard)   Pulse 104   Temp 98 8 °F (37 1 °C) (Tympanic)   Ht 5' 3" (1 6 m)   Wt 54 9 kg (121 lb)   SpO2 97%   BMI 21 43 kg/m²     Physical Exam   Constitutional: She is oriented to person, place, and time  She appears well-developed and well-nourished  No distress  HENT:   Head: Normocephalic and atraumatic  Right Ear: External ear normal    Left Ear: External ear normal    Nose: Nose normal    Mouth/Throat: Oropharynx is clear and moist    Neck: Neck supple  No JVD present  No tracheal deviation present  No thyromegaly present  Cardiovascular: Normal rate, regular rhythm, normal heart sounds and intact distal pulses  Exam reveals no gallop and no friction rub  No murmur heard  Pulmonary/Chest: Effort normal  She has wheezes  She has no rales  She exhibits no tenderness  Abdominal: Soft  Bowel sounds are normal  She exhibits no distension and no mass  There is no tenderness  There is no rebound  Musculoskeletal: Normal range of motion  She exhibits no edema or tenderness  Lymphadenopathy:     She has no cervical adenopathy  Neurological: She is alert and oriented to person, place, and time  Skin: Skin is warm and dry  Psychiatric: She has a normal mood and affect   Her behavior is normal  Judgment and thought content normal        Pertinent Laboratory/Diagnostic Studies:  Appointment on 10/24/2019   Component Date Value Ref Range Status    Sodium 10/24/2019 143  136 - 145 mmol/L Final    Potassium 10/24/2019 4 2  3 5 - 5 3 mmol/L Final    Chloride 10/24/2019 110* 100 - 108 mmol/L Final    CO2 10/24/2019 27  21 - 32 mmol/L Final    ANION GAP 10/24/2019 6  4 - 13 mmol/L Final    BUN 10/24/2019 23  5 - 25 mg/dL Final    Creatinine 10/24/2019 0 86  0 60 - 1 30 mg/dL Final    Glucose 10/24/2019 67  65 - 140 mg/dL Final    Calcium 10/24/2019 8 9  8 3 - 10 1 mg/dL Final    AST 10/24/2019 24  5 - 45 U/L Final    ALT 10/24/2019 35  12 - 78 U/L Final    Alkaline Phosphatase 10/24/2019 154* 46 - 116 U/L Final    Total Protein 10/24/2019 7 3  6 4 - 8 2 g/dL Final    Albumin 10/24/2019 4 4  3 5 - 5 0 g/dL Final    Total Bilirubin 10/24/2019 0 73  0 20 - 1 00 mg/dL Final    eGFR 10/24/2019 60  ml/min/1 73sq m Final    Vitamin B-12 10/24/2019 247  100 - 900 pg/mL Final    Ferritin 10/24/2019 34  8 - 388 ng/mL Final    WBC 10/24/2019 7 30  4 31 - 10 16 Thousand/uL Final    RBC 10/24/2019 4 57  3 81 - 5 12 Million/uL Final    Hemoglobin 10/24/2019 13 2  11 5 - 15 4 g/dL Final    Hematocrit 10/24/2019 43 3  34 8 - 46 1 % Final    MCV 10/24/2019 95  82 - 98 fL Final    MCH 10/24/2019 28 9  26 8 - 34 3 pg Final    MCHC 10/24/2019 30 5* 31 4 - 37 4 g/dL Final    RDW 10/24/2019 14 6  11 6 - 15 1 % Final    MPV 10/24/2019 11 3  8 9 - 12 7 fL Final    Platelets 49/31/9641 230  149 - 390 Thousands/uL Final    nRBC 10/24/2019 0  /100 WBCs Final    Neutrophils Relative 10/24/2019 69  43 - 75 % Final    Immat GRANS % 10/24/2019 0  0 - 2 % Final    Lymphocytes Relative 10/24/2019 18  14 - 44 % Final    Monocytes Relative 10/24/2019 9  4 - 12 % Final    Eosinophils Relative 10/24/2019 3  0 - 6 % Final    Basophils Relative 10/24/2019 1  0 - 1 % Final    Neutrophils Absolute 10/24/2019 4 98  1 85 - 7 62 Thousands/µL Final    Immature Grans Absolute 10/24/2019 0 02  0 00 - 0 20 Thousand/uL Final    Lymphocytes Absolute 10/24/2019 1 34  0 60 - 4 47 Thousands/µL Final    Monocytes Absolute 10/24/2019 0 67  0 17 - 1 22 Thousand/µL Final    Eosinophils Absolute 10/24/2019 0 21  0 00 - 0 61 Thousand/µL Final    Basophils Absolute 10/24/2019 0 08  0 00 - 0 10 Thousands/µL Final    TSH 3RD GENERATON 10/24/2019 3 840* 0 358 - 3 740 uIU/mL Final    Venipuncture 10/24/2019 Collected   Final           Current Medications     Current Outpatient Medications:     bisoprolol (ZEBETA) 5 mg tablet, TAKE ONE-HALF (1/2) TABLET DAILY AS NEEDED FOR PALPITATIONS, Disp: 90 tablet, Rfl: 4    DIGOX 125 MCG tablet, TAKE 1 TABLET DAILY, Disp: 90 tablet, Rfl: 4    ELIQUIS 2 5 MG, TAKE 1 TABLET TWICE A DAY (REPLACES WARFARIN), Disp: 180 tablet, Rfl: 3    fluticasone-salmeterol (ADVAIR DISKUS, WIXELA INHUB) 250-50 mcg/dose inhaler, USE 1 INHALATION TWO TIMES A DAY, Disp: 60 each, Rfl: 4    Multiple Vitamins-Minerals (PRESERVISION AREDS 2+MULTI VIT) CAPS, Take 1 capsule by mouth 2 (two) times a day , Disp: , Rfl:     omeprazole (PriLOSEC) 40 MG capsule, TAKE 1 CAPSULE DAILY, Disp: 90 capsule, Rfl: 4    pravastatin (PRAVACHOL) 20 mg tablet, TAKE 1 TABLET DAILY, Disp: 90 tablet, Rfl: 4    predniSONE 10 mg tablet, 4 for 2 days, 3 for 2 days, 2 for 2 days, 1 for 2 days, Disp: 20 tablet, Rfl: 0      Sergey Kern MD

## 2020-01-24 ENCOUNTER — APPOINTMENT (OUTPATIENT)
Dept: LAB | Facility: HOSPITAL | Age: 85
End: 2020-01-24
Attending: INTERNAL MEDICINE
Payer: MEDICARE

## 2020-01-24 DIAGNOSIS — I48.0 PAROXYSMAL ATRIAL FIBRILLATION (HCC): ICD-10-CM

## 2020-01-24 LAB
DIGOXIN SERPL-MCNC: 0.5 NG/ML (ref 0.8–2)
VENIPUNCTURE: NORMAL

## 2020-01-24 PROCEDURE — 80162 ASSAY OF DIGOXIN TOTAL: CPT

## 2020-01-24 PROCEDURE — 36415 COLL VENOUS BLD VENIPUNCTURE: CPT

## 2020-04-02 DIAGNOSIS — I48.91 ATRIAL FIBRILLATION, UNSPECIFIED TYPE (HCC): ICD-10-CM

## 2020-04-02 RX ORDER — DIGOXIN 125 MCG
TABLET ORAL
Qty: 90 TABLET | Refills: 3 | Status: SHIPPED | OUTPATIENT
Start: 2020-04-02 | End: 2021-03-22

## 2020-05-19 DIAGNOSIS — I48.0 PAF (PAROXYSMAL ATRIAL FIBRILLATION) (HCC): ICD-10-CM

## 2020-08-07 ENCOUNTER — OFFICE VISIT (OUTPATIENT)
Dept: CARDIOLOGY CLINIC | Facility: CLINIC | Age: 85
End: 2020-08-07
Payer: MEDICARE

## 2020-08-07 VITALS
TEMPERATURE: 98.7 F | RESPIRATION RATE: 16 BRPM | WEIGHT: 124.4 LBS | BODY MASS INDEX: 22.04 KG/M2 | DIASTOLIC BLOOD PRESSURE: 72 MMHG | HEIGHT: 63 IN | HEART RATE: 84 BPM | SYSTOLIC BLOOD PRESSURE: 128 MMHG

## 2020-08-07 DIAGNOSIS — I48.19 PERSISTENT ATRIAL FIBRILLATION (HCC): Primary | ICD-10-CM

## 2020-08-07 DIAGNOSIS — I49.3 PREMATURE VENTRICULAR CONTRACTION: ICD-10-CM

## 2020-08-07 DIAGNOSIS — E78.2 MIXED HYPERLIPIDEMIA: ICD-10-CM

## 2020-08-07 DIAGNOSIS — I10 ESSENTIAL HYPERTENSION: ICD-10-CM

## 2020-08-07 PROCEDURE — 99214 OFFICE O/P EST MOD 30 MIN: CPT | Performed by: INTERNAL MEDICINE

## 2020-08-07 PROCEDURE — 1160F RVW MEDS BY RX/DR IN RCRD: CPT | Performed by: INTERNAL MEDICINE

## 2020-08-07 PROCEDURE — 3078F DIAST BP <80 MM HG: CPT | Performed by: INTERNAL MEDICINE

## 2020-08-07 PROCEDURE — 3074F SYST BP LT 130 MM HG: CPT | Performed by: INTERNAL MEDICINE

## 2020-08-07 PROCEDURE — 4040F PNEUMOC VAC/ADMIN/RCVD: CPT | Performed by: INTERNAL MEDICINE

## 2020-08-07 PROCEDURE — 3008F BODY MASS INDEX DOCD: CPT | Performed by: INTERNAL MEDICINE

## 2020-08-07 PROCEDURE — 1036F TOBACCO NON-USER: CPT | Performed by: INTERNAL MEDICINE

## 2020-08-07 NOTE — PROGRESS NOTES
Cardiology Follow Up    Daniel Strauss  7/9/1930  [address] [place] [address]  [phone]    Reason for visit: 6 month FU for PAF, HTN, HLP and hx of palpitations      1  Paroxysmal atrial fibrillation (HCC)     2  Essential hypertension     3  Premature ventricular contraction     4  Mixed hyperlipidemia         Interval History:   Since her last visit she has had intermittent elevated BPs    She attributes this to stress  She states the palpitations have not been bothersome lately  She denies chest pain  She does get some ZALDIVAR which is not worse  She denies dizziness  She is taking the bisoprolol if SBP is over 150 mm Hg     Patient Active Problem List   Diagnosis    Anemia    Hyperlipidemia    Hypertension    Paroxysmal atrial fibrillation (HCC)    Premature ventricular contraction    Vitamin D deficiency    Esophageal reflux     Past Medical History:   Diagnosis Date    Atrial fibrillation ([facility] [address] )     Fracture of ankle     Hyperlipidemia     Hypertension     Macular degeneration     Osteoarthritis     Vitamin D deficiency      Social History     Socioeconomic History    Marital status:       Spouse name: Not on file    Number of children: Not on file    Years of education: Not on file    Highest education level: Not on file   Occupational History    Not on file   Social Needs    Financial resource strain: Not on file    Food insecurity     Worry: Not on file     Inability: Not on file    Transportation needs     Medical: Not on file     Non-medical: Not on file   Tobacco Use    Smoking status: Never Smoker    Smokeless tobacco: Never Used   Substance and Sexual Activity    Alcohol use: No     Comment: Social drinker per [employer]    Drug use: No    Sexual activity: Not on file   Lifestyle    Physical activity     Days per week: Not on file     Minutes per session: Not on file    Stress: Not on file   Relationships    Social connections     Talks on phone: Not on file     Gets together: Not on file     Attends [employer] service: Not on file     Active member of club or organization: Not on file     Attends meetings of clubs or organizations: Not on file     Relationship status: Not on file    Intimate partner violence     Fear of current or ex partner: Not on file     Emotionally abused: Not on file     Physically abused: Not on file     Forced sexual activity: Not on file   Other Topics Concern    Not on file   Social History Narrative    Not on file      Family History   Problem Relation Age of Onset    Supraventricular tachycardia Family      Past Surgical History:   Procedure Laterality Date    APPENDECTOMY      PERINEAL  PELVIC RECONSTRUCTION SURGERY         Current Outpatient Medications:     apixaban (Eliquis) 2 5 mg, Take 1 tablet (2 5 mg total) by mouth 2 (two) times a day, Disp: 180 tablet, Rfl: 3    bisoprolol (ZEBETA) 5 mg tablet, TAKE ONE-HALF (1/2) TABLET DAILY AS NEEDED FOR PALPITATIONS, Disp: 90 tablet, Rfl: 4    digoxin (LANOXIN) 0 125 mg tablet, TAKE 1 TABLET DAILY, Disp: 90 tablet, Rfl: 3    fluticasone-salmeterol (ADVAIR DISKUS, WIXELA INHUB) 250-50 mcg/dose inhaler, USE 1 INHALATION TWO TIMES A DAY, Disp: 60 each, Rfl: 4    Multiple Vitamins-Minerals (PRESERVISION AREDS 2+MULTI VIT) CAPS, Take 1 capsule by mouth 2 (two) times a day , Disp: , Rfl:     omeprazole (PriLOSEC) 40 MG capsule, TAKE 1 CAPSULE DAILY, Disp: 90 capsule, Rfl: 4    pravastatin (PRAVACHOL) 20 mg tablet, TAKE 1 TABLET DAILY, Disp: 90 tablet, Rfl: 4  No Known Allergies    Review of Systems:  Review of Systems   Constitutional: Positive for fatigue  Negative for activity change, appetite change and unexpected weight change  Respiratory: Positive for shortness of breath and wheezing  Negative for cough and chest tightness  Cardiovascular: Positive for palpitations  Negative for chest pain and leg swelling  Gastrointestinal: Positive for constipation  Negative for abdominal pain, blood in stool and nausea  Genitourinary: Negative for dysuria, frequency, hematuria and urgency  Musculoskeletal: Positive for arthralgias, back pain and gait problem  Negative for joint swelling  Neurological: Negative for dizziness, syncope, speech difficulty, light-headedness and headaches  Psychiatric/Behavioral: Negative for agitation, behavioral problems, confusion and decreased concentration  Physical Exam:  Vitals:    08/07/20 1344   BP: 128/72   Pulse: 84   Resp: 16   Temp: 98 7 °F (37 1 °C)   Weight: 56 4 kg (124 lb 6 4 oz)   Height: 5' 3" (1 6 m)       Physical Exam  Constitutional:       Appearance: Normal appearance  She is not ill-appearing, toxic-appearing or diaphoretic  HENT:      Head: Normocephalic and atraumatic  Right Ear: External ear normal       Left Ear: External ear normal       Mouth/Throat:      Mouth: Mucous membranes are moist       Pharynx: No oropharyngeal exudate or posterior oropharyngeal erythema  Eyes:      General: No scleral icterus  Conjunctiva/sclera: Conjunctivae normal    Neck:      Musculoskeletal: Neck supple  No neck rigidity or muscular tenderness  Vascular: No carotid bruit  Cardiovascular:      Rate and Rhythm: Normal rate  Rhythm irregular  Pulses:           Dorsalis pedis pulses are 2+ on the right side and 2+ on the left side  Heart sounds: No murmur  No friction rub  No gallop  Pulmonary:      Breath sounds: Normal breath sounds  No wheezing, rhonchi or rales  Abdominal:      Palpations: Abdomen is soft  There is no hepatomegaly or splenomegaly  Tenderness: There is no abdominal tenderness  Musculoskeletal:         General: Deformity (kyphosis) present  No tenderness  Right lower leg: Edema (trace) present  Left lower leg: Edema (1+) present     Skin:     General: Skin is warm and dry       Coloration: Skin is not pale  Neurological:      General: No focal deficit present  Mental Status: She is alert  Cranial Nerves: No cranial nerve deficit  Sensory: No sensory deficit  Motor: No weakness  Psychiatric:         Mood and Affect: Mood normal          Thought Content: Thought content normal          Judgment: Judgment normal          Discussion/Summary:  1  Persistent AFIB  Appears to be reasonable on digoxin 0 125 mg daily  She is on Eliquis for stroke prevention  Continue same  Rate appears to be easier to control now that she has persistent atrial fibrillation  2  Hypertension  Blood pressure fine today  She intermittently uses bisoprolol 2 5 mg p r n  Systolic blood pressure greater than 150  3  PVCs  Patient currently asymptomatic from these  4  Hyperlipidemia  Patient on pravastatin 20 mg daily    Historically good numbers in the past     Will check Digoxin level, CBC, BMP and FLP and AST in October FU 6 months    Jayna Butler MD

## 2020-09-28 DIAGNOSIS — J45.909 UNCOMPLICATED ASTHMA, UNSPECIFIED ASTHMA SEVERITY, UNSPECIFIED WHETHER PERSISTENT: ICD-10-CM

## 2020-10-02 ENCOUNTER — IMMUNIZATIONS (OUTPATIENT)
Dept: INTERNAL MEDICINE CLINIC | Facility: CLINIC | Age: 85
End: 2020-10-02
Payer: MEDICARE

## 2020-10-02 DIAGNOSIS — Z23 FLU VACCINE NEED: Primary | ICD-10-CM

## 2020-10-02 PROCEDURE — 90662 IIV NO PRSV INCREASED AG IM: CPT

## 2020-10-02 PROCEDURE — G0008 ADMIN INFLUENZA VIRUS VAC: HCPCS

## 2020-10-13 ENCOUNTER — TELEPHONE (OUTPATIENT)
Dept: LAB | Facility: HOSPITAL | Age: 85
End: 2020-10-13

## 2020-10-30 ENCOUNTER — APPOINTMENT (OUTPATIENT)
Dept: LAB | Facility: HOSPITAL | Age: 85
End: 2020-10-30
Attending: INTERNAL MEDICINE
Payer: MEDICARE

## 2020-10-30 DIAGNOSIS — E78.2 MIXED HYPERLIPIDEMIA: ICD-10-CM

## 2020-10-30 DIAGNOSIS — I48.19 PERSISTENT ATRIAL FIBRILLATION (HCC): ICD-10-CM

## 2020-10-30 DIAGNOSIS — I10 ESSENTIAL HYPERTENSION: ICD-10-CM

## 2020-10-30 LAB
ANION GAP SERPL CALCULATED.3IONS-SCNC: 5 MMOL/L (ref 4–13)
AST SERPL W P-5'-P-CCNC: 26 U/L (ref 5–45)
BUN SERPL-MCNC: 21 MG/DL (ref 5–25)
CALCIUM SERPL-MCNC: 8.7 MG/DL (ref 8.3–10.1)
CHLORIDE SERPL-SCNC: 108 MMOL/L (ref 100–108)
CHOLEST SERPL-MCNC: 192 MG/DL (ref 50–200)
CO2 SERPL-SCNC: 29 MMOL/L (ref 21–32)
CREAT SERPL-MCNC: 0.82 MG/DL (ref 0.6–1.3)
DIGOXIN SERPL-MCNC: 0.5 NG/ML (ref 0.8–2)
ERYTHROCYTE [DISTWIDTH] IN BLOOD BY AUTOMATED COUNT: 14.6 % (ref 11.6–15.1)
GFR SERPL CREATININE-BSD FRML MDRD: 63 ML/MIN/1.73SQ M
GLUCOSE P FAST SERPL-MCNC: 83 MG/DL (ref 65–99)
HCT VFR BLD AUTO: 44.1 % (ref 34.8–46.1)
HDLC SERPL-MCNC: 76 MG/DL
HGB BLD-MCNC: 13.7 G/DL (ref 11.5–15.4)
LDLC SERPL CALC-MCNC: 102 MG/DL (ref 0–100)
MCH RBC QN AUTO: 29.9 PG (ref 26.8–34.3)
MCHC RBC AUTO-ENTMCNC: 31.1 G/DL (ref 31.4–37.4)
MCV RBC AUTO: 96 FL (ref 82–98)
NONHDLC SERPL-MCNC: 116 MG/DL
PLATELET # BLD AUTO: 219 THOUSANDS/UL (ref 149–390)
PMV BLD AUTO: 10.8 FL (ref 8.9–12.7)
POTASSIUM SERPL-SCNC: 4.2 MMOL/L (ref 3.5–5.3)
RBC # BLD AUTO: 4.58 MILLION/UL (ref 3.81–5.12)
SODIUM SERPL-SCNC: 142 MMOL/L (ref 136–145)
TRIGL SERPL-MCNC: 69 MG/DL
WBC # BLD AUTO: 6.4 THOUSAND/UL (ref 4.31–10.16)

## 2020-10-30 PROCEDURE — 84450 TRANSFERASE (AST) (SGOT): CPT

## 2020-10-30 PROCEDURE — 80061 LIPID PANEL: CPT

## 2020-10-30 PROCEDURE — 85027 COMPLETE CBC AUTOMATED: CPT

## 2020-10-30 PROCEDURE — 80162 ASSAY OF DIGOXIN TOTAL: CPT

## 2020-10-30 PROCEDURE — 80048 BASIC METABOLIC PNL TOTAL CA: CPT

## 2020-11-16 ENCOUNTER — TELEMEDICINE (OUTPATIENT)
Dept: INTERNAL MEDICINE CLINIC | Facility: CLINIC | Age: 85
End: 2020-11-16
Payer: MEDICARE

## 2020-11-16 DIAGNOSIS — R20.0 NUMBNESS OF HAND: Primary | ICD-10-CM

## 2020-11-16 DIAGNOSIS — G56.20 ULNAR NERVE ENTRAPMENT AT ELBOW, UNSPECIFIED LATERALITY: ICD-10-CM

## 2020-11-16 PROCEDURE — 99213 OFFICE O/P EST LOW 20 MIN: CPT | Performed by: INTERNAL MEDICINE

## 2020-11-20 PROBLEM — R20.0 NUMBNESS OF HAND: Status: ACTIVE | Noted: 2020-11-20

## 2020-11-20 PROBLEM — G56.20 ULNAR NERVE ENTRAPMENT AT ELBOW: Status: ACTIVE | Noted: 2017-01-30

## 2020-12-07 DIAGNOSIS — I10 HYPERTENSION, UNSPECIFIED TYPE: ICD-10-CM

## 2020-12-07 RX ORDER — BISOPROLOL FUMARATE 5 MG/1
TABLET ORAL
Qty: 90 TABLET | Refills: 3 | Status: SHIPPED | OUTPATIENT
Start: 2020-12-07 | End: 2021-11-03 | Stop reason: SDUPTHER

## 2020-12-14 DIAGNOSIS — E78.5 HYPERLIPIDEMIA, UNSPECIFIED HYPERLIPIDEMIA TYPE: ICD-10-CM

## 2020-12-14 RX ORDER — PRAVASTATIN SODIUM 20 MG
20 TABLET ORAL DAILY
Qty: 90 TABLET | Refills: 0 | Status: SHIPPED | OUTPATIENT
Start: 2020-12-14 | End: 2021-03-22

## 2021-01-28 DIAGNOSIS — Z23 ENCOUNTER FOR IMMUNIZATION: ICD-10-CM

## 2021-02-06 ENCOUNTER — IMMUNIZATIONS (OUTPATIENT)
Dept: FAMILY MEDICINE CLINIC | Facility: HOSPITAL | Age: 86
End: 2021-02-06

## 2021-02-06 DIAGNOSIS — Z23 ENCOUNTER FOR IMMUNIZATION: Primary | ICD-10-CM

## 2021-02-06 PROCEDURE — 91301 SARS-COV-2 / COVID-19 MRNA VACCINE (MODERNA) 100 MCG: CPT

## 2021-02-06 PROCEDURE — 0011A SARS-COV-2 / COVID-19 MRNA VACCINE (MODERNA) 100 MCG: CPT

## 2021-02-23 ENCOUNTER — OFFICE VISIT (OUTPATIENT)
Dept: CARDIOLOGY CLINIC | Facility: CLINIC | Age: 86
End: 2021-02-23
Payer: MEDICARE

## 2021-02-23 VITALS
TEMPERATURE: 96.6 F | BODY MASS INDEX: 23.21 KG/M2 | SYSTOLIC BLOOD PRESSURE: 158 MMHG | HEIGHT: 63 IN | RESPIRATION RATE: 16 BRPM | DIASTOLIC BLOOD PRESSURE: 90 MMHG | WEIGHT: 131 LBS | HEART RATE: 92 BPM

## 2021-02-23 DIAGNOSIS — E78.2 MIXED HYPERLIPIDEMIA: ICD-10-CM

## 2021-02-23 DIAGNOSIS — I49.3 PREMATURE VENTRICULAR CONTRACTION: ICD-10-CM

## 2021-02-23 DIAGNOSIS — I48.21 PERMANENT ATRIAL FIBRILLATION (HCC): Primary | ICD-10-CM

## 2021-02-23 DIAGNOSIS — I10 ESSENTIAL HYPERTENSION: ICD-10-CM

## 2021-02-23 DIAGNOSIS — R60.0 LOWER EXTREMITY EDEMA: ICD-10-CM

## 2021-02-23 PROCEDURE — 99214 OFFICE O/P EST MOD 30 MIN: CPT | Performed by: INTERNAL MEDICINE

## 2021-02-23 RX ORDER — FUROSEMIDE 20 MG/1
10 TABLET ORAL 2 TIMES WEEKLY
Qty: 12 TABLET | Refills: 3 | Status: SHIPPED | OUTPATIENT
Start: 2021-02-25 | End: 2022-04-04

## 2021-02-23 RX ORDER — FUROSEMIDE 20 MG/1
10 TABLET ORAL 2 TIMES WEEKLY
Qty: 12 TABLET | Refills: 3 | Status: SHIPPED | OUTPATIENT
Start: 2021-02-25 | End: 2021-02-23 | Stop reason: SDUPTHER

## 2021-02-23 NOTE — PROGRESS NOTES
Cardiology Follow Up    Rosaline Prior  7/9/1930  [address] [facility] [address]  [phone]    Reason for visit: 6 month FU for PAF, HTN, HLP and PVCs      1  Permanent atrial fibrillation ([address] )     2  Essential hypertension     3  Premature ventricular contraction     4  Mixed hyperlipidemia     5  Lower extremity edema         Interval History: The patient has had elevated BPs and has taken bisoprolol 2 5 mg about 3 times per week over the past few weeks (takes it if SBP>150)  She denies palpitations, She denies chest pain  She did have some brief SOB once when her BP was high  She denies edema  She denies dizziness      Patient Active Problem List   Diagnosis    Anemia    Hyperlipidemia    Hypertension    Paroxysmal atrial fibrillation (HCC)    Premature ventricular contraction    Vitamin D deficiency    Esophageal reflux    Ulnar nerve entrapment at elbow    Numbness of hand    Lower extremity edema     Past Medical History:   Diagnosis Date    Atrial fibrillation ([facility] [address] )     Fracture of ankle     Hyperlipidemia     Hypertension     Macular degeneration     Osteoarthritis     Vitamin D deficiency      Social History     Socioeconomic History    Marital status:       Spouse name: Not on file    Number of children: Not on file    Years of education: Not on file    Highest education level: Not on file   Occupational History    Not on file   Social Needs    Financial resource strain: Not on file    Food insecurity     Worry: Not on file     Inability: Not on file    Transportation needs     Medical: Not on file     Non-medical: Not on file   Tobacco Use    Smoking status: Never Smoker    Smokeless tobacco: Never Used   Substance and Sexual Activity    Alcohol use: No     Comment: Social drinker per [employer]    Drug use: No    Sexual activity: Not on file   Lifestyle  Physical activity     Days per week: Not on file     Minutes per session: Not on file    Stress: Not on file   Relationships    Social connections     Talks on phone: Not on file     Gets together: Not on file     Attends Synagogue service: Not on file     Active member of club or organization: Not on file     Attends meetings of clubs or organizations: Not on file     Relationship status: Not on file    Intimate partner violence     Fear of current or ex partner: Not on file     Emotionally abused: Not on file     Physically abused: Not on file     Forced sexual activity: Not on file   Other Topics Concern    Not on file   Social History Narrative    Not on file      Family History   Problem Relation Age of Onset    Supraventricular tachycardia Family      Past Surgical History:   Procedure Laterality Date    APPENDECTOMY      PERINEAL  PELVIC RECONSTRUCTION SURGERY         Current Outpatient Medications:     apixaban (Eliquis) 2 5 mg, Take 1 tablet (2 5 mg total) by mouth 2 (two) times a day, Disp: 180 tablet, Rfl: 3    bisoprolol (ZEBETA) 5 mg tablet, TAKE ONE-HALF (1/2) TABLET DAILY AS NEEDED FOR PALPITATIONS, Disp: 90 tablet, Rfl: 3    digoxin (LANOXIN) 0 125 mg tablet, TAKE 1 TABLET DAILY, Disp: 90 tablet, Rfl: 3    fluticasone-salmeterol (ADVAIR, WIXELA) 250-50 mcg/dose inhaler, USE 1 INHALATION TWO TIMES A DAY, Disp: 60 each, Rfl: 11    Multiple Vitamins-Minerals (PRESERVISION AREDS 2+MULTI VIT) CAPS, Take 1 capsule by mouth 2 (two) times a day , Disp: , Rfl:     omeprazole (PriLOSEC) 40 MG capsule, TAKE 1 CAPSULE DAILY, Disp: 90 capsule, Rfl: 4    pravastatin (PRAVACHOL) 20 mg tablet, Take 1 tablet (20 mg total) by mouth daily, Disp: 90 tablet, Rfl: 0  No Known Allergies      Review of Systems:  Review of Systems   Constitutional: Positive for fatigue  Negative for activity change, appetite change and unexpected weight change  Respiratory: Positive for cough and wheezing   Negative for chest tightness and shortness of breath  Cardiovascular: Negative for chest pain, palpitations and leg swelling  Gastrointestinal: Positive for constipation  Negative for abdominal pain, blood in stool and diarrhea  Genitourinary: Negative for dysuria, frequency, hematuria and urgency  Musculoskeletal: Positive for arthralgias, back pain and gait problem  Negative for joint swelling  Neurological: Negative for dizziness, syncope, speech difficulty, light-headedness and headaches  Psychiatric/Behavioral: Negative for agitation, behavioral problems, confusion and decreased concentration  Physical Exam:  Vitals:    02/23/21 1338   BP: 158/90   Pulse: 92   Resp: 16   Temp: (!) 96 6 °F (35 9 °C)   Weight: 59 4 kg (131 lb)   Height: 5' 3" (1 6 m)       Physical Exam  Constitutional:       Appearance: Normal appearance  She is normal weight  She is not ill-appearing  HENT:      Head: Normocephalic and atraumatic  Mouth/Throat:      Mouth: Mucous membranes are moist       Pharynx: No oropharyngeal exudate or posterior oropharyngeal erythema  Eyes:      General: No scleral icterus  Conjunctiva/sclera: Conjunctivae normal    Neck:      Musculoskeletal: Neck supple  Thyroid: No thyroid mass or thyromegaly  Vascular: Normal carotid pulses  No carotid bruit or JVD  Cardiovascular:      Rate and Rhythm: Rhythm irregular  Pulses:           Dorsalis pedis pulses are 1+ on the right side and 1+ on the left side  Heart sounds: No murmur  No friction rub  No gallop  Pulmonary:      Breath sounds: Normal breath sounds  No wheezing, rhonchi or rales  Abdominal:      Palpations: Abdomen is soft  There is no hepatomegaly, splenomegaly or mass  Tenderness: There is no abdominal tenderness  Musculoskeletal:         General: Swelling (1-2+ LLE, 1+RLE) and deformity (kyphosis) present  Skin:     General: Skin is warm and dry  Coloration: Skin is not jaundiced or pale  Findings: No bruising, erythema, lesion or rash  Neurological:      General: No focal deficit present  Mental Status: She is oriented to person, place, and time  Cranial Nerves: No cranial nerve deficit  Sensory: No sensory deficit  Motor: No weakness  Psychiatric:         Mood and Affect: Mood normal          Behavior: Behavior normal          Thought Content: Thought content normal          Judgment: Judgment normal          Discussion/Summary:  1  Permanent AFib  Rate high normal on digoxin 0 125 mg daily  Suspect perhaps mild fluid overload contributing to this  No need for rate control on a regular basis at this time  2  Hypertension  Patient appears to be having more days when her systolic blood pressure exceeds 150  Is using bisoprolol 2 5 mg on days were systolic blood pressure exceeds 150  Continue with this for now  3  PVCs  Currently not having palpitations  4  Mixed hyperlipidemia  Patient on pravastatin 20 mg daily  LDL cholesterol 102 with HDL cholesterol 76 in October 5  Lower extremity edema  Perhaps somewhat more pronounced  Suspect there may be an element of some mild diastolic heart failure  Did have elevated proBNP in the past   Will add furosemide 10 mg twice weekly      Check digoxin level, BMP and proBNP in April      Steph Mayberry MD

## 2021-03-04 ENCOUNTER — IMMUNIZATIONS (OUTPATIENT)
Dept: FAMILY MEDICINE CLINIC | Facility: HOSPITAL | Age: 86
End: 2021-03-04

## 2021-03-04 DIAGNOSIS — Z23 ENCOUNTER FOR IMMUNIZATION: Primary | ICD-10-CM

## 2021-03-04 PROCEDURE — 0012A SARS-COV-2 / COVID-19 MRNA VACCINE (MODERNA) 100 MCG: CPT

## 2021-03-04 PROCEDURE — 91301 SARS-COV-2 / COVID-19 MRNA VACCINE (MODERNA) 100 MCG: CPT

## 2021-03-22 DIAGNOSIS — E78.5 HYPERLIPIDEMIA, UNSPECIFIED HYPERLIPIDEMIA TYPE: ICD-10-CM

## 2021-03-22 DIAGNOSIS — I48.91 ATRIAL FIBRILLATION, UNSPECIFIED TYPE (HCC): ICD-10-CM

## 2021-03-22 RX ORDER — DIGOXIN 125 MCG
TABLET ORAL
Qty: 90 TABLET | Refills: 3 | Status: SHIPPED | OUTPATIENT
Start: 2021-03-22 | End: 2022-03-09

## 2021-03-22 RX ORDER — PRAVASTATIN SODIUM 20 MG
TABLET ORAL
Qty: 90 TABLET | Refills: 3 | Status: SHIPPED | OUTPATIENT
Start: 2021-03-22 | End: 2022-03-09

## 2021-03-24 ENCOUNTER — TELEPHONE (OUTPATIENT)
Dept: CARDIOLOGY CLINIC | Facility: CLINIC | Age: 86
End: 2021-03-24

## 2021-03-24 NOTE — TELEPHONE ENCOUNTER
[place] states she may be without her pravastain for a few days  Wanted dr Krysta Gonzalez to know   told her that should be okay for a few days  Told her cholesterol med not like missing BP or a blood thinner  Should be delivered in a few days

## 2021-05-11 ENCOUNTER — TELEPHONE (OUTPATIENT)
Dept: CARDIOLOGY CLINIC | Facility: CLINIC | Age: 86
End: 2021-05-11

## 2021-05-11 ENCOUNTER — TELEPHONE (OUTPATIENT)
Dept: LAB | Facility: HOSPITAL | Age: 86
End: 2021-05-11

## 2021-05-11 NOTE — TELEPHONE ENCOUNTER
Tc from patient, questioned if she needs to continue lasix as ordered  See cannot read prescription bottle due to her macular degeneration    questioned if she has refills available for lasix  reviewed chart, last ov 2/23/21  Advised to contniue lasix twice weekly  Reminded patient to have lab draws as ordered by dr Doug Peters  Reminded of upcoming ov with dr Doug Peters, may 25  Patient agreed to continue lasix , she has 3 refills available  She reports she feels lower leg edema, is unchanged

## 2021-05-17 ENCOUNTER — TELEPHONE (OUTPATIENT)
Dept: CARDIOLOGY CLINIC | Facility: CLINIC | Age: 86
End: 2021-05-17

## 2021-05-17 ENCOUNTER — APPOINTMENT (OUTPATIENT)
Dept: LAB | Facility: HOSPITAL | Age: 86
End: 2021-05-17
Attending: INTERNAL MEDICINE
Payer: MEDICARE

## 2021-05-17 DIAGNOSIS — R60.0 LOWER EXTREMITY EDEMA: ICD-10-CM

## 2021-05-17 DIAGNOSIS — I10 ESSENTIAL HYPERTENSION: ICD-10-CM

## 2021-05-17 DIAGNOSIS — I48.21 PERMANENT ATRIAL FIBRILLATION (HCC): ICD-10-CM

## 2021-05-17 LAB
ANION GAP SERPL CALCULATED.3IONS-SCNC: 4 MMOL/L (ref 4–13)
BUN SERPL-MCNC: 18 MG/DL (ref 5–25)
CALCIUM SERPL-MCNC: 9.4 MG/DL (ref 8.3–10.1)
CHLORIDE SERPL-SCNC: 110 MMOL/L (ref 100–108)
CO2 SERPL-SCNC: 27 MMOL/L (ref 21–32)
CREAT SERPL-MCNC: 0.89 MG/DL (ref 0.6–1.3)
DIGOXIN SERPL-MCNC: 0.4 NG/ML (ref 0.8–2)
GFR SERPL CREATININE-BSD FRML MDRD: 57 ML/MIN/1.73SQ M
GLUCOSE P FAST SERPL-MCNC: 130 MG/DL (ref 65–99)
NT-PROBNP SERPL-MCNC: 1714 PG/ML
POTASSIUM SERPL-SCNC: 4.2 MMOL/L (ref 3.5–5.3)
SODIUM SERPL-SCNC: 141 MMOL/L (ref 136–145)

## 2021-05-17 PROCEDURE — 83880 ASSAY OF NATRIURETIC PEPTIDE: CPT

## 2021-05-17 PROCEDURE — 36415 COLL VENOUS BLD VENIPUNCTURE: CPT

## 2021-05-17 PROCEDURE — 80162 ASSAY OF DIGOXIN TOTAL: CPT

## 2021-05-17 PROCEDURE — 80048 BASIC METABOLIC PNL TOTAL CA: CPT

## 2021-05-24 PROBLEM — I50.32 CHRONIC DIASTOLIC (CONGESTIVE) HEART FAILURE (HCC): Status: ACTIVE | Noted: 2021-05-24

## 2021-06-22 ENCOUNTER — OFFICE VISIT (OUTPATIENT)
Dept: CARDIOLOGY CLINIC | Facility: CLINIC | Age: 86
End: 2021-06-22
Payer: MEDICARE

## 2021-06-22 VITALS
HEIGHT: 63 IN | WEIGHT: 129.6 LBS | SYSTOLIC BLOOD PRESSURE: 114 MMHG | HEART RATE: 71 BPM | DIASTOLIC BLOOD PRESSURE: 70 MMHG | BODY MASS INDEX: 22.96 KG/M2

## 2021-06-22 DIAGNOSIS — I10 ESSENTIAL HYPERTENSION: ICD-10-CM

## 2021-06-22 DIAGNOSIS — I48.21 PERMANENT ATRIAL FIBRILLATION (HCC): Primary | ICD-10-CM

## 2021-06-22 DIAGNOSIS — I50.32 CHRONIC DIASTOLIC (CONGESTIVE) HEART FAILURE (HCC): ICD-10-CM

## 2021-06-22 DIAGNOSIS — I49.3 PREMATURE VENTRICULAR CONTRACTION: ICD-10-CM

## 2021-06-22 DIAGNOSIS — E78.2 MIXED HYPERLIPIDEMIA: ICD-10-CM

## 2021-06-22 PROCEDURE — 99214 OFFICE O/P EST MOD 30 MIN: CPT | Performed by: INTERNAL MEDICINE

## 2021-06-22 PROCEDURE — 93000 ELECTROCARDIOGRAM COMPLETE: CPT | Performed by: INTERNAL MEDICINE

## 2021-06-22 NOTE — PROGRESS NOTES
Cardiology Follow Up    [place] Mick  7/9/1930  [address] [place] [address]  [phone]    Reason for visit: 6 month FU for PAF, HTN, HLP and PVCs and chronic diastolic CHF      1  Permanent atrial fibrillation (HCC)  POCT ECG   2  Essential hypertension     3  Premature ventricular contraction     4  Mixed hyperlipidemia     5  Chronic diastolic (congestive) heart failure (HCC)         Interval History:   Since her last visit she has been plagued by declining vision and back pain    She has ongoing edema and is taking lasix 10 mg 2x per week  She denies dyspnea or palpitations or chest pain    She has needed her bisoprolol for BP > 222 systolic about 3 times per week  She denies dizziness  Patient Active Problem List   Diagnosis    Anemia    Hyperlipidemia    Hypertension    Permanent atrial fibrillation (HCC)    Premature ventricular contraction    Vitamin D deficiency    Esophageal reflux    Ulnar nerve entrapment at elbow    Numbness of hand    Lower extremity edema    Chronic diastolic (congestive) heart failure (HCC)     Past Medical History:   Diagnosis Date    Atrial fibrillation (HCC)     Fracture of ankle     Hyperlipidemia     Hypertension     Macular degeneration     Osteoarthritis     Vitamin D deficiency      Social History     Socioeconomic History    Marital status:       Spouse name: Not on file    Number of children: Not on file    Years of education: Not on file    Highest education level: Not on file   Occupational History    Not on file   Tobacco Use    Smoking status: Never Smoker    Smokeless tobacco: Never Used   Substance and Sexual Activity    Alcohol use: No     Comment: Social drinker per [employer]    Drug use: No    Sexual activity: Not on file   Other Topics Concern    Not on file   Social History Narrative    Not on file Social Determinants of Health     Financial Resource Strain:     Difficulty of Paying Living Expenses:    Food Insecurity:     Worried About Running Out of Food in the Last Year:     [address] in the Last Year:    Transportation Needs:     Lack of Transportation (Medical):      Lack of Transportation (Non-Medical):    Physical Activity:     Days of Exercise per Week:     Minutes of Exercise per Session:    Stress:     Feeling of Stress :    Social Connections:     Frequency of Communication with Friends and Family:     Frequency of Social Gatherings with Friends and Family:     Attends Orthodoxy Services:     Active Member of Clubs or Organizations:     Attends Club or Organization Meetings:     Marital Status:    Intimate Partner Violence:     Fear of Current or Ex-Partner:     Emotionally Abused:     Physically Abused:     Sexually Abused:       Family History   Problem Relation Age of Onset    Supraventricular tachycardia Family      Past Surgical History:   Procedure Laterality Date    APPENDECTOMY      PERINEAL  PELVIC RECONSTRUCTION SURGERY         Current Outpatient Medications:     apixaban (Eliquis) 2 5 mg, Take 1 tablet (2 5 mg total) by mouth 2 (two) times a day, Disp: 180 tablet, Rfl: 3    bisoprolol (ZEBETA) 5 mg tablet, TAKE ONE-HALF (1/2) TABLET DAILY AS NEEDED FOR PALPITATIONS, Disp: 90 tablet, Rfl: 3    digoxin (LANOXIN) 0 125 mg tablet, TAKE 1 TABLET DAILY, Disp: 90 tablet, Rfl: 3    fluticasone-salmeterol (ADVAIR, WIXELA) 250-50 mcg/dose inhaler, USE 1 INHALATION TWO TIMES A DAY, Disp: 60 each, Rfl: 11    furosemide (LASIX) 20 mg tablet, Take 0 5 tablets (10 mg total) by mouth 2 (two) times a week, Disp: 12 tablet, Rfl: 3    Multiple Vitamins-Minerals (PRESERVISION AREDS 2+MULTI VIT) CAPS, Take 1 capsule by mouth 2 (two) times a day , Disp: , Rfl:     pravastatin (PRAVACHOL) 20 mg tablet, TAKE 1 TABLET DAILY, Disp: 90 tablet, Rfl: 3    omeprazole (PriLOSEC) 40 MG capsule, TAKE 1 CAPSULE DAILY (Patient not taking: Reported on 6/22/2021), Disp: 90 capsule, Rfl: 4  No Known Allergies    Review of Systems:  Review of Systems   Constitutional: Positive for fatigue  Negative for activity change, appetite change and unexpected weight change  HENT: Positive for hearing loss  Respiratory: Negative for cough, chest tightness, shortness of breath and wheezing  Cardiovascular: Positive for leg swelling  Negative for chest pain and palpitations  Gastrointestinal: Positive for constipation  Negative for abdominal pain, blood in stool and diarrhea  Genitourinary: Positive for frequency  Negative for dysuria, hematuria and urgency  Musculoskeletal: Positive for arthralgias, back pain and gait problem  Negative for joint swelling  Neurological: Negative for dizziness, syncope, speech difficulty, light-headedness and headaches  Psychiatric/Behavioral: Negative for agitation, behavioral problems, confusion and decreased concentration  Physical Exam:  Vitals:    06/22/21 1439   BP: 114/70   Pulse: 71   Weight: 58 8 kg (129 lb 9 6 oz)   Height: 5' 3" (1 6 m)       Physical Exam  Constitutional:       General: She is not in acute distress  Appearance: She is normal weight  She is not ill-appearing  HENT:      Head: Normocephalic and atraumatic  Mouth/Throat:      Mouth: Mucous membranes are moist       Pharynx: No oropharyngeal exudate or posterior oropharyngeal erythema  Eyes:      General: No scleral icterus  Conjunctiva/sclera: Conjunctivae normal    Neck:      Thyroid: No thyroid mass or thyromegaly  Vascular: Normal carotid pulses  No carotid bruit or JVD  Cardiovascular:      Rate and Rhythm: Normal rate  Rhythm irregular  Pulses:           Dorsalis pedis pulses are 1+ on the right side and 1+ on the left side  Heart sounds: No murmur heard  No friction rub  No gallop  Pulmonary:      Breath sounds: Normal breath sounds   No wheezing, rhonchi or rales  Abdominal:      Palpations: Abdomen is soft  There is no hepatomegaly, splenomegaly or mass  Tenderness: There is no abdominal tenderness  Musculoskeletal:         General: Swelling (1-2+ LEs L>RLE) and deformity (kyphosis) present  Cervical back: Neck supple  Skin:     General: Skin is warm  Coloration: Skin is not jaundiced or pale  Findings: No bruising, erythema, lesion or rash  Neurological:      Mental Status: She is oriented to person, place, and time  Cranial Nerves: No cranial nerve deficit  Sensory: No sensory deficit  Motor: No weakness  Psychiatric:         Mood and Affect: Mood normal          Behavior: Behavior normal          Thought Content: Thought content normal          Judgment: Judgment normal          Discussion/Summary:  1  Permanent AFib  Rate well controlled on digoxin 0 125 mg daily  Patient on Eliquis 2 5 mg b i d  For stroke prophylaxis  Continue same  Dig level fine recently  2  Hypertension  Patient takes bisoprolol 2 5 mg p r n  Systolic blood pressure greater than 150 about half the days  Blood pressure was fine today  She had taken it this morning  3  PVCs  No symptoms at this time  4  Mixed hyperlipidemia  Patient on pravastatin 20 mg daily  Most recent LDL cholesterol 102 with HDL cholesterol 76 in October of last year  5  Lower extremity edema with suspected diastolic congestive heart failure  Appears to be compensated on furosemide 10 mg twice weekly        FU 6 months      Elio Ace MD

## 2021-06-24 ENCOUNTER — HOSPITAL ENCOUNTER (EMERGENCY)
Facility: HOSPITAL | Age: 86
Discharge: HOME | End: 2021-06-24
Attending: EMERGENCY MEDICINE | Admitting: EMERGENCY MEDICINE
Payer: MEDICARE

## 2021-06-24 ENCOUNTER — TELEPHONE (OUTPATIENT)
Dept: INTERNAL MEDICINE CLINIC | Facility: CLINIC | Age: 86
End: 2021-06-24

## 2021-06-24 ENCOUNTER — APPOINTMENT (EMERGENCY)
Dept: RADIOLOGY | Facility: HOSPITAL | Age: 86
End: 2021-06-24
Payer: MEDICARE

## 2021-06-24 ENCOUNTER — APPOINTMENT (EMERGENCY)
Dept: CT IMAGING | Facility: HOSPITAL | Age: 86
End: 2021-06-24
Payer: MEDICARE

## 2021-06-24 VITALS
RESPIRATION RATE: 18 BRPM | DIASTOLIC BLOOD PRESSURE: 88 MMHG | SYSTOLIC BLOOD PRESSURE: 178 MMHG | OXYGEN SATURATION: 97 % | HEART RATE: 70 BPM | TEMPERATURE: 98 F | BODY MASS INDEX: 22.77 KG/M2 | WEIGHT: 128.53 LBS

## 2021-06-24 DIAGNOSIS — S09.90XA INJURY OF HEAD, INITIAL ENCOUNTER: ICD-10-CM
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
## 2021-06-24 LAB
ALBUMIN SERPL BCP-MCNC: 3.6 G/DL (ref 3.5–5)
ALP SERPL-CCNC: 169 U/L (ref 46–116)
ALT SERPL W P-5'-P-CCNC: 29 U/L (ref 12–78)
ANION GAP SERPL CALCULATED.3IONS-SCNC: 8 MMOL/L (ref 4–13)
APTT PPP: 37 SECONDS (ref 23–37)
AST SERPL W P-5'-P-CCNC: 31 U/L (ref 5–45)
ATRIAL RATE: 277 BPM
BACTERIA UR QL AUTO: ABNORMAL /HPF
BASOPHILS # BLD AUTO: 0.06 THOUSANDS/ΜL (ref 0–0.1)
BASOPHILS NFR BLD AUTO: 1 % (ref 0–1)
BILIRUB SERPL-MCNC: 0.57 MG/DL (ref 0.2–1)
BILIRUB UR QL STRIP: NEGATIVE
BUN SERPL-MCNC: 18 MG/DL (ref 5–25)
CALCIUM SERPL-MCNC: 8.7 MG/DL (ref 8.3–10.1)
CHLORIDE SERPL-SCNC: 107 MMOL/L (ref 100–108)
CLARITY UR: CLEAR
CO2 SERPL-SCNC: 28 MMOL/L (ref 21–32)
COLOR UR: YELLOW
CREAT SERPL-MCNC: 0.76 MG/DL (ref 0.6–1.3)
EOSINOPHIL # BLD AUTO: 0.13 THOUSAND/ΜL (ref 0–0.61)
EOSINOPHIL NFR BLD AUTO: 2 % (ref 0–6)
ERYTHROCYTE [DISTWIDTH] IN BLOOD BY AUTOMATED COUNT: 14.3 % (ref 11.6–15.1)
GFR SERPL CREATININE-BSD FRML MDRD: 69 ML/MIN/1.73SQ M
GLUCOSE SERPL-MCNC: 80 MG/DL (ref 65–140)
GLUCOSE UR STRIP-MCNC: NEGATIVE MG/DL
HCT VFR BLD AUTO: 41.8 % (ref 34.8–46.1)
HGB BLD-MCNC: 13.2 G/DL (ref 11.5–15.4)
HGB UR QL STRIP.AUTO: ABNORMAL
IMM GRANULOCYTES # BLD AUTO: 0.04 THOUSAND/UL (ref 0–0.2)
IMM GRANULOCYTES NFR BLD AUTO: 1 % (ref 0–2)
INR PPP: 1.19 (ref 0.84–1.19)
KETONES UR STRIP-MCNC: NEGATIVE MG/DL
LEUKOCYTE ESTERASE UR QL STRIP: NEGATIVE
LYMPHOCYTES # BLD AUTO: 1.1 THOUSANDS/ΜL (ref 0.6–4.47)
LYMPHOCYTES NFR BLD AUTO: 13 % (ref 14–44)
MCH RBC QN AUTO: 30.1 PG (ref 26.8–34.3)
MCHC RBC AUTO-ENTMCNC: 31.6 G/DL (ref 31.4–37.4)
MCV RBC AUTO: 95 FL (ref 82–98)
MONOCYTES # BLD AUTO: 0.69 THOUSAND/ΜL (ref 0.17–1.22)
MONOCYTES NFR BLD AUTO: 8 % (ref 4–12)
NEUTROPHILS # BLD AUTO: 6.19 THOUSANDS/ΜL (ref 1.85–7.62)
NEUTS SEG NFR BLD AUTO: 75 % (ref 43–75)
NITRITE UR QL STRIP: NEGATIVE
NON-SQ EPI CELLS URNS QL MICRO: ABNORMAL /HPF
NRBC BLD AUTO-RTO: 0 /100 WBCS
PH UR STRIP.AUTO: 6.5 [PH] (ref 4.5–8)
PLATELET # BLD AUTO: 198 THOUSANDS/UL (ref 149–390)
PMV BLD AUTO: 10.6 FL (ref 8.9–12.7)
POTASSIUM SERPL-SCNC: 4.5 MMOL/L (ref 3.5–5.3)
PROT SERPL-MCNC: 7.1 G/DL (ref 6.4–8.2)
PROT UR STRIP-MCNC: NEGATIVE MG/DL
PROTHROMBIN TIME: 14.9 SECONDS (ref 11.6–14.5)
QRS AXIS: 6 DEGREES
QRSD INTERVAL: 74 MS
QT INTERVAL: 384 MS
QTC INTERVAL: 396 MS
RBC # BLD AUTO: 4.39 MILLION/UL (ref 3.81–5.12)
RBC #/AREA URNS AUTO: ABNORMAL /HPF
SODIUM SERPL-SCNC: 143 MMOL/L (ref 136–145)
SP GR UR STRIP.AUTO: 1.02 (ref 1–1.03)
T WAVE AXIS: 44 DEGREES
TROPONIN I SERPL-MCNC: <0.02 NG/ML
TSH SERPL DL<=0.05 MIU/L-ACNC: 2.5 UIU/ML (ref 0.36–3.74)
UROBILINOGEN UR QL STRIP.AUTO: 0.2 E.U./DL
VENTRICULAR RATE: 64 BPM
WBC # BLD AUTO: 8.21 THOUSAND/UL (ref 4.31–10.16)
WBC #/AREA URNS AUTO: ABNORMAL /HPF

## 2021-06-24 PROCEDURE — 85610 PROTHROMBIN TIME: CPT | Performed by: EMERGENCY MEDICINE

## 2021-06-24 PROCEDURE — 93010 ELECTROCARDIOGRAM REPORT: CPT

## 2021-06-24 PROCEDURE — 71045 X-RAY EXAM CHEST 1 VIEW: CPT

## 2021-06-24 PROCEDURE — 70450 CT HEAD/BRAIN W/O DYE: CPT

## 2021-06-24 PROCEDURE — 85025 COMPLETE CBC W/AUTO DIFF WBC: CPT | Performed by: EMERGENCY MEDICINE

## 2021-06-24 PROCEDURE — 80053 COMPREHEN METABOLIC PANEL: CPT | Performed by: EMERGENCY MEDICINE

## 2021-06-24 PROCEDURE — 93005 ELECTROCARDIOGRAM TRACING: CPT

## 2021-06-24 PROCEDURE — 85730 THROMBOPLASTIN TIME PARTIAL: CPT | Performed by: EMERGENCY MEDICINE

## 2021-06-24 PROCEDURE — 99284 EMERGENCY DEPT VISIT MOD MDM: CPT

## 2021-06-24 PROCEDURE — 99285 EMERGENCY DEPT VISIT HI MDM: CPT | Performed by: EMERGENCY MEDICINE

## 2021-06-24 PROCEDURE — 81001 URINALYSIS AUTO W/SCOPE: CPT

## 2021-06-24 PROCEDURE — G1004 CDSM NDSC: HCPCS

## 2021-06-24 PROCEDURE — 84443 ASSAY THYROID STIM HORMONE: CPT | Performed by: EMERGENCY MEDICINE

## 2021-06-24 PROCEDURE — 84484 ASSAY OF TROPONIN QUANT: CPT | Performed by: EMERGENCY MEDICINE

## 2021-06-24 PROCEDURE — 72170 X-RAY EXAM OF PELVIS: CPT

## 2021-06-24 PROCEDURE — 36415 COLL VENOUS BLD VENIPUNCTURE: CPT | Performed by: EMERGENCY MEDICINE

## 2021-06-24 RX ORDER — ACETAMINOPHEN 325 MG/1
975 TABLET ORAL ONCE
Status: DISCONTINUED | OUTPATIENT
Start: 2021-06-24 | End: 2021-06-24 | Stop reason: HOSPADM

## 2021-06-24 RX ADMIN — SODIUM CHLORIDE 500 ML: 0.9 INJECTION, SOLUTION INTRAVENOUS at 12:35

## 2021-06-24 NOTE — ED PROVIDER NOTES
History  Chief Complaint   Patient presents with    Fall     Pt has hx of falling; lost balance 24 hours ago in her garage while sorting vegetables  Hit posterior back and head  Pt is on thinners  Pt denies LOC, nausea or dizziness  27-year-old female who lives alone was standing in the garage and lost her balance and fell backwards striking her head  She is on Eliquis  No LOC, no headache, no nausea/vomiting  No chest pain, no shortness of breath          Prior to Admission Medications   Prescriptions Last Dose Informant Patient Reported? Taking?    Multiple Vitamins-Minerals (PRESERVISION AREDS 2+MULTI VIT) CAPS  Self Yes Yes   Sig: Take 1 capsule by mouth 2 (two) times a day    apixaban (Eliquis) 2 5 mg  Self No Yes   Sig: Take 1 tablet (2 5 mg total) by mouth 2 (two) times a day   bisoprolol (ZEBETA) 5 mg tablet  Self No Yes   Sig: TAKE ONE-HALF (1/2) TABLET DAILY AS NEEDED FOR PALPITATIONS   digoxin (LANOXIN) 0 125 mg tablet  Self No Yes   Sig: TAKE 1 TABLET DAILY   fluticasone-salmeterol (ADVAIR, WIXELA) 250-50 mcg/dose inhaler  Self No Yes   Sig: USE 1 INHALATION TWO TIMES A DAY   furosemide (LASIX) 20 mg tablet  Self No Yes   Sig: Take 0 5 tablets (10 mg total) by mouth 2 (two) times a week   omeprazole (PriLOSEC) 40 MG capsule  Self No Yes   Sig: TAKE 1 CAPSULE DAILY   Patient taking differently: as needed    pravastatin (PRAVACHOL) 20 mg tablet  Self No Yes   Sig: TAKE 1 TABLET DAILY      Facility-Administered Medications: None       Past Medical History:   Diagnosis Date    Atrial fibrillation (HCC)     Fracture of ankle     Hyperlipidemia     Hypertension     Macular degeneration     Osteoarthritis     Vitamin D deficiency        Past Surgical History:   Procedure Laterality Date    APPENDECTOMY      PERINEAL  PELVIC RECONSTRUCTION SURGERY         Family History   Problem Relation Age of Onset    Supraventricular tachycardia Family      I have reviewed and agree with the history as documented  E-Cigarette/Vaping     E-Cigarette/Vaping Substances     Social History     Tobacco Use    Smoking status: Never Smoker    Smokeless tobacco: Never Used   Substance Use Topics    Alcohol use: No     Comment: Social drinker per [employer]    Drug use: No       Review of Systems   Constitutional: Negative for appetite change, fatigue and fever  HENT: Negative for rhinorrhea and sore throat  Respiratory: Negative for cough, shortness of breath and wheezing  Cardiovascular: Negative for chest pain and leg swelling  Gastrointestinal: Negative for abdominal pain, diarrhea and vomiting  Genitourinary: Negative for dysuria and flank pain  Musculoskeletal: Negative for back pain and neck pain  Skin: Negative for rash  Neurological: Negative for syncope and headaches  Psychiatric/Behavioral:        Mood normal       Physical Exam  Physical Exam  Vitals and nursing note reviewed  Constitutional:       Appearance: She is well-developed  HENT:      Head: Normocephalic and atraumatic  Eyes:      Pupils: Pupils are equal, round, and reactive to light  Neck:      Comments: No C-spine tenderness  Cardiovascular:      Rate and Rhythm: Normal rate and regular rhythm  Pulmonary:      Effort: Pulmonary effort is normal  No respiratory distress  Breath sounds: Normal breath sounds  Abdominal:      Palpations: Abdomen is soft  Tenderness: There is no abdominal tenderness  Musculoskeletal:         General: Normal range of motion  Cervical back: Normal range of motion and neck supple  Comments: All extremities nontender, full range of motion    No spinal tenderness   Skin:     General: Skin is warm and dry  Neurological:      Mental Status: She is alert and oriented to person, place, and time           Vital Signs  ED Triage Vitals [06/24/21 1151]   Temperature Pulse Respirations Blood Pressure SpO2   98 °F (36 7 °C) 74 18 (!) 202/109 96 %      Temp Source Heart Rate Source Patient Position - Orthostatic VS BP Location FiO2 (%)   Oral Monitor Sitting Right arm --      Pain Score       2           Vitals:    06/24/21 1151 06/24/21 1159 06/24/21 1400 06/24/21 1622   BP: (!) 202/109 (!) 183/104 169/77 (!) 178/88   Pulse: 74  72 70   Patient Position - Orthostatic VS: Sitting  Lying Lying         Visual Acuity  Visual Acuity      Most Recent Value   L Pupil Size (mm)  3   R Pupil Size (mm)  3          ED Medications  Medications   acetaminophen (TYLENOL) tablet 975 mg (975 mg Oral Not Given 6/24/21 1621)   sodium chloride 0 9 % bolus 500 mL (0 mL Intravenous Stopped 6/24/21 1301)       Diagnostic Studies  Results Reviewed     Procedure Component Value Units Date/Time    Urine Microscopic [[id number]]  (Abnormal) Collected: 06/24/21 1447    Lab Status: Final result Specimen: Urine, Clean Catch Updated: 06/24/21 1554     RBC, UA 2-4 /hpf      WBC, UA 0-1 /hpf      Epithelial Cells Occasional /hpf      Bacteria, UA Occasional /hpf     Urine Macroscopic, POC [[id number]]  (Abnormal) Collected: 06/24/21 1447    Lab Status: Final result Specimen: Urine Updated: 06/24/21 1448     Color, UA Yellow     Clarity, UA Clear     pH, UA 6 5     Leukocytes, UA Negative     Nitrite, UA Negative     Protein, UA Negative mg/dl      Glucose, UA Negative mg/dl      Ketones, UA Negative mg/dl      Urobilinogen, UA 0 2 E U /dl      Bilirubin, UA Negative     Blood, UA Trace     Specific [place], UA 1 020    Narrative:      CLINITEK RESULT    TSH [[id number]]  (Normal) Collected: 06/24/21 1234    Lab Status: Final result Specimen: Blood from Arm, Left Updated: 06/24/21 1309     TSH 3RD GENERATON 2 497 uIU/mL     Narrative:      Patients undergoing fluorescein dye angiography may retain small amounts of fluorescein in the body for 48-72 hours post procedure  Samples containing fluorescein can produce falsely depressed TSH values   If the patient had this procedure,a specimen should be resubmitted post fluorescein clearance        Troponin I [[id number]]  (Normal) Collected: 06/24/21 1234    Lab Status: Final result Specimen: Blood from Arm, Left Updated: 06/24/21 1305     Troponin I <0 02 ng/mL     Comprehensive metabolic panel [[id number]]  (Abnormal) Collected: 06/24/21 1234    Lab Status: Final result Specimen: Blood from Arm, Left Updated: 06/24/21 1304     Sodium 143 mmol/L      Potassium 4 5 mmol/L      Chloride 107 mmol/L      CO2 28 mmol/L      ANION GAP 8 mmol/L      BUN 18 mg/dL      Creatinine 0 76 mg/dL      Glucose 80 mg/dL      Calcium 8 7 mg/dL      AST 31 U/L      ALT 29 U/L      Alkaline Phosphatase 169 U/L      Total Protein 7 1 g/dL      Albumin 3 6 g/dL      Total Bilirubin 0 57 mg/dL      eGFR 69 ml/min/1 73sq m     Narrative:      [facility] guidelines for Chronic Kidney Disease (CKD):     Stage 1 with normal or high GFR (GFR > 90 mL/min/1 73 square meters)    Stage 2 Mild CKD (GFR = 60-89 mL/min/1 73 square meters)    Stage 3A Moderate CKD (GFR = 45-59 mL/min/1 73 square meters)    Stage 3B Moderate CKD (GFR = 30-44 mL/min/1 73 square meters)    Stage 4 Severe CKD (GFR = 15-29 mL/min/1 73 square meters)    Stage 5 End Stage CKD (GFR <15 mL/min/1 73 square meters)  Note: GFR calculation is accurate only with a steady state creatinine    Protime-INR [[id number]]  (Abnormal) Collected: 06/24/21 1234    Lab Status: Final result Specimen: Blood from Arm, Left Updated: 06/24/21 1302     Protime 14 9 seconds      INR 1 19    APTT [[id number]]  (Normal) Collected: 06/24/21 1234    Lab Status: Final result Specimen: Blood from Arm, Left Updated: 06/24/21 1302     PTT 37 seconds     CBC and differential [[id number]]  (Abnormal) Collected: 06/24/21 1234    Lab Status: Final result Specimen: Blood from Arm, Left Updated: 06/24/21 1239     WBC 8 21 Thousand/uL      RBC 4 39 Million/uL      Hemoglobin 13 2 g/dL      Hematocrit 41 8 %      MCV 95 fL      MCH 30 1 pg      MCHC 31 6 g/dL      RDW 14 3 %      MPV 10 6 fL      Platelets 879 Thousands/uL      nRBC 0 /100 WBCs      Neutrophils Relative 75 %      Immat GRANS % 1 %      Lymphocytes Relative 13 %      Monocytes Relative 8 %      Eosinophils Relative 2 %      Basophils Relative 1 %      Neutrophils Absolute 6 19 Thousands/µL      Immature Grans Absolute 0 04 Thousand/uL      Lymphocytes Absolute 1 10 Thousands/µL      Monocytes Absolute 0 69 Thousand/µL      Eosinophils Absolute 0 13 Thousand/µL      Basophils Absolute 0 06 Thousands/µL                  CT head without contrast   Final Result by Ta Gallardo MD (06/24 1526)      No acute intracranial abnormality  Mild microangiopathic change; this appearance is similar when compared to most recent prior examination  Chronic appearing lacunar infarct at the right caudate head, does appear new from prior imaging  Workstation performed: [id number]         XR chest 1 view portable   Final Result by Sarah Hylton DO (06/24 1602)      No acute cardiopulmonary disease  Mild cardiac enlargement, increasing in size since 2017  Workstation performed: [id number]         XR pelvis ap only 1 or 2 views   Final Result by Sarah Hylton DO (06/24 1616)      No acute osseous abnormality  Lower lumbar degenerative osteoarthritis  Workstation performed: [id number]                    Procedures  Procedures         ED Course                             SBIRT 20yo+      Most Recent Value   SBIRT (22 yo +)   In order to provide better care to our patients, we are screening all of our patients for alcohol and drug use  Would it be okay to ask you these screening questions?   No Filed at: 06/24/2021 1622                    MDM  Number of Diagnoses or Management Options     Amount and/or Complexity of Data Reviewed  Clinical lab tests: ordered and reviewed  Tests in the radiology section of CPT®: ordered and reviewed    Risk of Complications, Morbidity, and/or Mortality  Presenting problems: moderate  General comments: Patient was offered admission but refused  She ambulated with some assistance  She understands to use her walker all the time at home        Disposition  Final diagnoses:   Fall, initial encounter   Injury of head, initial encounter     Time reflects when diagnosis was documented in both MDM as applicable and the Disposition within this note     Time User Action Codes Description Comment    6/24/2021  4:04 PM Roshan Soria Add [A00  Sheral Ding Fall, initial encounter     6/24/2021  4:04 PM Homero Olivares Add [S09 [id number]] Injury of head, initial encounter       ED Disposition     ED Disposition Condition Date/Time Comment    Discharge Stable Thu Jun 24, 2021  4:04 PM Mable Raya discharge to home/self care              Follow-up Information     Follow up With Specialties Details Why [address] Mariaa Flowers MD Internal Medicine   [address]  [address] [address]  [facility] [address]  [phone]            Discharge Medication List as of 6/24/2021  4:05 PM      CONTINUE these medications which have NOT CHANGED    Details   apixaban (Eliquis) 2 5 mg Take 1 tablet (2 5 mg total) by mouth 2 (two) times a day, Starting Tue 5/19/2020, Normal      bisoprolol (ZEBETA) 5 mg tablet TAKE ONE-HALF (1/2) TABLET DAILY AS NEEDED FOR PALPITATIONS, Normal      digoxin (LANOXIN) 0 125 mg tablet TAKE 1 TABLET DAILY, Normal      fluticasone-salmeterol (ADVAIR, WIXELA) 250-50 mcg/dose inhaler USE 1 INHALATION TWO TIMES A DAY, Normal      furosemide (LASIX) 20 mg tablet Take 0 5 tablets (10 mg total) by mouth 2 (two) times a week, Starting u 2/25/2021, Normal      Multiple Vitamins-Minerals (PRESERVISION AREDS 2+MULTI VIT) CAPS Take 1 capsule by mouth 2 (two) times a day , Historical Med      omeprazole (PriLOSEC) 40 MG capsule TAKE 1 CAPSULE DAILY, Normal      pravastatin (PRAVACHOL) 20 mg tablet TAKE 1 TABLET DAILY, Normal           No discharge procedures on file      PDMP Review     None          ED Provider  Electronically Signed by           Andrew Gaxiola MD  06/24/21 2605

## 2021-06-24 NOTE — TELEPHONE ENCOUNTER
Pt called this morning to let her pcp know she had a fall last evenig where she struck her neck and head  Pt feel fine did not have any LOC, c/o generalized soreness  In reviewing the chart and health history, pt has been advised to go to ED for full work up  Pt is on eliquis daily and does live alone

## 2021-07-08 ENCOUNTER — TELEPHONE (OUTPATIENT)
Dept: CARDIOLOGY CLINIC | Facility: CLINIC | Age: 86
End: 2021-07-08

## 2021-07-08 NOTE — TELEPHONE ENCOUNTER
Long lengthy phone call today from [address] [place] [address] about her fall recently that is being followed by her PCP  The reason for her call to Dr Rozina Gomez is that she just noticed how much bruising she has on both legs from the fall  On one leg it is from her thigh to her knee, and the other leg is from thigh to ankle  She called her grandson, who is a cardiac surgeon and he is wondering why she is on Eliquis, when she is obviously a fall risk, at age 80  "He is studying at the [address] [place] [address],[address]"  Please call her about being on Eliquis  She states she is active and walks her dog, so this could happen again  She fell in the garage   [phone]

## 2021-07-08 NOTE — TELEPHONE ENCOUNTER
Called pt    no head trauma    high risk of CVA off of eliquis    continue for now with close surveillance    will message Dr Javier Aceves to see if she would be candidate for watchman procedure

## 2021-10-07 ENCOUNTER — TELEPHONE (OUTPATIENT)
Dept: INTERNAL MEDICINE CLINIC | Facility: CLINIC | Age: 86
End: 2021-10-07

## 2021-10-12 ENCOUNTER — IMMUNIZATIONS (OUTPATIENT)
Dept: INTERNAL MEDICINE CLINIC | Facility: CLINIC | Age: 86
End: 2021-10-12
Payer: MEDICARE

## 2021-10-12 DIAGNOSIS — Z23 FLU VACCINE NEED: Primary | ICD-10-CM

## 2021-10-12 PROCEDURE — 90662 IIV NO PRSV INCREASED AG IM: CPT

## 2021-10-12 PROCEDURE — G0008 ADMIN INFLUENZA VIRUS VAC: HCPCS

## 2021-11-03 ENCOUNTER — TELEPHONE (OUTPATIENT)
Dept: CARDIOLOGY CLINIC | Facility: CLINIC | Age: 86
End: 2021-11-03

## 2021-11-03 DIAGNOSIS — I10 ESSENTIAL HYPERTENSION: Primary | ICD-10-CM

## 2021-11-03 DIAGNOSIS — I10 HYPERTENSION, UNSPECIFIED TYPE: ICD-10-CM

## 2021-11-03 RX ORDER — BISOPROLOL FUMARATE 5 MG/1
2.5 TABLET ORAL SEE ADMIN INSTRUCTIONS
Qty: 90 TABLET | Refills: 3 | Status: SHIPPED | OUTPATIENT
Start: 2021-11-03

## 2021-11-23 ENCOUNTER — APPOINTMENT (EMERGENCY)
Dept: CT IMAGING | Facility: HOSPITAL | Age: 86
End: 2021-11-23
Payer: MEDICARE

## 2021-11-23 ENCOUNTER — HOSPITAL ENCOUNTER (EMERGENCY)
Facility: HOSPITAL | Age: 86
Discharge: HOME | End: 2021-11-23
Attending: EMERGENCY MEDICINE | Admitting: EMERGENCY MEDICINE
Payer: MEDICARE

## 2021-11-23 VITALS
TEMPERATURE: 99.8 F | WEIGHT: 126.1 LBS | DIASTOLIC BLOOD PRESSURE: 76 MMHG | BODY MASS INDEX: 22.34 KG/M2 | RESPIRATION RATE: 18 BRPM | OXYGEN SATURATION: 95 % | HEART RATE: 92 BPM | SYSTOLIC BLOOD PRESSURE: 148 MMHG

## 2021-11-23 DIAGNOSIS — R11.0 NAUSEA: ICD-10-CM

## 2021-11-23 DIAGNOSIS — S30.1XXA ABDOMINAL WALL CONTUSION: Primary | ICD-10-CM
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
## 2021-11-23 LAB
ANION GAP SERPL CALCULATED.3IONS-SCNC: 10 MMOL/L (ref 4–13)
BASOPHILS # BLD AUTO: 0.03 THOUSANDS/ΜL (ref 0–0.1)
BASOPHILS NFR BLD AUTO: 0 % (ref 0–1)
BUN SERPL-MCNC: 18 MG/DL (ref 5–25)
CALCIUM SERPL-MCNC: 7.9 MG/DL (ref 8.3–10.1)
CHLORIDE SERPL-SCNC: 101 MMOL/L (ref 100–108)
CO2 SERPL-SCNC: 25 MMOL/L (ref 21–32)
CREAT SERPL-MCNC: 0.71 MG/DL (ref 0.6–1.3)
EOSINOPHIL # BLD AUTO: 0.03 THOUSAND/ΜL (ref 0–0.61)
EOSINOPHIL NFR BLD AUTO: 0 % (ref 0–6)
ERYTHROCYTE [DISTWIDTH] IN BLOOD BY AUTOMATED COUNT: 14.7 % (ref 11.6–15.1)
GFR SERPL CREATININE-BSD FRML MDRD: 75 ML/MIN/1.73SQ M
GLUCOSE SERPL-MCNC: 133 MG/DL (ref 65–140)
HCT VFR BLD AUTO: 37.7 % (ref 34.8–46.1)
HGB BLD-MCNC: 11.9 G/DL (ref 11.5–15.4)
IMM GRANULOCYTES # BLD AUTO: 0.03 THOUSAND/UL (ref 0–0.2)
IMM GRANULOCYTES NFR BLD AUTO: 0 % (ref 0–2)
LYMPHOCYTES # BLD AUTO: 0.42 THOUSANDS/ΜL (ref 0.6–4.47)
LYMPHOCYTES NFR BLD AUTO: 5 % (ref 14–44)
MCH RBC QN AUTO: 29.7 PG (ref 26.8–34.3)
MCHC RBC AUTO-ENTMCNC: 31.6 G/DL (ref 31.4–37.4)
MCV RBC AUTO: 94 FL (ref 82–98)
MONOCYTES # BLD AUTO: 0.5 THOUSAND/ΜL (ref 0.17–1.22)
MONOCYTES NFR BLD AUTO: 6 % (ref 4–12)
NEUTROPHILS # BLD AUTO: 6.82 THOUSANDS/ΜL (ref 1.85–7.62)
NEUTS SEG NFR BLD AUTO: 89 % (ref 43–75)
NRBC BLD AUTO-RTO: 0 /100 WBCS
PLATELET # BLD AUTO: 159 THOUSANDS/UL (ref 149–390)
PMV BLD AUTO: 11.5 FL (ref 8.9–12.7)
POTASSIUM SERPL-SCNC: 4.2 MMOL/L (ref 3.5–5.3)
RBC # BLD AUTO: 4.01 MILLION/UL (ref 3.81–5.12)
SODIUM SERPL-SCNC: 136 MMOL/L (ref 136–145)
WBC # BLD AUTO: 7.83 THOUSAND/UL (ref 4.31–10.16)

## 2021-11-23 PROCEDURE — 99285 EMERGENCY DEPT VISIT HI MDM: CPT | Performed by: EMERGENCY MEDICINE

## 2021-11-23 PROCEDURE — 74177 CT ABD & PELVIS W/CONTRAST: CPT

## 2021-11-23 PROCEDURE — 99284 EMERGENCY DEPT VISIT MOD MDM: CPT

## 2021-11-23 PROCEDURE — 85025 COMPLETE CBC W/AUTO DIFF WBC: CPT | Performed by: EMERGENCY MEDICINE

## 2021-11-23 PROCEDURE — 71260 CT THORAX DX C+: CPT

## 2021-11-23 PROCEDURE — 96374 THER/PROPH/DIAG INJ IV PUSH: CPT

## 2021-11-23 PROCEDURE — 80048 BASIC METABOLIC PNL TOTAL CA: CPT | Performed by: EMERGENCY MEDICINE

## 2021-11-23 PROCEDURE — 36415 COLL VENOUS BLD VENIPUNCTURE: CPT | Performed by: EMERGENCY MEDICINE

## 2021-11-23 PROCEDURE — 96376 TX/PRO/DX INJ SAME DRUG ADON: CPT

## 2021-11-23 PROCEDURE — 96361 HYDRATE IV INFUSION ADD-ON: CPT

## 2021-11-23 RX ORDER — ONDANSETRON 2 MG/ML
4 INJECTION INTRAMUSCULAR; INTRAVENOUS ONCE
Status: COMPLETED | OUTPATIENT
Start: 2021-11-23 | End: 2021-11-23

## 2021-11-23 RX ORDER — ONDANSETRON 2 MG/ML
1 INJECTION INTRAMUSCULAR; INTRAVENOUS ONCE
Status: COMPLETED | OUTPATIENT
Start: 2021-11-23 | End: 2021-11-23

## 2021-11-23 RX ORDER — ACETAMINOPHEN 160 MG/5ML
1 SUSPENSION, ORAL (FINAL DOSE FORM) ORAL ONCE
Status: COMPLETED | OUTPATIENT
Start: 2021-11-23 | End: 2021-11-23

## 2021-11-23 RX ORDER — SODIUM CHLORIDE 9 MG/ML
125 INJECTION, SOLUTION INTRAVENOUS CONTINUOUS
Status: DISCONTINUED | OUTPATIENT
Start: 2021-11-23 | End: 2021-11-24 | Stop reason: HOSPADM

## 2021-11-23 RX ADMIN — ONDANSETRON 4 MG: 2 INJECTION INTRAMUSCULAR; INTRAVENOUS at 21:54

## 2021-11-23 RX ADMIN — SODIUM CHLORIDE 125 ML/HR: 0.9 INJECTION, SOLUTION INTRAVENOUS at 19:36

## 2021-11-23 RX ADMIN — ONDANSETRON 4 MG: 2 INJECTION INTRAMUSCULAR; INTRAVENOUS at 19:38

## 2021-11-23 RX ADMIN — IOHEXOL 100 ML: 350 INJECTION, SOLUTION INTRAVENOUS at 20:07

## 2021-12-26 ENCOUNTER — HOSPITAL ENCOUNTER (EMERGENCY)
Facility: HOSPITAL | Age: 86
Discharge: HOME | End: 2021-12-26
Attending: EMERGENCY MEDICINE | Admitting: EMERGENCY MEDICINE
Payer: MEDICARE

## 2021-12-26 ENCOUNTER — APPOINTMENT (EMERGENCY)
Dept: RADIOLOGY | Facility: HOSPITAL | Age: 86
End: 2021-12-26
Payer: MEDICARE

## 2021-12-26 VITALS
DIASTOLIC BLOOD PRESSURE: 86 MMHG | RESPIRATION RATE: 18 BRPM | WEIGHT: 138.67 LBS | TEMPERATURE: 98.7 F | SYSTOLIC BLOOD PRESSURE: 189 MMHG | BODY MASS INDEX: 24.56 KG/M2 | OXYGEN SATURATION: 96 % | HEART RATE: 83 BPM

## 2021-12-26 DIAGNOSIS — R00.2 PALPITATIONS: Primary | ICD-10-CM

## 2021-12-26 LAB
2HR DELTA HS TROPONIN: 2 NG/L
ALBUMIN SERPL BCP-MCNC: 3.4 G/DL (ref 3.5–5)
ALP SERPL-CCNC: 152 U/L (ref 46–116)
ALT SERPL W P-5'-P-CCNC: 27 U/L (ref 12–78)
ANION GAP SERPL CALCULATED.3IONS-SCNC: 12 MMOL/L (ref 4–13)
AST SERPL W P-5'-P-CCNC: 27 U/L (ref 5–45)
BASOPHILS # BLD AUTO: 0.05 THOUSANDS/ΜL (ref 0–0.1)
BASOPHILS NFR BLD AUTO: 1 % (ref 0–1)
BILIRUB DIRECT SERPL-MCNC: 0.11 MG/DL (ref 0–0.2)
BILIRUB SERPL-MCNC: 0.44 MG/DL (ref 0.2–1)
BUN SERPL-MCNC: 15 MG/DL (ref 5–25)
CALCIUM SERPL-MCNC: 8.2 MG/DL (ref 8.3–10.1)
CARDIAC TROPONIN I PNL SERPL HS: 11 NG/L
CARDIAC TROPONIN I PNL SERPL HS: 9 NG/L
CHLORIDE SERPL-SCNC: 107 MMOL/L (ref 100–108)
CO2 SERPL-SCNC: 24 MMOL/L (ref 21–32)
CREAT SERPL-MCNC: 0.73 MG/DL (ref 0.6–1.3)
DIGOXIN SERPL-MCNC: 0.4 NG/ML (ref 0.8–2)
EOSINOPHIL # BLD AUTO: 0.13 THOUSAND/ΜL (ref 0–0.61)
EOSINOPHIL NFR BLD AUTO: 2 % (ref 0–6)
ERYTHROCYTE [DISTWIDTH] IN BLOOD BY AUTOMATED COUNT: 14.5 % (ref 11.6–15.1)
GFR SERPL CREATININE-BSD FRML MDRD: 72 ML/MIN/1.73SQ M
GLUCOSE SERPL-MCNC: 145 MG/DL (ref 65–140)
HCT VFR BLD AUTO: 39.9 % (ref 34.8–46.1)
HGB BLD-MCNC: 12.5 G/DL (ref 11.5–15.4)
IMM GRANULOCYTES # BLD AUTO: 0.03 THOUSAND/UL (ref 0–0.2)
IMM GRANULOCYTES NFR BLD AUTO: 0 % (ref 0–2)
LIPASE SERPL-CCNC: 122 U/L (ref 73–393)
LYMPHOCYTES # BLD AUTO: 1.18 THOUSANDS/ΜL (ref 0.6–4.47)
LYMPHOCYTES NFR BLD AUTO: 16 % (ref 14–44)
MAGNESIUM SERPL-MCNC: 2.2 MG/DL (ref 1.6–2.6)
MCH RBC QN AUTO: 30 PG (ref 26.8–34.3)
MCHC RBC AUTO-ENTMCNC: 31.3 G/DL (ref 31.4–37.4)
MCV RBC AUTO: 96 FL (ref 82–98)
MONOCYTES # BLD AUTO: 0.48 THOUSAND/ΜL (ref 0.17–1.22)
MONOCYTES NFR BLD AUTO: 7 % (ref 4–12)
NEUTROPHILS # BLD AUTO: 5.47 THOUSANDS/ΜL (ref 1.85–7.62)
NEUTS SEG NFR BLD AUTO: 74 % (ref 43–75)
NRBC BLD AUTO-RTO: 0 /100 WBCS
PLATELET # BLD AUTO: 208 THOUSANDS/UL (ref 149–390)
PMV BLD AUTO: 10.1 FL (ref 8.9–12.7)
POTASSIUM SERPL-SCNC: 3.7 MMOL/L (ref 3.5–5.3)
PROT SERPL-MCNC: 6.8 G/DL (ref 6.4–8.2)
RBC # BLD AUTO: 4.16 MILLION/UL (ref 3.81–5.12)
SODIUM SERPL-SCNC: 143 MMOL/L (ref 136–145)
TSH SERPL DL<=0.05 MIU/L-ACNC: 2.15 UIU/ML (ref 0.36–3.74)
WBC # BLD AUTO: 7.34 THOUSAND/UL (ref 4.31–10.16)

## 2021-12-26 PROCEDURE — 85025 COMPLETE CBC W/AUTO DIFF WBC: CPT | Performed by: EMERGENCY MEDICINE

## 2021-12-26 PROCEDURE — 36415 COLL VENOUS BLD VENIPUNCTURE: CPT | Performed by: EMERGENCY MEDICINE

## 2021-12-26 PROCEDURE — 84484 ASSAY OF TROPONIN QUANT: CPT | Performed by: EMERGENCY MEDICINE

## 2021-12-26 PROCEDURE — 93005 ELECTROCARDIOGRAM TRACING: CPT

## 2021-12-26 PROCEDURE — 99285 EMERGENCY DEPT VISIT HI MDM: CPT

## 2021-12-26 PROCEDURE — 83735 ASSAY OF MAGNESIUM: CPT | Performed by: EMERGENCY MEDICINE

## 2021-12-26 PROCEDURE — 84443 ASSAY THYROID STIM HORMONE: CPT | Performed by: EMERGENCY MEDICINE

## 2021-12-26 PROCEDURE — 80076 HEPATIC FUNCTION PANEL: CPT | Performed by: EMERGENCY MEDICINE

## 2021-12-26 PROCEDURE — 80162 ASSAY OF DIGOXIN TOTAL: CPT | Performed by: EMERGENCY MEDICINE

## 2021-12-26 PROCEDURE — 83690 ASSAY OF LIPASE: CPT | Performed by: EMERGENCY MEDICINE

## 2021-12-26 PROCEDURE — 99285 EMERGENCY DEPT VISIT HI MDM: CPT | Performed by: EMERGENCY MEDICINE

## 2021-12-26 PROCEDURE — 80048 BASIC METABOLIC PNL TOTAL CA: CPT | Performed by: EMERGENCY MEDICINE

## 2021-12-26 PROCEDURE — 71046 X-RAY EXAM CHEST 2 VIEWS: CPT

## 2021-12-26 NOTE — ED NOTES
Patient transported to [facility] Kurtis Bustamante RN  12/26/21 2056

## 2021-12-26 NOTE — DISCHARGE INSTRUCTIONS
Return to the ER if your chest pain changes in quality or location, or becomes associated with shortness of breath, lightheadedness, vomiting or sweating      Call Dr Zacarias Leon in the morning, let them know you were in the ER, he can review everything that was done and make recomendations

## 2021-12-26 NOTE — ED PROVIDER NOTES
History  Chief Complaint   Patient presents with    Palpitations     pt with hx of afib has been feeling palpitations all morning; between 12 and 1 it began to flutter with pain; to pain subsided within a half hr the pain was midsternal and sis not radiate anywhere; pt denies nausea and vomitting      81 YO female presents with palpitations and chest discomfort that occurred just PTA  Pt states she was feeling well when she developed a feeling of fast and heavy heart rate  States this was persistent and she did develop a gradually worsening chest pressure associated with it  Pt denies associated shortness of breath, lightheadedness, nausea  She notes Hx of atrial fibrillation and unsure if this could be the cause  Pt called EMS and states, at the time that they arrived her symptoms resolved rather rapidly without intervention  Pt offers no complaints on initial physician evaluation  She takes rate controlling agents as well as anticoagulation for her chronic atrial fibrillation  Pt sees her cardiologist regularly  Pt denies SOB/F/C/N/V/D/C, no dysuria, burning on urination or blood in urine  Palpitations  Palpitations quality:  Fast  Onset quality:  Gradual  Duration:  1 hour  Timing:  Constant  Progression:  Resolved  Chronicity:  New  Relieved by:  Nothing  Worsened by:  Nothing  Ineffective treatments:  None tried  Associated symptoms: chest pain    Associated symptoms: no dizziness, no shortness of breath, no vomiting and no weakness    Chest pain:     Quality: pressure      Severity:  Moderate    Onset quality:  Gradual    Duration: 45 minutes  Timing:  Constant    Progression:  Resolved    Chronicity:  New      Prior to Admission Medications   Prescriptions Last Dose Informant Patient Reported? Taking?    Multiple Vitamins-Minerals (PRESERVISION AREDS 2+MULTI VIT) CAPS 12/26/2021 at Unknown time Self Yes Yes   Sig: Take 1 capsule by mouth 2 (two) times a day    apixaban (Eliquis) 2 5 mg 12/26/2021 at Unknown time Self No Yes   Sig: Take 1 tablet (2 5 mg total) by mouth 2 (two) times a day   bisoprolol (ZEBETA) 5 mg tablet 12/26/2021 at Unknown time  No Yes   Sig: Take 0 5 tablets (2 5 mg total) by mouth see administration instructions One half to one tablet daily as directed by physician   digoxin (LANOXIN) 0 125 mg tablet 12/26/2021 at Unknown time Self No Yes   Sig: TAKE 1 TABLET DAILY   fluticasone-salmeterol (ADVAIR, WIXELA) 250-50 mcg/dose inhaler  Self No Yes   Sig: USE 1 INHALATION TWO TIMES A DAY   furosemide (LASIX) 20 mg tablet 12/26/2021 at Unknown time Self No Yes   Sig: Take 0 5 tablets (10 mg total) by mouth 2 (two) times a week   omeprazole (PriLOSEC) 40 MG capsule 12/26/2021 at Unknown time Self No Yes   Sig: TAKE 1 CAPSULE DAILY   Patient taking differently: as needed    pravastatin (PRAVACHOL) 20 mg tablet 12/26/2021 at Unknown time Self No Yes   Sig: TAKE 1 TABLET DAILY      Facility-Administered Medications: None       Past Medical History:   Diagnosis Date    Atrial fibrillation (HCC)     Fracture of ankle     Hyperlipidemia     Hypertension     Macular degeneration     Osteoarthritis     Vitamin D deficiency        Past Surgical History:   Procedure Laterality Date    APPENDECTOMY      PERINEAL  PELVIC RECONSTRUCTION SURGERY         Family History   Problem Relation Age of Onset    Supraventricular tachycardia Family      I have reviewed and agree with the history as documented  E-Cigarette/Vaping     E-Cigarette/Vaping Substances     Social History     Tobacco Use    Smoking status: Never Smoker    Smokeless tobacco: Never Used   Substance Use Topics    Alcohol use: No     Comment: Social drinker per [employer]    Drug use: No       Review of Systems   Constitutional: Negative for chills, fatigue and fever  HENT: Negative for dental problem  Eyes: Negative for visual disturbance  Respiratory: Negative for shortness of breath      Cardiovascular: Positive for chest pain    Gastrointestinal: Negative for abdominal pain, diarrhea and vomiting  Genitourinary: Negative for dysuria and frequency  Musculoskeletal: Negative for arthralgias  Skin: Negative for rash  Neurological: Negative for dizziness, weakness and light-headedness  Psychiatric/Behavioral: Negative for agitation, behavioral problems and confusion  All other systems reviewed and are negative  Physical Exam  Physical Exam  Vitals and nursing note reviewed  Constitutional:       Appearance: Normal appearance  HENT:      Head: Normocephalic and atraumatic  Eyes:      Extraocular Movements: Extraocular movements intact  Conjunctiva/sclera: Conjunctivae normal    Cardiovascular:      Rate and Rhythm: Normal rate  Rhythm irregular  Pulses: Normal pulses  Heart sounds: Normal heart sounds  Pulmonary:      Effort: Pulmonary effort is normal       Breath sounds: Normal breath sounds  Abdominal:      General: There is no distension  Musculoskeletal:         General: Normal range of motion  Cervical back: Normal range of motion  Skin:     Findings: No rash  Neurological:      General: No focal deficit present  Mental Status: She is alert  Cranial Nerves: No cranial nerve deficit     Psychiatric:         Mood and Affect: Mood normal          Vital Signs  ED Triage Vitals   Temperature Pulse Respirations Blood Pressure SpO2   12/26/21 1415 12/26/21 1404 12/26/21 1404 12/26/21 1404 12/26/21 1404   98 7 °F (37 1 °C) 98 18 (!) 192/102 98 %      Temp Source Heart Rate Source Patient Position - Orthostatic VS BP Location FiO2 (%)   12/26/21 1415 12/26/21 1404 12/26/21 1404 12/26/21 1404 --   Oral Monitor Lying Right arm       Pain Score       12/26/21 1404       No Pain           Vitals:    12/26/21 1404 12/26/21 1415 12/26/21 1641   BP: (!) 192/102 (!) 181/82 (!) 189/86   Pulse: 98 90 83   Patient Position - Orthostatic VS: Lying  Lying         Visual Acuity      ED Medications  Medications - No data to display    Diagnostic Studies  Results Reviewed     Procedure Component Value Units Date/Time    HS Troponin I 2hr [[id number]] Collected: 12/26/21 1602    Lab Status: Final result Specimen: Blood from Arm, Left Updated: 12/26/21 1638     hs TnI 2hr 11 ng/L      Delta 2hr hsTnI 2 ng/L     HS Troponin I 4hr [[id number]]     Lab Status: No result Specimen: Blood     HS Troponin 0hr (reflex protocol) [[id number]]  (Normal) Collected: 12/26/21 1441    Lab Status: Final result Specimen: Blood from Arm, Left Updated: 12/26/21 1526     hs TnI 0hr 9 ng/L     TSH [[id number]]  (Normal) Collected: 12/26/21 1441    Lab Status: Final result Specimen: Blood from Arm, Left Updated: 12/26/21 1515     TSH 3RD GENERATON 2 145 uIU/mL     Narrative:      Patients undergoing fluorescein dye angiography may retain small amounts of fluorescein in the body for 48-72 hours post procedure  Samples containing fluorescein can produce falsely depressed TSH values  If the patient had this procedure,a specimen should be resubmitted post fluorescein clearance        Hepatic function panel [[id number]]  (Abnormal) Collected: 12/26/21 1441    Lab Status: Final result Specimen: Blood from Arm, Left Updated: 12/26/21 1515     Total Bilirubin 0 44 mg/dL      Bilirubin, Direct 0 11 mg/dL      Alkaline Phosphatase 152 U/L      AST 27 U/L      ALT 27 U/L      Total Protein 6 8 g/dL      Albumin 3 4 g/dL     Magnesium [[id number]]  (Normal) Collected: 12/26/21 1441    Lab Status: Final result Specimen: Blood from Arm, Left Updated: 12/26/21 1515     Magnesium 2 2 mg/dL     Lipase [[id number]]  (Normal) Collected: 12/26/21 1441    Lab Status: Final result Specimen: Blood from Arm, Left Updated: 12/26/21 1515     Lipase 122 u/L     Digoxin level [[id number]]  (Abnormal) Collected: 12/26/21 1441    Lab Status: Final result Specimen: Blood from Arm, Left Updated: 12/26/21 1515     Digoxin Lvl 0 4 ng/mL     Basic metabolic panel [[id number]]  (Abnormal) Collected: 12/26/21 1441    Lab Status: Final result Specimen: Blood from Arm, Left Updated: 12/26/21 1503     Sodium 143 mmol/L      Potassium 3 7 mmol/L      Chloride 107 mmol/L      CO2 24 mmol/L      ANION GAP 12 mmol/L      BUN 15 mg/dL      Creatinine 0 73 mg/dL      Glucose 145 mg/dL      Calcium 8 2 mg/dL      eGFR 72 ml/min/1 73sq m     Narrative:      [place] guidelines for Chronic Kidney Disease (CKD):     Stage 1 with normal or high GFR (GFR > 90 mL/min/1 73 square meters)    Stage 2 Mild CKD (GFR = 60-89 mL/min/1 73 square meters)    Stage 3A Moderate CKD (GFR = 45-59 mL/min/1 73 square meters)    Stage 3B Moderate CKD (GFR = 30-44 mL/min/1 73 square meters)    Stage 4 Severe CKD (GFR = 15-29 mL/min/1 73 square meters)    Stage 5 End Stage CKD (GFR <15 mL/min/1 73 square meters)  Note: GFR calculation is accurate only with a steady state creatinine    CBC and differential [[id number]]  (Abnormal) Collected: 12/26/21 1441    Lab Status: Final result Specimen: Blood from Arm, Left Updated: 12/26/21 1447     WBC 7 34 Thousand/uL      RBC 4 16 Million/uL      Hemoglobin 12 5 g/dL      Hematocrit 39 9 %      MCV 96 fL      MCH 30 0 pg      MCHC 31 3 g/dL      RDW 14 5 %      MPV 10 1 fL      Platelets 835 Thousands/uL      nRBC 0 /100 WBCs      Neutrophils Relative 74 %      Immat GRANS % 0 %      Lymphocytes Relative 16 %      Monocytes Relative 7 %      Eosinophils Relative 2 %      Basophils Relative 1 %      Neutrophils Absolute 5 47 Thousands/µL      Immature Grans Absolute 0 03 Thousand/uL      Lymphocytes Absolute 1 18 Thousands/µL      Monocytes Absolute 0 48 Thousand/µL      Eosinophils Absolute 0 13 Thousand/µL      Basophils Absolute 0 05 Thousands/µL                  XR chest 2 views    (Results Pending)              Procedures  ECG 12 Lead Documentation Only    Date/Time: 12/26/2021 2:19 PM  Performed by: Bina Valdez MD  Authorized by: Howie Chin MD     ECG reviewed by me, the ED Provider: yes    Patient location:  ED  Previous ECG:     Previous ECG:  Compared to current    Comparison ECG info:  6/24/2021    Similarity:  No change  Interpretation:     Interpretation: normal    Rate:     ECG rate:  90    ECG rate assessment: normal    Rhythm:     Rhythm: atrial fibrillation    QRS:     QRS axis:  Normal    QRS intervals:  Normal  Conduction:     Conduction: normal    ST segments:     ST segments:  Normal  T waves:     T waves: normal               ED Course  ED Course as of 12/26/21 1805   Sun Dec 26, 2021   1651 Discussed with Pt, ECG is unchanged from previous and Delta troponin is 2  Explained it is not possible to tell the exact cause for her symptoms but it did not result in ischemia which would lead to an elevated troponin  Did offer admission to Pt should she continue to have concern, Pt is opting to be discharged, states she will return should she have further symptoms  SBIRT 22yo+      Most Recent Value   SBIRT (22 yo +)    In order to provide better care to our patients, we are screening all of our patients for alcohol and drug use  Would it be okay to ask you these screening questions? Unable to answer at this time Filed at: 12/26/2021 1444                    MDM  Number of Diagnoses or Management Options  Palpitations: new and requires workup  Diagnosis management comments: 1  Palpitations - Pt with known atrial fibrillation, states palpitations and chest pain for 45 minutes PTA, resolved by the time of physician evaluation  ECG shows atrial fibrillation, rate of 90, no change from previous  Will check troponin, will order delta ECG and troponin, CBC for anemia, metabolic panel for electrolyte abnormalities and dehydration, CXR          Amount and/or Complexity of Data Reviewed  Clinical lab tests: ordered and reviewed  Tests in the radiology section of CPT®: ordered and reviewed  Obtain history from someone other than the patient: yes  Independent visualization of images, tracings, or specimens: yes    Patient Progress  Patient progress: improved      Disposition  Final diagnoses:   Palpitations     Time reflects when diagnosis was documented in both MDM as applicable and the Disposition within this note     Time User Action Codes Description Comment    12/26/2021  4:53 PM Manoj DE LA CRUZ Add [R00 2] Palpitations       ED Disposition     ED Disposition Condition Date/Time Comment    Discharge Stable Sun Dec 26, 2021  4:53 PM Prudence Licha Gold discharge to home/self care  Follow-up Information     Follow up With Specialties Details Why Estevan Davis MD Cardiology Call in 1 day  [facility] [address] [facility]            Discharge Medication List as of 12/26/2021  4:54 PM      CONTINUE these medications which have NOT CHANGED    Details   apixaban (Eliquis) 2 5 mg Take 1 tablet (2 5 mg total) by mouth 2 (two) times a day, Starting Tue 5/19/2020, Normal      bisoprolol (ZEBETA) 5 mg tablet Take 0 5 tablets (2 5 mg total) by mouth see administration instructions One half to one tablet daily as directed by physician, Starting Wed 11/3/2021, Normal      digoxin (LANOXIN) 0 125 mg tablet TAKE 1 TABLET DAILY, Normal      fluticasone-salmeterol (ADVAIR, WIXELA) 250-50 mcg/dose inhaler USE 1 INHALATION TWO TIMES A DAY, Normal      furosemide (LASIX) 20 mg tablet Take 0 5 tablets (10 mg total) by mouth 2 (two) times a week, Starting Thu 2/25/2021, Normal      Multiple Vitamins-Minerals (PRESERVISION AREDS 2+MULTI VIT) CAPS Take 1 capsule by mouth 2 (two) times a day , Historical Med      omeprazole (PriLOSEC) 40 MG capsule TAKE 1 CAPSULE DAILY, Normal      pravastatin (PRAVACHOL) 20 mg tablet TAKE 1 TABLET DAILY, Normal             No discharge procedures on file      PDMP Review     None          ED Provider  Electronically Signed by           Alfreda Carmona MD  12/26/21 1065

## 2021-12-27 ENCOUNTER — TELEPHONE (OUTPATIENT)
Dept: CARDIOLOGY CLINIC | Facility: CLINIC | Age: 86
End: 2021-12-27

## 2021-12-27 LAB
ATRIAL RATE: 108 BPM
ATRIAL RATE: 576 BPM
P AXIS: 79 DEGREES
QRS AXIS: 45 DEGREES
QRS AXIS: 50 DEGREES
QRSD INTERVAL: 78 MS
QRSD INTERVAL: 78 MS
QT INTERVAL: 330 MS
QT INTERVAL: 362 MS
QTC INTERVAL: 398 MS
QTC INTERVAL: 403 MS
T WAVE AXIS: 59 DEGREES
T WAVE AXIS: 61 DEGREES
VENTRICULAR RATE: 73 BPM
VENTRICULAR RATE: 90 BPM

## 2021-12-27 PROCEDURE — 93010 ELECTROCARDIOGRAM REPORT: CPT

## 2022-01-10 ENCOUNTER — OFFICE VISIT (OUTPATIENT)
Dept: CARDIOLOGY CLINIC | Facility: CLINIC | Age: 87
End: 2022-01-10
Payer: MEDICARE

## 2022-01-10 VITALS
SYSTOLIC BLOOD PRESSURE: 132 MMHG | WEIGHT: 129.8 LBS | DIASTOLIC BLOOD PRESSURE: 60 MMHG | HEART RATE: 78 BPM | BODY MASS INDEX: 22.99 KG/M2

## 2022-01-10 DIAGNOSIS — I49.3 PREMATURE VENTRICULAR CONTRACTION: ICD-10-CM

## 2022-01-10 DIAGNOSIS — I50.32 CHRONIC DIASTOLIC (CONGESTIVE) HEART FAILURE (HCC): ICD-10-CM

## 2022-01-10 DIAGNOSIS — E78.2 MIXED HYPERLIPIDEMIA: ICD-10-CM

## 2022-01-10 DIAGNOSIS — I48.21 PERMANENT ATRIAL FIBRILLATION (HCC): Primary | ICD-10-CM

## 2022-01-10 DIAGNOSIS — I10 PRIMARY HYPERTENSION: ICD-10-CM

## 2022-01-10 PROCEDURE — 99214 OFFICE O/P EST MOD 30 MIN: CPT | Performed by: INTERNAL MEDICINE

## 2022-01-10 NOTE — PROGRESS NOTES
Cardiology Follow Up    Minus Mckenna  7/9/1930  [address] [address]  [phone]    Reason for visit: 6 month FU for PAF, HTN, HLP and PVCs and chronic diastolic CHF-recent ER visit for atypical chest pain  1  Permanent atrial fibrillation ([address] )     2  Primary hypertension     3  Premature ventricular contraction     4  Mixed hyperlipidemia     5  Chronic diastolic (congestive) heart failure (HCC)         Interval History: The patient was seen in the ER a few weeks ago for atypical chest pain  She ruled out for MI  She has not had recurrence of this  She does get a brief palpitations which sounds more like a premature beat  She denies dyspnea  She continues to use bisoprolol prn sbp>150 mm HG    She states she takes this about once monthly  She has ongoing modest edema of the LEs  Sergei Rachel she takes lasix 10 mg twice weekly  She denies dizziness  Patient Active Problem List   Diagnosis    Anemia    Hyperlipidemia    Hypertension    Permanent atrial fibrillation (HCC)    Premature ventricular contraction    Vitamin D deficiency    Esophageal reflux    Ulnar nerve entrapment at elbow    Numbness of hand    Lower extremity edema    Chronic diastolic (congestive) heart failure (HCC)     Past Medical History:   Diagnosis Date    Atrial fibrillation (HCC)     Fracture of ankle     Hyperlipidemia     Hypertension     Macular degeneration     Osteoarthritis     Vitamin D deficiency      Social History     Socioeconomic History    Marital status:       Spouse name: Not on file    Number of children: Not on file    Years of education: Not on file    Highest education level: Not on file   Occupational History    Not on file   Tobacco Use    Smoking status: Never Smoker    Smokeless tobacco: Never Used   Substance and Sexual Activity    Alcohol use: No     Comment: Social drinker per [employer]    Drug use: No    Sexual activity: Not on file   Other Topics Concern    Not on file   Social History Narrative    Not on file     Social Determinants of Health     Financial Resource Strain: Not on file   Food Insecurity: Not on file   Transportation Needs: Not on file   Physical Activity: Not on file   Stress: Not on file   Social Connections: Not on file   Intimate Partner Violence: Not on file   Housing Stability: Not on file      Family History   Problem Relation Age of Onset    Supraventricular tachycardia Family      Past Surgical History:   Procedure Laterality Date    APPENDECTOMY      PERINEAL  PELVIC RECONSTRUCTION SURGERY         Current Outpatient Medications:     apixaban (Eliquis) 2 5 mg, Take 1 tablet (2 5 mg total) by mouth 2 (two) times a day, Disp: 180 tablet, Rfl: 3    bisoprolol (ZEBETA) 5 mg tablet, Take 0 5 tablets (2 5 mg total) by mouth see administration instructions One half to one tablet daily as directed by physician (Patient taking differently: Take 2 5 mg by mouth as needed One half to one tablet daily as directed by physician ), Disp: 90 tablet, Rfl: 3    digoxin (LANOXIN) 0 125 mg tablet, TAKE 1 TABLET DAILY, Disp: 90 tablet, Rfl: 3    furosemide (LASIX) 20 mg tablet, Take 0 5 tablets (10 mg total) by mouth 2 (two) times a week, Disp: 12 tablet, Rfl: 3    Multiple Vitamins-Minerals (PRESERVISION AREDS 2+MULTI VIT) CAPS, Take 1 capsule by mouth 2 (two) times a day , Disp: , Rfl:     pravastatin (PRAVACHOL) 20 mg tablet, TAKE 1 TABLET DAILY, Disp: 90 tablet, Rfl: 3    fluticasone-salmeterol (ADVAIR, WIXELA) 250-50 mcg/dose inhaler, USE 1 INHALATION TWO TIMES A DAY (Patient not taking: Reported on 1/10/2022), Disp: 60 each, Rfl: 11    omeprazole (PriLOSEC) 40 MG capsule, TAKE 1 CAPSULE DAILY (Patient not taking: Reported on 1/10/2022), Disp: 90 capsule, Rfl: 4  No Known Allergies         Review of Systems:  Review of Systems Constitutional: Positive for fatigue  Negative for activity change, appetite change and unexpected weight change  HENT: Positive for hearing loss  Respiratory: Positive for cough (due to post nasal ggt)  Negative for chest tightness, shortness of breath and wheezing  Cardiovascular: Positive for palpitations and leg swelling  Negative for chest pain  Gastrointestinal: Positive for constipation  Negative for abdominal pain, blood in stool and diarrhea  Genitourinary: Positive for frequency  Negative for dysuria, hematuria and urgency  Musculoskeletal: Positive for arthralgias and back pain (this AM)  Negative for gait problem and joint swelling  Neurological: Negative for dizziness, syncope and headaches  Psychiatric/Behavioral: Negative for agitation, behavioral problems, confusion and decreased concentration  Physical Exam:  Vitals:    01/10/22 1323   BP: 132/60   Pulse: 78   Weight: 58 9 kg (129 lb 12 8 oz)       Physical Exam  Constitutional:       General: She is not in acute distress  Appearance: Normal appearance  She is not ill-appearing  HENT:      Head: Normocephalic and atraumatic  Mouth/Throat:      Mouth: Mucous membranes are moist       Pharynx: No oropharyngeal exudate or posterior oropharyngeal erythema  Eyes:      General: No scleral icterus  Conjunctiva/sclera: Conjunctivae normal    Neck:      Thyroid: No thyroid mass or thyromegaly  Vascular: Normal carotid pulses  No carotid bruit or JVD  Cardiovascular:      Rate and Rhythm: Normal rate  Rhythm irregular  Pulses:           Dorsalis pedis pulses are 1+ on the right side and 1+ on the left side  Heart sounds: No murmur heard  No friction rub  No gallop  Pulmonary:      Breath sounds: Normal breath sounds  No wheezing, rhonchi or rales  Abdominal:      Palpations: There is no hepatomegaly, splenomegaly or mass  Tenderness: There is no abdominal tenderness     Musculoskeletal: General: Swelling (1-2+ L>RLE) and deformity (kyphosis) present  Cervical back: Neck supple  Skin:     General: Skin is warm  Coloration: Skin is not jaundiced or pale  Findings: No bruising, erythema, lesion or rash  Neurological:      General: No focal deficit present  Mental Status: She is alert and oriented to person, place, and time  Cranial Nerves: No cranial nerve deficit  Sensory: No sensory deficit  Motor: No weakness  Psychiatric:         Mood and Affect: Mood normal          Behavior: Behavior normal          Thought Content: Thought content normal          Judgment: Judgment normal          Discussion/Summary:  1  Permanent AFib  Rate well controlled on digoxin 0 125 mg daily  Recent digoxin level 0 4 which is acceptable considering good rate control  On Eliquis 2 5 mg b i d  For stroke prevention  I have discussed doing Watchman procedure with Dr Maryjane Varma which he felt despite her age she might be a candidate  When I discussed this with the patient she was inclined to continue with current therapy and forego this    She has had some falls due to poor vision but these have been somewhat less frequent  2  Hypertension  Patient on bisoprolol 2 5 mg p r n  Systolic blood pressure greater than 150 mmHg  She currently is taking this on a very occasional basis  3  PVCs  Fairly infrequent  Patient has had palpitations consistent with these which are fairly occasional at this point  No specific treatment  4  Hyperlipidemia  Patient on pravastatin 20 mg daily  LDL cholesterol when last checked a bit over a year ago was 102  This is adequate control considering no overt vascular disease  5  Chronic diastolic heart failure    Appears to be well controlled on furosemide 10 mg 2 times a week    FU 6 months      Jude Bocanegra MD

## 2022-01-30 ENCOUNTER — APPOINTMENT (EMERGENCY)
Dept: NON INVASIVE DIAGNOSTICS | Facility: HOSPITAL | Age: 87
End: 2022-01-30
Payer: MEDICARE

## 2022-01-30 ENCOUNTER — OFFICE VISIT (OUTPATIENT)
Dept: URGENT CARE | Facility: MEDICAL CENTER | Age: 87
End: 2022-01-30
Payer: MEDICARE

## 2022-01-30 ENCOUNTER — HOSPITAL ENCOUNTER (EMERGENCY)
Facility: HOSPITAL | Age: 87
Discharge: HOME | End: 2022-01-30
Attending: EMERGENCY MEDICINE
Payer: MEDICARE

## 2022-01-30 VITALS — TEMPERATURE: 97.1 F | DIASTOLIC BLOOD PRESSURE: 90 MMHG | OXYGEN SATURATION: 97 % | SYSTOLIC BLOOD PRESSURE: 171 MMHG

## 2022-01-30 VITALS
HEIGHT: 63 IN | OXYGEN SATURATION: 96 % | WEIGHT: 125.66 LBS | SYSTOLIC BLOOD PRESSURE: 162 MMHG | RESPIRATION RATE: 21 BRPM | BODY MASS INDEX: 22.27 KG/M2 | HEART RATE: 78 BPM | TEMPERATURE: 98.2 F | DIASTOLIC BLOOD PRESSURE: 76 MMHG

## 2022-01-30 DIAGNOSIS — L03.90 CELLULITIS: Primary | ICD-10-CM

## 2022-01-30 DIAGNOSIS — M79.662 PAIN OF LEFT CALF: ICD-10-CM

## 2022-01-30 DIAGNOSIS — M79.662 PAIN OF LEFT CALF: Primary | ICD-10-CM

## 2022-01-30 PROCEDURE — 99283 EMERGENCY DEPT VISIT LOW MDM: CPT

## 2022-01-30 PROCEDURE — 99213 OFFICE O/P EST LOW 20 MIN: CPT | Performed by: PHYSICIAN ASSISTANT

## 2022-01-30 PROCEDURE — 93971 EXTREMITY STUDY: CPT

## 2022-01-30 PROCEDURE — G0463 HOSPITAL OUTPT CLINIC VISIT: HCPCS | Performed by: PHYSICIAN ASSISTANT

## 2022-01-30 PROCEDURE — 99284 EMERGENCY DEPT VISIT MOD MDM: CPT | Performed by: PHYSICIAN ASSISTANT

## 2022-01-30 RX ORDER — CEPHALEXIN 500 MG/1
500 CAPSULE ORAL 4 TIMES DAILY
Qty: 40 CAPSULE | Refills: 0 | Status: SHIPPED | OUTPATIENT
Start: 2022-01-30 | End: 2022-02-09

## 2022-01-30 RX ORDER — CEPHALEXIN 250 MG/1
500 CAPSULE ORAL ONCE
Status: COMPLETED | OUTPATIENT
Start: 2022-01-30 | End: 2022-01-30

## 2022-01-30 RX ADMIN — CEPHALEXIN 500 MG: 250 CAPSULE ORAL at 14:56

## 2022-01-30 NOTE — PROGRESS NOTES
[facility]        NAME: Karin Mccoy is a 80 y o  female  : 1930    MRN: [id number]  DATE: 2022  TIME: 11:37 AM    Assessment and Plan   Pain of left calf [M79 [id number]]  1  Pain of left calf       L calf pain and swelling   (+)Arabella (+)Pain to palpation of calf  Pt already on eliquis due to Afib  Her family wants her evaluated for DVT  Patient Instructions     Go to ER for further evaluation    Chief Complaint     Chief Complaint   Patient presents with    Leg Pain     pain in left leg  Fine when sitting, stabbing pain when walking  Left calve is hard and painful to the touch  Began Thursday morning and has continued since  History of Present Illness       Leg Pain   Incident onset: Atraumatic pain and swelling started 3 days ago  The incident occurred at home  Pain location: L calf  The quality of the pain is described as aching  The pain is moderate  The pain has been constant since onset  Pertinent negatives include no inability to bear weight, numbness or tingling  The symptoms are aggravated by palpation, weight bearing and movement  She has tried nothing for the symptoms  The treatment provided no relief  Pt on eliquis for afib    Review of Systems   Review of Systems   Constitutional: Negative for chills, diaphoresis, fatigue and fever  HENT: Negative for congestion, ear pain, postnasal drip, rhinorrhea, sinus pressure, sinus pain, sneezing, sore throat and voice change  Eyes: Negative  Respiratory: Negative for cough, chest tightness, shortness of breath and wheezing  Cardiovascular: Positive for leg swelling  Negative for chest pain and palpitations  Gastrointestinal: Negative for abdominal pain, constipation, diarrhea, nausea and vomiting  Endocrine: Negative  Genitourinary: Negative for dysuria  Musculoskeletal: Positive for gait problem  Negative for back pain, myalgias and neck pain  Skin: Negative for pallor and rash  Allergic/Immunologic: Negative  Neurological: Negative for dizziness, tingling, syncope, numbness and headaches  Hematological: Negative  Psychiatric/Behavioral: Negative            Current Medications       Current Outpatient Medications:     apixaban (Eliquis) 2 5 mg, Take 1 tablet (2 5 mg total) by mouth 2 (two) times a day, Disp: 180 tablet, Rfl: 3    bisoprolol (ZEBETA) 5 mg tablet, Take 0 5 tablets (2 5 mg total) by mouth see administration instructions One half to one tablet daily as directed by physician (Patient taking differently: Take 2 5 mg by mouth as needed One half to one tablet daily as directed by physician ), Disp: 90 tablet, Rfl: 3    digoxin (LANOXIN) 0 125 mg tablet, TAKE 1 TABLET DAILY, Disp: 90 tablet, Rfl: 3    furosemide (LASIX) 20 mg tablet, Take 0 5 tablets (10 mg total) by mouth 2 (two) times a week, Disp: 12 tablet, Rfl: 3    Multiple Vitamins-Minerals (PRESERVISION AREDS 2+MULTI VIT) CAPS, Take 1 capsule by mouth 2 (two) times a day , Disp: , Rfl:     pravastatin (PRAVACHOL) 20 mg tablet, TAKE 1 TABLET DAILY, Disp: 90 tablet, Rfl: 3    fluticasone-salmeterol (ADVAIR, WIXELA) 250-50 mcg/dose inhaler, USE 1 INHALATION TWO TIMES A DAY (Patient not taking: Reported on 1/10/2022), Disp: 60 each, Rfl: 11    omeprazole (PriLOSEC) 40 MG capsule, TAKE 1 CAPSULE DAILY (Patient not taking: Reported on 1/10/2022), Disp: 90 capsule, Rfl: 4    Current Allergies     Allergies as of 01/30/2022    (No Known Allergies)            The following portions of the patient's history were reviewed and updated as appropriate: allergies, current medications, past family history, past medical history, past social history, past surgical history and problem list      Past Medical History:   Diagnosis Date    Atrial fibrillation ([address] )     Fracture of ankle     Hyperlipidemia     Hypertension     Macular degeneration     Osteoarthritis     Vitamin D deficiency        Past Surgical History: Procedure Laterality Date    APPENDECTOMY      PERINEAL  PELVIC RECONSTRUCTION SURGERY         Family History   Problem Relation Age of Onset    Supraventricular tachycardia Family          Medications have been verified  Objective   BP (!) 171/90 (BP Location: Right arm)   Temp (!) 97 1 °F (36 2 °C)   SpO2 97%        Physical Exam     Physical Exam  Vitals and nursing note reviewed  Constitutional:       General: She is not in acute distress  Appearance: Normal appearance  She is well-developed  She is not ill-appearing or diaphoretic  HENT:      Head: Normocephalic and atraumatic  Right Ear: Tympanic membrane, ear canal and external ear normal       Left Ear: Tympanic membrane, ear canal and external ear normal       Nose: No congestion or rhinorrhea  Mouth/Throat:      Pharynx: No oropharyngeal exudate or posterior oropharyngeal erythema  Eyes:      General:         Right eye: No discharge  Left eye: No discharge  Conjunctiva/sclera: Conjunctivae normal    Cardiovascular:      Rate and Rhythm: Normal rate and regular rhythm  Heart sounds: Normal heart sounds  Pulmonary:      Effort: Pulmonary effort is normal  No respiratory distress  Breath sounds: Normal breath sounds  No wheezing, rhonchi or rales  Musculoskeletal:      Cervical back: Normal range of motion and neck supple  Comments: L calf swelling, mild erythema  TTP to posterior calf  (+)Arabella sign  Walks with limp to this side  Lymphadenopathy:      Cervical: No cervical adenopathy  Skin:     General: Skin is warm  Capillary Refill: Capillary refill takes less than 2 seconds  Coloration: Skin is not pale  Findings: No rash  Neurological:      Mental Status: She is alert

## 2022-01-30 NOTE — ED PROVIDER NOTES
History  Chief Complaint   Patient presents with    Leg Pain     Pt reports left leg pain with ambulation  reports initially left knee pain now reports swelling in calf  Reports taking eliquis  Reports since thursday  This is a 80-year-old female patient who presents with left calf pain swelling x4 days  Initially started up by her knee and now has moved into her calf  There has been no injury  No numbness or tingling no redness or warmth  No fever chills  Patient does take Eliquis  Was seen in urgent care was sent in to rule out DVT  Nothing makes it better or worse  Nothing tried over-the-counter  Denies any fever chills headache blurred vision double vision cough congestion sore throat chest pain or shortness of breath no nausea vomiting diarrhea abdominal pain no urinary symptoms  Differential diagnosis includes not limited to DVT refractory to Eliquis, ruptured Baker cyst, cellulitis  Prior to Admission Medications   Prescriptions Last Dose Informant Patient Reported? Taking?    Multiple Vitamins-Minerals (PRESERVISION AREDS 2+MULTI VIT) CAPS  Self Yes No   Sig: Take 1 capsule by mouth 2 (two) times a day    apixaban (Eliquis) 2 5 mg  Self No No   Sig: Take 1 tablet (2 5 mg total) by mouth 2 (two) times a day   bisoprolol (ZEBETA) 5 mg tablet  Self No No   Sig: Take 0 5 tablets (2 5 mg total) by mouth see administration instructions One half to one tablet daily as directed by physician   Patient taking differently: Take 2 5 mg by mouth as needed One half to one tablet daily as directed by physician    digoxin (LANOXIN) 0 125 mg tablet  Self No No   Sig: TAKE 1 TABLET DAILY   fluticasone-salmeterol (ADVAIR, WIXELA) 250-50 mcg/dose inhaler  Self No No   Sig: USE 1 INHALATION TWO TIMES A DAY   Patient not taking: Reported on 1/10/2022   furosemide (LASIX) 20 mg tablet  Self No No   Sig: Take 0 5 tablets (10 mg total) by mouth 2 (two) times a week   omeprazole (PriLOSEC) 40 MG capsule  Self No No   Sig: TAKE 1 CAPSULE DAILY   Patient not taking: Reported on 1/10/2022   pravastatin (PRAVACHOL) 20 mg tablet  Self No No   Sig: TAKE 1 TABLET DAILY      Facility-Administered Medications: None       Past Medical History:   Diagnosis Date    Atrial fibrillation (HCC)     Fracture of ankle     Hyperlipidemia     Hypertension     Macular degeneration     Osteoarthritis     Vitamin D deficiency        Past Surgical History:   Procedure Laterality Date    APPENDECTOMY      PERINEAL  PELVIC RECONSTRUCTION SURGERY         Family History   Problem Relation Age of Onset    Supraventricular tachycardia Family      I have reviewed and agree with the history as documented  E-Cigarette/Vaping     E-Cigarette/Vaping Substances     Social History     Tobacco Use    Smoking status: Never Smoker    Smokeless tobacco: Never Used   Substance Use Topics    Alcohol use: No     Comment: Social drinker per [employer]    Drug use: No       Review of Systems   Constitutional: Negative for chills, diaphoresis, fatigue and fever  HENT: Negative for congestion, ear pain, nosebleeds and sore throat  Eyes: Negative for photophobia, pain, discharge and visual disturbance  Respiratory: Negative for cough, choking, chest tightness, shortness of breath and wheezing  Cardiovascular: Negative for chest pain and palpitations  Gastrointestinal: Negative for abdominal distention, abdominal pain, diarrhea and vomiting  Genitourinary: Negative for dysuria, flank pain and frequency  Musculoskeletal: Positive for gait problem  Negative for back pain and joint swelling  Left calf swollen tender negative palpable cord no fluctuance induration or erythema warmth   Skin: Negative for color change and rash  Neurological: Negative for dizziness, syncope and headaches  Psychiatric/Behavioral: Negative for behavioral problems and confusion  The patient is not nervous/anxious      All other systems reviewed and are negative  Physical Exam  Physical Exam  Vitals and nursing note reviewed  Constitutional:       General: She is not in acute distress  Appearance: She is not ill-appearing, toxic-appearing or diaphoretic  HENT:      Head: Normocephalic and atraumatic  Right Ear: Tympanic membrane, ear canal and external ear normal       Left Ear: Tympanic membrane, ear canal and external ear normal       Nose: Nose normal  No congestion or rhinorrhea  Mouth/Throat:      Mouth: Mucous membranes are dry  Pharynx: Oropharynx is clear  No oropharyngeal exudate or posterior oropharyngeal erythema  Eyes:      Extraocular Movements: Extraocular movements intact  Conjunctiva/sclera: Conjunctivae normal       Pupils: Pupils are equal, round, and reactive to light  Cardiovascular:      Rate and Rhythm: Normal rate and regular rhythm  Pulmonary:      Effort: Pulmonary effort is normal  No respiratory distress  Breath sounds: Normal breath sounds  Abdominal:      General: Bowel sounds are normal       Palpations: Abdomen is soft  Tenderness: There is no abdominal tenderness  Musculoskeletal:         General: Normal range of motion  Cervical back: Normal range of motion and neck supple  No rigidity or tenderness  Right lower leg: No edema  Legs:    Lymphadenopathy:      Cervical: No cervical adenopathy  Skin:     General: Skin is warm and dry  Capillary Refill: Capillary refill takes less than 2 seconds  Findings: No rash  Neurological:      General: No focal deficit present  Mental Status: She is oriented to person, place, and time  Mental status is at baseline     Psychiatric:         Mood and Affect: Mood normal          Behavior: Behavior normal          Vital Signs  ED Triage Vitals [01/30/22 1211]   Temperature Pulse Respirations Blood Pressure SpO2   98 2 °F (36 8 °C) 95 18 (!) 182/86 98 %      Temp Source Heart Rate Source Patient Position - Orthostatic VS BP Location FiO2 (%)   Oral Monitor Sitting Right arm --      Pain Score       --           Vitals:    01/30/22 1211 01/30/22 1230   BP: (!) 182/86 (!) 178/86   Pulse: 95 88   Patient Position - Orthostatic VS: Sitting Sitting         Visual Acuity      ED Medications  Medications   cephalexin (KEFLEX) capsule 500 mg (has no administration in time range)       Diagnostic Studies  Results Reviewed     None                 VAS lower limb venous duplex study, unilateral/limited    (Results Pending)              Procedures  Procedures         ED Course  ED Course as of 01/30/22 1424   Sun Jan 30, 2022   1354 Negative DVT                               SBIRT 22yo+      Most Recent Value   SBIRT (25 yo +)    In order to provide better care to our patients, we are screening all of our patients for alcohol and drug use  Would it be okay to ask you these screening questions? No Filed at: 01/30/2022 1230   Initial Alcohol Screen: US AUDIT-C     1  How often do you have a drink containing alcohol? 0 Filed at: 01/30/2022 1230   2  How many drinks containing alcohol do you have on a typical day you are drinking? 0 Filed at: 01/30/2022 1230   3a  Male UNDER 65: How often do you have five or more drinks on one occasion? 0 Filed at: 01/30/2022 1230   3b  FEMALE Any Age, or MALE 65+: How often do you have 4 or more drinks on one occassion? 0 Filed at: 01/30/2022 1230   Audit-C Score 0 Filed at: 01/30/2022 1230   DARCIE: How many times in the past year have you    Used an illegal drug or used a prescription medication for non-medical reasons?  Never Filed at: 01/30/2022 1230                    MDM    Disposition  Final diagnoses:   Cellulitis   Pain of left calf     Time reflects when diagnosis was documented in both MDM as applicable and the Disposition within this note     Time User Action Codes Description Comment    1/30/2022  2:22 PM Jun Donald Add [L03 [id number]] Cellulitis     1/30/2022  2:22 PM [place], [place] [[id number]] Pain of left calf       ED Disposition     ED Disposition Condition Date/Time Comment    Discharge Stable Sun Jan 30, 2022  2:22 PM Prudence Geovanna Gustafson discharge to home/self care  Follow-up Information     Follow up With Specialties Details Why Contact Info    Maria Eugenia Panchal MD Internal Medicine In 1 day  [id number]  [address]  [phone]            Patient's Medications   Discharge Prescriptions    CEPHALEXIN (KEFLEX) 500 MG CAPSULE    Take 1 capsule (500 mg total) by mouth 4 (four) times a day for 10 days Start January 31, 2022       Start Date: 1/30/2022 End Date: 2/9/2022       Order Dose: 500 mg       Quantity: 40 capsule    Refills: 0       No discharge procedures on file      PDMP Review     None          ED Provider  Electronically Signed by           Gabrile Gilmore PA-C  01/30/22 1420

## 2022-01-30 NOTE — ED ATTENDING ATTESTATION
1/30/2022  IJudith DO, saw and evaluated the patient  I have discussed the patient with the resident/non-physician practitioner and agree with the resident's/non-physician practitioner's findings, Plan of Care, and MDM as documented in the resident's/non-physician practitioner's note, except where noted  All available labs and Radiology studies were reviewed  I was present for key portions of any procedure(s) performed by the resident/non-physician practitioner and I was immediately available to provide assistance  At this point I agree with the current assessment done in the Emergency Department  I have conducted an independent evaluation of this patient a history and physical is as follows:    79 yo F presenting with 4 days of atraumatic L lower leg pain/swelling  No inciting event/injury  Pain started with L knee and then woke up with lower leg swelling 3 days ago  Erythema/swelling to distal shin  Reports the swelling has actually decreased    Compartments soft, distally NV intact, erythema to distal shin  Venous duplex negative   Treating for cellulitis    ED Course         Critical Care Time  Procedures

## 2022-01-30 NOTE — DISCHARGE INSTRUCTIONS
Please take your antibiotics as directed    Follow-up with family doctor next week without fail for recheck of your leg    Please return worsening symptoms questions comes or concerns

## 2022-01-30 NOTE — ED NOTES
Patient escorted to bathroom with PCT assistance  No incident, requesting time for vascular - Gaby Vascular Tech paged via [facility] Candy Merrill, TOMEKA  01/30/22 4740

## 2022-01-31 ENCOUNTER — TELEPHONE (OUTPATIENT)
Dept: INTERNAL MEDICINE CLINIC | Facility: CLINIC | Age: 87
End: 2022-01-31

## 2022-01-31 PROCEDURE — 93971 EXTREMITY STUDY: CPT | Performed by: SURGERY

## 2022-02-02 ENCOUNTER — TELEPHONE (OUTPATIENT)
Dept: INTERNAL MEDICINE CLINIC | Facility: CLINIC | Age: 87
End: 2022-02-02

## 2022-02-02 NOTE — TELEPHONE ENCOUNTER
Patient called stating that she was in the ER on Sunday for cellulitis  She has taken the Keflex since Sunday, which was prescribed to her  She states that her left leg (foot/ankle) is a little better  Some swelling yet but no redness or fever  Today she just has some discomfort in the crease of her knee in the back  Her leg is not any worse that on Sunday  She states that her family members of physicians wanted her to check in with us  I stated to her that we could certainly see her in the office to make sure that the leg is okay  She decided to give it one more day  She will call me in the morning and give me an update  If she would like to come into the office I told her we could accommodate her tomorrow  She was okay with this

## 2022-02-03 ENCOUNTER — TELEPHONE (OUTPATIENT)
Dept: INTERNAL MEDICINE CLINIC | Facility: CLINIC | Age: 87
End: 2022-02-03

## 2022-02-03 NOTE — TELEPHONE ENCOUNTER
Patient called stating that her leg is looking better today  No need for an appointment    Has one scheduled for 2/10/22, she will call sooner if needed

## 2022-02-10 ENCOUNTER — OFFICE VISIT (OUTPATIENT)
Dept: INTERNAL MEDICINE CLINIC | Facility: CLINIC | Age: 87
End: 2022-02-10
Payer: MEDICARE

## 2022-02-10 VITALS
BODY MASS INDEX: 23.46 KG/M2 | SYSTOLIC BLOOD PRESSURE: 140 MMHG | DIASTOLIC BLOOD PRESSURE: 72 MMHG | HEART RATE: 78 BPM | RESPIRATION RATE: 20 BRPM | WEIGHT: 132.4 LBS | OXYGEN SATURATION: 98 % | TEMPERATURE: 97.3 F | HEIGHT: 63 IN

## 2022-02-10 DIAGNOSIS — M79.89 LEFT LEG SWELLING: Primary | ICD-10-CM

## 2022-02-10 PROCEDURE — 99213 OFFICE O/P EST LOW 20 MIN: CPT | Performed by: INTERNAL MEDICINE

## 2022-02-10 NOTE — PROGRESS NOTES
INTERNAL MEDICINE OFFICE VISIT  [facility] Internal Medicine- [place]    NAME: Pantera Mercer  AGE: 80 y o  SEX: female    DATE OF ENCOUNTER: 2/10/2022    Assessment and Plan     1  Left leg swelling  -history as below  She was treated in the ER for cellulitis, completed 10 day course of Keflex  She does have some residual edema of the left lower leg but I do not see any evidence of active infection  If there was infection, I do think this is resolved at this time  -also discussed possibility of ruptured Baker's cyst as etiology of her swelling  -DVT ruled out as below    Plan:  -recommended she continue to monitor swelling for now  If she develops worsening of her swelling, pain, development of new lesions or discharge she is to let me know  -I recommend she participate in activity as tolerated, keep legs elevated at rest, can also consider use of compression stocking to help with the swelling          No orders of the defined types were placed in this encounter  Chief Complaint     Chief Complaint   Patient presents with    Follow-up     ER f/u       History of Present Illness     Prudence comes in today for ER follow-up    She has a medical history of diastolic CHF, permanent atrial fibrillation, hypertension, hyperlipidemia, PVCs    She presented to the ED on 01/30 with left calf pain and swelling  Was seen in urgent care initially and referred to the ED to rule out DVT  Venous duplex was completed which did not show any evidence of DVT  She was diagnosed with cellulitis and discharged on course of Keflex  She comes in today for re-evaluation of her left leg    She states that her leg swelling was preceded by anterior left knee pain of 1 week  She does not recall any injuries to the left knee    Her swelling essentially occurred overnight and this prompted trip to the urgent care as above    She does feel that swelling and pain have somewhat improved    The following portions of the patient's history were reviewed and updated as appropriate: allergies, current medications, past family history, past medical history, past social history, past surgical history and problem list     Review of Systems     10 point ROS negative except per HPI    Active Problem List     Patient Active Problem List   Diagnosis    Anemia    Hyperlipidemia    Hypertension    Permanent atrial fibrillation ([facility] [address] )    Premature ventricular contraction    Vitamin D deficiency    Esophageal reflux    Ulnar nerve entrapment at elbow    Numbness of hand    Lower extremity edema    Chronic diastolic (congestive) heart failure ([facility] [address] )       Objective     There were no vitals taken for this visit  Physical Exam  Constitutional:       Appearance: Normal appearance  She is not ill-appearing  HENT:      Head: Normocephalic and atraumatic  Eyes:      General: No scleral icterus  Right eye: No discharge  Left eye: No discharge  Cardiovascular:      Rate and Rhythm: Normal rate and regular rhythm  Heart sounds: No murmur heard  No friction rub  Pulmonary:      Effort: Pulmonary effort is normal       Breath sounds: Normal breath sounds  No wheezing or rales  Abdominal:      General: Abdomen is flat  There is no distension  Palpations: Abdomen is soft  Tenderness: There is no abdominal tenderness  Musculoskeletal:         General: Swelling (1+ pitting edema lower extremity, minimal erythema present at the base of the ankle  No open lesions or discharge appreciated) present  No tenderness  Skin:     General: Skin is warm and dry  Findings: No erythema  Neurological:      Mental Status: She is alert  Mental status is at baseline  Motor: No weakness  Psychiatric:         Mood and Affect: Mood normal          Behavior: Behavior normal          Pertinent Laboratory/Diagnostic Studies:  No results found      Images and diagnostics reviewed     Current Medications     Current Outpatient Medications:     apixaban (Eliquis) 2 5 mg, Take 1 tablet (2 5 mg total) by mouth 2 (two) times a day, Disp: 180 tablet, Rfl: 3    bisoprolol (ZEBETA) 5 mg tablet, Take 0 5 tablets (2 5 mg total) by mouth see administration instructions One half to one tablet daily as directed by physician (Patient taking differently: Take 2 5 mg by mouth as needed One half to one tablet daily as directed by physician ), Disp: 90 tablet, Rfl: 3    digoxin (LANOXIN) 0 125 mg tablet, TAKE 1 TABLET DAILY, Disp: 90 tablet, Rfl: 3    fluticasone-salmeterol (ADVAIR, WIXELA) 250-50 mcg/dose inhaler, USE 1 INHALATION TWO TIMES A DAY (Patient not taking: Reported on 1/10/2022), Disp: 60 each, Rfl: 11    furosemide (LASIX) 20 mg tablet, Take 0 5 tablets (10 mg total) by mouth 2 (two) times a week, Disp: 12 tablet, Rfl: 3    Multiple Vitamins-Minerals (PRESERVISION AREDS 2+MULTI VIT) CAPS, Take 1 capsule by mouth 2 (two) times a day , Disp: , Rfl:     omeprazole (PriLOSEC) 40 MG capsule, TAKE 1 CAPSULE DAILY (Patient not taking: Reported on 1/10/2022), Disp: 90 capsule, Rfl: 4    pravastatin (PRAVACHOL) 20 mg tablet, TAKE 1 TABLET DAILY, Disp: 90 tablet, Rfl: 3    Health Maintenance     Health Maintenance   Topic Date Due    Pneumococcal Vaccine: 65+ Years (1 of 2 - PPSV23) 03/13/2019    Medicare Annual Wellness Visit (AWV)  03/27/2019    Fall Risk  10/02/2020    DTaP,Tdap,and Td Vaccines (2 - Tdap) 05/11/2021    COVID-19 Vaccine (3 - Booster for Moderna series) 08/04/2021    Depression Screening  11/16/2021    BMI: Adult  01/30/2023    Influenza Vaccine  Completed    HIB Vaccine  Aged Out    Hepatitis B Vaccine  Aged Out    IPV Vaccine  Aged Out    Hepatitis A Vaccine  Aged Out    Meningococcal ACWY Vaccine  Aged Out    HPV Vaccine  Aged Out     Immunization History   Administered Date(s) Administered    COVID-19 MODERNA VACC 0 5 ML IM 02/06/2021, 03/04/2021    DTaP 5 05/11/2011    H1N1, All Formulations 01/08/2010  INFLUENZA 11/08/2018    Influenza Split High Dose Preservative Free IM 10/22/2012, 11/19/2013, 10/15/2014, 11/18/2015, 10/20/2016, 10/26/2017    Influenza, high dose seasonal 0 7 mL 11/08/2018, 10/02/2019, 10/02/2020, 10/12/2021    Influenza, seasonal, injectable 10/27/2011    Pneumococcal Conjugate 13-Valent 01/16/2019       HERMELINDA Vasquez  Internal Medicine [place] [id number], [facility] [id number]  [place], [address] [facility]  Office: [phone]

## 2022-02-18 ENCOUNTER — APPOINTMENT (EMERGENCY)
Dept: RADIOLOGY | Facility: HOSPITAL | Age: 87
End: 2022-02-18
Payer: MEDICARE

## 2022-02-18 ENCOUNTER — APPOINTMENT (EMERGENCY)
Dept: CT IMAGING | Facility: HOSPITAL | Age: 87
End: 2022-02-18
Payer: MEDICARE

## 2022-02-18 ENCOUNTER — TELEPHONE (OUTPATIENT)
Dept: INTERNAL MEDICINE CLINIC | Facility: CLINIC | Age: 87
End: 2022-02-18

## 2022-02-18 ENCOUNTER — HOSPITAL ENCOUNTER (EMERGENCY)
Facility: HOSPITAL | Age: 87
Discharge: HOME | End: 2022-02-18
Attending: EMERGENCY MEDICINE | Admitting: EMERGENCY MEDICINE
Payer: MEDICARE

## 2022-02-18 VITALS
DIASTOLIC BLOOD PRESSURE: 87 MMHG | TEMPERATURE: 98.9 F | BODY MASS INDEX: 23.9 KG/M2 | OXYGEN SATURATION: 96 % | HEART RATE: 90 BPM | SYSTOLIC BLOOD PRESSURE: 162 MMHG | WEIGHT: 134.92 LBS | RESPIRATION RATE: 18 BRPM
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
## 2022-02-18 DIAGNOSIS — M54.2 NECK PAIN: ICD-10-CM

## 2022-02-18 LAB
ALBUMIN SERPL BCP-MCNC: 3.8 G/DL (ref 3.5–5)
ALP SERPL-CCNC: 170 U/L (ref 46–116)
ALT SERPL W P-5'-P-CCNC: 28 U/L (ref 12–78)
ANION GAP SERPL CALCULATED.3IONS-SCNC: 11 MMOL/L (ref 4–13)
AST SERPL W P-5'-P-CCNC: 28 U/L (ref 5–45)
ATRIAL RATE: 241 BPM
BASOPHILS # BLD AUTO: 0.06 THOUSANDS/ΜL (ref 0–0.1)
BASOPHILS NFR BLD AUTO: 1 % (ref 0–1)
BILIRUB SERPL-MCNC: 0.57 MG/DL (ref 0.2–1)
BUN SERPL-MCNC: 22 MG/DL (ref 5–25)
CALCIUM SERPL-MCNC: 8.6 MG/DL (ref 8.3–10.1)
CHLORIDE SERPL-SCNC: 106 MMOL/L (ref 100–108)
CO2 SERPL-SCNC: 26 MMOL/L (ref 21–32)
CREAT SERPL-MCNC: 0.77 MG/DL (ref 0.6–1.3)
DIGOXIN SERPL-MCNC: 0.5 NG/ML (ref 0.8–2)
EOSINOPHIL # BLD AUTO: 0.08 THOUSAND/ΜL (ref 0–0.61)
EOSINOPHIL NFR BLD AUTO: 1 % (ref 0–6)
ERYTHROCYTE [DISTWIDTH] IN BLOOD BY AUTOMATED COUNT: 14 % (ref 11.6–15.1)
GFR SERPL CREATININE-BSD FRML MDRD: 67 ML/MIN/1.73SQ M
GLUCOSE SERPL-MCNC: 101 MG/DL (ref 65–140)
HCT VFR BLD AUTO: 41.7 % (ref 34.8–46.1)
HGB BLD-MCNC: 13.1 G/DL (ref 11.5–15.4)
IMM GRANULOCYTES # BLD AUTO: 0.03 THOUSAND/UL (ref 0–0.2)
IMM GRANULOCYTES NFR BLD AUTO: 0 % (ref 0–2)
LYMPHOCYTES # BLD AUTO: 1.02 THOUSANDS/ΜL (ref 0.6–4.47)
LYMPHOCYTES NFR BLD AUTO: 12 % (ref 14–44)
MCH RBC QN AUTO: 29.6 PG (ref 26.8–34.3)
MCHC RBC AUTO-ENTMCNC: 31.4 G/DL (ref 31.4–37.4)
MCV RBC AUTO: 94 FL (ref 82–98)
MONOCYTES # BLD AUTO: 0.63 THOUSAND/ΜL (ref 0.17–1.22)
MONOCYTES NFR BLD AUTO: 7 % (ref 4–12)
NEUTROPHILS # BLD AUTO: 7.08 THOUSANDS/ΜL (ref 1.85–7.62)
NEUTS SEG NFR BLD AUTO: 79 % (ref 43–75)
NRBC BLD AUTO-RTO: 0 /100 WBCS
PLATELET # BLD AUTO: 221 THOUSANDS/UL (ref 149–390)
PMV BLD AUTO: 10 FL (ref 8.9–12.7)
POTASSIUM SERPL-SCNC: 4 MMOL/L (ref 3.5–5.3)
PROT SERPL-MCNC: 7 G/DL (ref 6.4–8.2)
QRS AXIS: 80 DEGREES
QRSD INTERVAL: 72 MS
QT INTERVAL: 298 MS
QTC INTERVAL: 406 MS
RBC # BLD AUTO: 4.43 MILLION/UL (ref 3.81–5.12)
SODIUM SERPL-SCNC: 143 MMOL/L (ref 136–145)
T WAVE AXIS: 19 DEGREES
VENTRICULAR RATE: 112 BPM
WBC # BLD AUTO: 8.9 THOUSAND/UL (ref 4.31–10.16)

## 2022-02-18 PROCEDURE — G1004 CDSM NDSC: HCPCS

## 2022-02-18 PROCEDURE — 80162 ASSAY OF DIGOXIN TOTAL: CPT | Performed by: EMERGENCY MEDICINE

## 2022-02-18 PROCEDURE — 93010 ELECTROCARDIOGRAM REPORT: CPT | Performed by: INTERNAL MEDICINE

## 2022-02-18 PROCEDURE — 36415 COLL VENOUS BLD VENIPUNCTURE: CPT | Performed by: EMERGENCY MEDICINE

## 2022-02-18 PROCEDURE — 72170 X-RAY EXAM OF PELVIS: CPT

## 2022-02-18 PROCEDURE — 99285 EMERGENCY DEPT VISIT HI MDM: CPT | Performed by: EMERGENCY MEDICINE

## 2022-02-18 PROCEDURE — 72125 CT NECK SPINE W/O DYE: CPT

## 2022-02-18 PROCEDURE — 70450 CT HEAD/BRAIN W/O DYE: CPT

## 2022-02-18 PROCEDURE — 85025 COMPLETE CBC W/AUTO DIFF WBC: CPT | Performed by: EMERGENCY MEDICINE

## 2022-02-18 PROCEDURE — 80053 COMPREHEN METABOLIC PANEL: CPT | Performed by: EMERGENCY MEDICINE

## 2022-02-18 PROCEDURE — 72220 X-RAY EXAM SACRUM TAILBONE: CPT

## 2022-02-18 PROCEDURE — 93005 ELECTROCARDIOGRAM TRACING: CPT

## 2022-02-18 PROCEDURE — 99284 EMERGENCY DEPT VISIT MOD MDM: CPT

## 2022-02-18 RX ORDER — ACETAMINOPHEN 325 MG/1
975 TABLET ORAL ONCE
Status: COMPLETED | OUTPATIENT
Start: 2022-02-18 | End: 2022-02-18

## 2022-02-18 RX ADMIN — ACETAMINOPHEN 975 MG: 325 TABLET, FILM COATED ORAL at 12:38

## 2022-02-18 NOTE — ED ATTENDING ATTESTATION
2/18/2022  IMarlene, , saw and evaluated the patient  I have discussed the patient with the resident/non-physician practitioner and agree with the resident's/non-physician practitioner's findings, Plan of Care, and MDM as documented in the resident's/non-physician practitioner's note, except where noted  All available labs and Radiology studies were reviewed  I was present for key portions of any procedure(s) performed by the resident/non-physician practitioner and I was immediately available to provide assistance  At this point I agree with the current assessment done in the Emergency Department  I have conducted an independent evaluation of this patient a history and physical is as follows:89y F w/ afib, on eliquis here for evaluation after fall  Reports she lost her footing and fell backward  No LOC  C/o neck pain w/o radiation  Denies any numbness or weakness, denies back pain, denies cp/pressure, no sob/arevalo, no abd pain, no n/v/d, no other c/o  Exam WNWD nad, mmm, neck supple, resp non-labored, cv regular rate, abd nd, ext no cce, skin dry, neuro non-focal, normal mood  A/P Fall on Eliquis - will ck cth c-spine   If neg, home w/ return precautions      ED Course     Labs Reviewed   DIGOXIN LEVEL - Abnormal       Result Value Ref Range Status    Digoxin Lvl 0 5 (*) 0 8 - 2 0 ng/mL Final   CBC AND DIFFERENTIAL - Abnormal    WBC 8 90  4 31 - 10 16 Thousand/uL Final    RBC 4 43  3 81 - 5 12 Million/uL Final    Hemoglobin 13 1  11 5 - 15 4 g/dL Final    Hematocrit 41 7  34 8 - 46 1 % Final    MCV 94  82 - 98 fL Final    MCH 29 6  26 8 - 34 3 pg Final    MCHC 31 4  31 4 - 37 4 g/dL Final    RDW 14 0  11 6 - 15 1 % Final    MPV 10 0  8 9 - 12 7 fL Final    Platelets 631  764 - 390 Thousands/uL Final    nRBC 0  /100 WBCs Final    Neutrophils Relative 79 (*) 43 - 75 % Final    Immat GRANS % 0  0 - 2 % Final    Lymphocytes Relative 12 (*) 14 - 44 % Final    Monocytes Relative 7  4 - 12 % Final Eosinophils Relative 1  0 - 6 % Final    Basophils Relative 1  0 - 1 % Final    Neutrophils Absolute 7 08  1 85 - 7 62 Thousands/µL Final    Immature Grans Absolute 0 03  0 00 - 0 20 Thousand/uL Final    Lymphocytes Absolute 1 02  0 60 - 4 47 Thousands/µL Final    Monocytes Absolute 0 63  0 17 - 1 22 Thousand/µL Final    Eosinophils Absolute 0 08  0 00 - 0 61 Thousand/µL Final    Basophils Absolute 0 06  0 00 - 0 10 Thousands/µL Final   COMPREHENSIVE METABOLIC PANEL - Abnormal    Sodium 143  136 - 145 mmol/L Final    Potassium 4 0  3 5 - 5 3 mmol/L Final    Chloride 106  100 - 108 mmol/L Final    CO2 26  21 - 32 mmol/L Final    ANION GAP 11  4 - 13 mmol/L Final    BUN 22  5 - 25 mg/dL Final    Creatinine 0 77  0 60 - 1 30 mg/dL Final    Comment: Standardized to IDMS reference method    Glucose 101  65 - 140 mg/dL Final    Comment: If the patient is fasting, the ADA then defines impaired fasting glucose as > 100 mg/dL and diabetes as > or equal to 123 mg/dL  Specimen collection should occur prior to Sulfasalazine administration due to the potential for falsely depressed results  Specimen collection should occur prior to Sulfapyridine administration due to the potential for falsely elevated results  Calcium 8 6  8 3 - 10 1 mg/dL Final    AST 28  5 - 45 U/L Final    Comment: Specimen collection should occur prior to Sulfasalazine administration due to the potential for falsely depressed results  ALT 28  12 - 78 U/L Final    Comment: Specimen collection should occur prior to Sulfasalazine administration due to the potential for falsely depressed results  Alkaline Phosphatase 170 (*) 46 - 116 U/L Final    Total Protein 7 0  6 4 - 8 2 g/dL Final    Albumin 3 8  3 5 - 5 0 g/dL Final    Total Bilirubin 0 57  0 20 - 1 00 mg/dL Final    Comment: Use of this assay is not recommended for patients undergoing treatment with eltrombopag due to the potential for falsely elevated results      eGFR 67  ml/min/1 73sq m Final    Narrative:     National Kidney Disease Foundation guidelines for Chronic Kidney Disease (CKD):     Stage 1 with normal or high GFR (GFR > 90 mL/min/1 73 square meters)    Stage 2 Mild CKD (GFR = 60-89 mL/min/1 73 square meters)    Stage 3A Moderate CKD (GFR = 45-59 mL/min/1 73 square meters)    Stage 3B Moderate CKD (GFR = 30-44 mL/min/1 73 square meters)    Stage 4 Severe CKD (GFR = 15-29 mL/min/1 73 square meters)    Stage 5 End Stage CKD (GFR <15 mL/min/1 73 square meters)  Note: GFR calculation is accurate only with a steady state creatinine     XR pelvis ap only 1 or 2 vw   Final Result      No acute osseous abnormality  Workstation performed: [id number]         XR sacrum and coccyx   Final Result      No acute osseous abnormality  Workstation performed: [id number]         CT spine cervical without contrast   Final Result      No cervical spine fracture or traumatic malalignment  Multilevel cervical degenerative changes  Workstation performed: [id number]         CT head without contrast   Final Result      No acute intracranial process  No skull fracture  Chronic microangiopathy  Workstation performed: [id number]               Critical Care Time  Procedures    1  Fall, initial encounter     2  Neck pain       Time reflects when diagnosis was documented in both MDM as applicable and the Disposition within this note     Time User Action Codes Description Comment    2/18/2022  1:50 PM Miguel Briggs Add [W19  XXXA] Fall, initial encounter     2/18/2022  1:50 PM Miguel Briggs Add [M54 2] Neck pain       ED Disposition     ED Disposition Condition Date/Time Comment    Discharge Good Fri Feb 18, 2022  1:49 PM Prudence Gely Raya discharge to home/self care              Follow-up Information     Follow up With Specialties Details Why Contact Info Additional Information    Ritesh Walls,  Internal Medicine Schedule an appointment as soon as possible for a visit in 1 week For reevaluation as we discussed   111 [facility]  [address] [phone] 849 [facility] Emergency Department Emergency Medicine Go to  As needed [place] [phone]  112 [facility] Emergency Department, [address] [place], [place] [address]kristine Medeiros  , [place], [place], [address]

## 2022-02-18 NOTE — DISCHARGE INSTRUCTIONS
Your digoxin level was 0 5 today, which is slightly low  Please discuss this with your doctor  If possible, have a family member stay with you tonight or stay with a family member

## 2022-02-18 NOTE — TELEPHONE ENCOUNTER
Patient called stated she fell this morning in her closet and hit the back of her head and its bothersome  I advised she is to go straight to the ER and she either needs some one to take her or call 911 for EMS transport  Patient understood

## 2022-02-20 NOTE — ED PROVIDER NOTES
History  Chief Complaint   Patient presents with    Fall     Pt brought in via ambulance states that she was walking and lost her footing, fell backward and struck head on a metal bishnu  negative LOC  is on eliquis  no open areas or bleeding  79 yo F on eliquis and digoxin for afib, presenting from home after sustaining a fall in her closet  She states that she fell backwards which has been happening to her more frequently, feels like her balance isn't want it used to be  She unfortunately hit the back of her head on one of the metal bars in her closet  There was no LOC  Was able to get up immediately  Reports she had some neck pain afterwards which prompted her to call her children who told her to call 911 given that she is on eliquis and high risk for bleeding  She called and was placed in a collar by EMS due to neck pain  Did not take anything for the pain  Denies any back pain  Did fall onto her buttocks as well so feels a little sore there, but this is less severe than her neck  No numbness/tingling/weakness anywhere  Has been taking her meds as prescribed  She did not have any prodrome prior to falling  No lightheadedness/dizziness, chest pain, sob  Had been feeling otherwise well this morning  Was able to take a shower prior to the fall without any difficulty  Does currently live alone at home but has multiple family members who she is close with that intermittently check in on her either in person or via phone  She feels safe living at home  History provided by:  Patient   used: No    Fall  Associated symptoms: neck pain    Associated symptoms: no abdominal pain, no back pain, no chest pain, no headaches, no nausea and no vomiting        Prior to Admission Medications   Prescriptions Last Dose Informant Patient Reported? Taking?    Multiple Vitamins-Minerals (PRESERVISION AREDS 2+MULTI VIT) CAPS Not Taking at Unknown time Self Yes No   Sig: Take 1 capsule by mouth 2 (two) times a day    Patient not taking: Reported on 2/18/2022    apixaban (Eliquis) 2 5 mg  Self No Yes   Sig: Take 1 tablet (2 5 mg total) by mouth 2 (two) times a day   bisoprolol (ZEBETA) 5 mg tablet  Self No Yes   Sig: Take 0 5 tablets (2 5 mg total) by mouth see administration instructions One half to one tablet daily as directed by physician   Patient taking differently: Take 2 5 mg by mouth as needed One half to one tablet daily as directed by physician    digoxin (LANOXIN) 0 125 mg tablet  Self No Yes   Sig: TAKE 1 TABLET DAILY   fluticasone-salmeterol (Eliud Gutting) 250-50 mcg/dose inhaler Not Taking at Unknown time Self No No   Sig: USE 1 INHALATION TWO TIMES A DAY   Patient not taking: Reported on 1/10/2022   furosemide (LASIX) 20 mg tablet  Self No Yes   Sig: Take 0 5 tablets (10 mg total) by mouth 2 (two) times a week   omeprazole (PriLOSEC) 40 MG capsule Not Taking at Unknown time Self No No   Sig: TAKE 1 CAPSULE DAILY   Patient not taking: Reported on 1/10/2022   pravastatin (PRAVACHOL) 20 mg tablet  Self No Yes   Sig: TAKE 1 TABLET DAILY      Facility-Administered Medications: None       Past Medical History:   Diagnosis Date    Atrial fibrillation (HCC)     Fracture of ankle     Hyperlipidemia     Hypertension     Macular degeneration     Osteoarthritis     Vitamin D deficiency        Past Surgical History:   Procedure Laterality Date    APPENDECTOMY      PERINEAL  PELVIC RECONSTRUCTION SURGERY         Family History   Problem Relation Age of Onset    Supraventricular tachycardia Family      I have reviewed and agree with the history as documented  E-Cigarette/Vaping     E-Cigarette/Vaping Substances     Social History     Tobacco Use    Smoking status: Never Smoker    Smokeless tobacco: Never Used   Substance Use Topics    Alcohol use: No     Comment: Social drinker per [employer]    Drug use: No        Review of Systems   Constitutional: Negative for chills, fatigue and fever     HENT: Negative for sore throat  Eyes: Negative for visual disturbance  Respiratory: Negative for cough and shortness of breath  Cardiovascular: Negative for chest pain and palpitations  Gastrointestinal: Negative for abdominal pain, diarrhea, nausea and vomiting  Genitourinary: Negative for difficulty urinating and dysuria  Musculoskeletal: Positive for neck pain  Negative for arthralgias and back pain  Skin: Negative for pallor and rash  Neurological: Negative for syncope, weakness and headaches  Psychiatric/Behavioral: Negative for confusion  The patient is not nervous/anxious  All other systems reviewed and are negative  Physical Exam  ED Triage Vitals   Temperature Pulse Respirations Blood Pressure SpO2   02/18/22 1135 02/18/22 1135 02/18/22 1135 02/18/22 1135 02/18/22 1135   98 9 °F (37 2 °C) (!) 106 18 (!) 190/88 97 %      Temp Source Heart Rate Source Patient Position - Orthostatic VS BP Location FiO2 (%)   02/18/22 1135 02/18/22 1135 02/18/22 1221 02/18/22 1221 --   Oral Monitor Lying Left arm       Pain Score       02/18/22 1135       10 - Worst Possible Pain             Orthostatic Vital Signs  Vitals:    02/18/22 1135 02/18/22 1221 02/18/22 1315   BP: (!) 190/88 (!) 190/93 162/87   Pulse: (!) 106 98 90   Patient Position - Orthostatic VS:  Lying        Physical Exam  Vitals and nursing note reviewed  Constitutional:       General: She is not in acute distress  Appearance: Normal appearance  She is not ill-appearing or diaphoretic  Comments: Extremely well appearing, appears younger than stated age  Cervical collar in place  HENT:      Head: Normocephalic and atraumatic        Comments: No cephalohematoma or lacerations present     Right Ear: Tympanic membrane, ear canal and external ear normal       Left Ear: Tympanic membrane, ear canal and external ear normal       Nose: Nose normal       Mouth/Throat:      Mouth: Mucous membranes are moist    Eyes:      Extraocular Movements: Extraocular movements intact  Conjunctiva/sclera: Conjunctivae normal       Pupils: Pupils are equal, round, and reactive to light  Neck:      Comments: After removal of cervical collar post CT scan, patient has no cervical tenderness to palpation and complaints of no pain  Normal active ROM in all directions  Cardiovascular:      Rate and Rhythm: Normal rate  Rhythm irregular  Heart sounds: No murmur heard  Pulmonary:      Effort: Pulmonary effort is normal  No respiratory distress  Breath sounds: Normal breath sounds  Abdominal:      General: There is no distension  Palpations: Abdomen is soft  Tenderness: There is no abdominal tenderness  Musculoskeletal:         General: No swelling, tenderness or deformity  Normal range of motion  Right lower leg: No edema  Left lower leg: No edema  Comments: No thoracic, lumbar, or sacral ttp  No obvious deformities or step offs  No lacerations, ecchymosis, or abrasions  Skin:     General: Skin is warm and dry  Neurological:      General: No focal deficit present  Mental Status: She is alert and oriented to person, place, and time  Cranial Nerves: No cranial nerve deficit  Sensory: No sensory deficit  Motor: No weakness        Gait: Gait normal    Psychiatric:         Mood and Affect: Mood normal          ED Medications  Medications   acetaminophen (TYLENOL) tablet 975 mg (975 mg Oral Given 2/18/22 1238)       Diagnostic Studies  Results Reviewed     Procedure Component Value Units Date/Time    Digoxin level [[id number]]  (Abnormal) Collected: 02/18/22 1218    Lab Status: Final result Specimen: Blood from Arm, Right Updated: 02/18/22 1345     Digoxin Lvl 0 5 ng/mL     Comprehensive metabolic panel [[id number]]  (Abnormal) Collected: 02/18/22 1218    Lab Status: Final result Specimen: Blood from Arm, Right Updated: 02/18/22 1345     Sodium 143 mmol/L      Potassium 4 0 mmol/L      Chloride 106 mmol/L      CO2 26 mmol/L      ANION GAP 11 mmol/L      BUN 22 mg/dL      Creatinine 0 77 mg/dL      Glucose 101 mg/dL      Calcium 8 6 mg/dL      AST 28 U/L      ALT 28 U/L      Alkaline Phosphatase 170 U/L      Total Protein 7 0 g/dL      Albumin 3 8 g/dL      Total Bilirubin 0 57 mg/dL      eGFR 67 ml/min/1 73sq m     Narrative:      National Kidney Disease Foundation guidelines for Chronic Kidney Disease (CKD):     Stage 1 with normal or high GFR (GFR > 90 mL/min/1 73 square meters)    Stage 2 Mild CKD (GFR = 60-89 mL/min/1 73 square meters)    Stage 3A Moderate CKD (GFR = 45-59 mL/min/1 73 square meters)    Stage 3B Moderate CKD (GFR = 30-44 mL/min/1 73 square meters)    Stage 4 Severe CKD (GFR = 15-29 mL/min/1 73 square meters)    Stage 5 End Stage CKD (GFR <15 mL/min/1 73 square meters)  Note: GFR calculation is accurate only with a steady state creatinine    CBC and differential [[id number]]  (Abnormal) Collected: 02/18/22 1218    Lab Status: Final result Specimen: Blood from Arm, Right Updated: 02/18/22 1227     WBC 8 90 Thousand/uL      RBC 4 43 Million/uL      Hemoglobin 13 1 g/dL      Hematocrit 41 7 %      MCV 94 fL      MCH 29 6 pg      MCHC 31 4 g/dL      RDW 14 0 %      MPV 10 0 fL      Platelets 404 Thousands/uL      nRBC 0 /100 WBCs      Neutrophils Relative 79 %      Immat GRANS % 0 %      Lymphocytes Relative 12 %      Monocytes Relative 7 %      Eosinophils Relative 1 %      Basophils Relative 1 %      Neutrophils Absolute 7 08 Thousands/µL      Immature Grans Absolute 0 03 Thousand/uL      Lymphocytes Absolute 1 02 Thousands/µL      Monocytes Absolute 0 63 Thousand/µL      Eosinophils Absolute 0 08 Thousand/µL      Basophils Absolute 0 06 Thousands/µL                  XR pelvis ap only 1 or 2 vw   Final Result by Ross Sanchez MD (02/18 2758)      No acute osseous abnormality        Workstation performed: [id number]         XR sacrum and coccyx   Final Result by Ross Sanchez MD (02/18 8172) No acute osseous abnormality  Workstation performed: [id number]         CT spine cervical without contrast   Final Result by Tashia Saini MD (02/18 1228)      No cervical spine fracture or traumatic malalignment  Multilevel cervical degenerative changes  Workstation performed: [id number]         CT head without contrast   Final Result by Tashia Saini MD (02/18 1224)      No acute intracranial process  No skull fracture  Chronic microangiopathy  Workstation performed: [id number]               Procedures  ECG 12 Lead Documentation Only    Date/Time: 2/18/2022 1:00 PM  Performed by: Manav Saldaña MD  Authorized by: Manav Saldaña MD     Indications / Diagnosis:  Fall, afib   Patient location:  ED  Previous ECG:     Previous ECG:  Compared to current    Similarity:  Changes noted  Interpretation:     Interpretation: abnormal    Quality:     Tracing quality:  Limited by artifact  Rate:     ECG rate:  112    ECG rate assessment: tachycardic    Rhythm:     Rhythm: atrial fibrillation    Ectopy:     Ectopy: none    QRS:     QRS axis:  Normal  Conduction:     Conduction: normal    ST segments:     ST segments:  Normal  T waves:     T waves: inverted      Inverted:  AVR  Comments:      afib with rvr to 112          ED Course  ED Course as of 02/20/22 1604   Fri Feb 18, 2022   1325 Cervical Collar Clearance: The patient had a CT scan of the cervical spine demonstrating no acute injury  On exam, the patient had no midline point tenderness or paresthesias/numbness/weakness in the extremities  The patient had full range of motion (was then able to flex, extend, and rotate head laterally) without pain  There were no distracting injuries and the patient was not intoxicated  The patient's cervical spine was cleared radiologically and clinically  Cervical collar removed at this time       Manav Saldaña MD  2/18/2022 1:28 PM       46 DIGOXIN LEVEL(!): 0 5                             SBIRT 22yo+      Most Recent Value   SBIRT (22 yo +)    In order to provide better care to our patients, we are screening all of our patients for alcohol and drug use  Would it be okay to ask you these screening questions? Yes Filed at: 02/18/2022 1150   Initial Alcohol Screen: US AUDIT-C     1  How often do you have a drink containing alcohol? 0 Filed at: 02/18/2022 1150   2  How many drinks containing alcohol do you have on a typical day you are drinking? 0 Filed at: 02/18/2022 1155   3a  Male UNDER 65: How often do you have five or more drinks on one occasion? 0 Filed at: 02/18/2022 1158   3b  FEMALE Any Age, or MALE 65+: How often do you have 4 or more drinks on one occassion? 0 Filed at: 02/18/2022 1154   Audit-C Score 0 Filed at: 02/18/2022 1158   ADRCIE: How many times in the past year have you    Used an illegal drug or used a prescription medication for non-medical reasons? Never Filed at: 02/18/2022 1151                MDM  Number of Diagnoses or Management Options  Fall, initial encounter  Neck pain  Diagnosis management comments: 81 yo F presenting after mechanical fall today, on eliquis for afib  Well appearing, only complaint is neck pain  CTH unremarkable, CT c spine also negative  Cervical spine cleared after imaging returned  Patient reports complete resolution of her neck pain and has normal active ROM without any symptoms in upper extremities  XR pelvis and sacrum negative as well, pt without any significant pain there  Labs notable for slightly subtherapeutic digoxin level, instruct pt to call her PCP and let them know on Monday, continue taking her dose as prescribed for now  Daughter present at bedside and will keep an eye on her for the remainder of today and possibly overnight to make sure she is safe at home given her fall today  Ambulatory in the ED         Disposition  Final diagnoses:   Fall, initial encounter   Neck pain Time reflects when diagnosis was documented in both MDM as applicable and the Disposition within this note     Time User Action Codes Description Comment    2/18/2022  1:50 PM Audra Briggs Add [W19  XXXA] Fall, initial encounter     2/18/2022  1:50 PM Audra Briggs Add [M54 2] Neck pain       ED Disposition     ED Disposition Condition Date/Time Comment    Discharge Good Fri Feb 18, 2022  1:49 PM Prudence Beverlehiginio Handy discharge to home/self care  Follow-up Information     Follow up With Specialties Details Why Contact Info Additional Information    Ritesh Mercado,  Internal Medicine Schedule an appointment as soon as possible for a visit in 1 week For reevaluation as we discussed   [address] [address]  6 [facility] Emergency Department Emergency Medicine Go to  As needed [facility] [phone]  [address] [place] [address] Emergency Department, [address] [place] [address], [id number]          Discharge Medication List as of 2/18/2022  1:51 PM      CONTINUE these medications which have NOT CHANGED    Details   apixaban (Eliquis) 2 5 mg Take 1 tablet (2 5 mg total) by mouth 2 (two) times a day, Starting Tue 5/19/2020, Normal      bisoprolol (ZEBETA) 5 mg tablet Take 0 5 tablets (2 5 mg total) by mouth see administration instructions One half to one tablet daily as directed by physician, Starting Wed 11/3/2021, Normal      digoxin (LANOXIN) 0 125 mg tablet TAKE 1 TABLET DAILY, Normal      furosemide (LASIX) 20 mg tablet Take 0 5 tablets (10 mg total) by mouth 2 (two) times a week, Starting Thu 2/25/2021, Normal      pravastatin (PRAVACHOL) 20 mg tablet TAKE 1 TABLET DAILY, Normal      fluticasone-salmeterol (ADVAIR, WIXELA) 250-50 mcg/dose inhaler USE 1 INHALATION TWO TIMES A DAY, Normal      Multiple Vitamins-Minerals (PRESERVISION AREDS 2+MULTI VIT) CAPS Take 1 capsule by mouth 2 (two) times a day , Historical Med      omeprazole (PriLOSEC) 40 MG capsule TAKE 1 CAPSULE DAILY, Normal           No discharge procedures on file  PDMP Review     None           ED Provider  Attending physically available and evaluated Prudence Binta Manzanares I managed the patient along with the ED Attending      Electronically Signed by         Pierre Hernández MD  02/20/22 9511

## 2022-03-06 ENCOUNTER — NURSE TRIAGE (OUTPATIENT)
Dept: OTHER | Facility: OTHER | Age: 87
End: 2022-03-06

## 2022-03-06 NOTE — TELEPHONE ENCOUNTER
Tiger text to on call provider Dr Guadarrama Person regarding patients symptoms  Per on call provider : he will contact patient directly  Reason for Disposition   [1] Thigh, calf, or ankle swelling AND [2] only 1 side    Answer Assessment - Initial Assessment Questions  1  ONSET: "When did the swelling start?" (e g , minutes, hours, days)        Patient noticed leg swelling today , left leg   Report she had this issue in mid February and was put on keflex    2  LOCATION: "What part of the leg is swollen?"  "Are both legs swollen or just one leg?"        Left leg from knee to ankle , denies swelling in foot     3  SEVERITY: "How bad is the swelling?" (e g , localized; mild, moderate, severe)   - Localized - small area of swelling localized to one leg   - MILD pedal edema - swelling limited to foot and ankle, pitting edema < 1/4 inch (6 mm) deep, rest and elevation eliminate most or all swelling   - MODERATE edema - swelling of lower leg to knee, pitting edema > 1/4 inch (6 mm) deep, rest and elevation only partially reduce swelling   - SEVERE edema - swelling extends above knee, facial or hand swelling present         Moderate but it does not depress like edema , she reports it as hard     4  REDNESS: "Does the swelling look red or infected?"        Denies redness, denies any change in color     5  PAIN: "Is the swelling painful to touch?" If Yes, ask: "How painful is it?"   (Scale 1-10; mild, moderate or severe)        States it is uncomfortable NOT painful     6  FEVER: "Do you have a fever?" If Yes, ask: "What is it, how was it measured, and when did it start?"         Denies     7   CAUSE: "What do you think is causing the leg swelling?"        Reports his occurred a few weeks ago and they thought it was infection so she was put on keflex and improved with keflex    Denies calf pain   Reports she is on anticoagulation  Reports she can feel her foot/leg and move her foot and leg and does not have pain or calf pain with that     Denies chest pain , denies shortness of breath    Reports  fall 2/18 and eval in ED    Denies any recent falls, denies injury to leg    Protocols used: LEG SWELLING AND EDEMA-ADULT-AH

## 2022-03-06 NOTE — TELEPHONE ENCOUNTER
Regarding: swollen leg   ----- Message from Khalif Lucas sent at 3/6/2022  1:02 PM EST -----  " My leg is swollen and I was recently put on keflex "

## 2022-03-07 ENCOUNTER — OFFICE VISIT (OUTPATIENT)
Dept: INTERNAL MEDICINE CLINIC | Facility: CLINIC | Age: 87
End: 2022-03-07
Payer: MEDICARE

## 2022-03-07 VITALS
BODY MASS INDEX: 23.21 KG/M2 | TEMPERATURE: 97.4 F | HEIGHT: 63 IN | WEIGHT: 131 LBS | SYSTOLIC BLOOD PRESSURE: 152 MMHG | DIASTOLIC BLOOD PRESSURE: 70 MMHG

## 2022-03-07 DIAGNOSIS — M79.89 LEFT LEG SWELLING: ICD-10-CM
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
## 2022-03-07 DIAGNOSIS — M54.2 NECK PAIN: Primary | ICD-10-CM

## 2022-03-07 PROCEDURE — 99214 OFFICE O/P EST MOD 30 MIN: CPT | Performed by: INTERNAL MEDICINE

## 2022-03-07 RX ORDER — LIDOCAINE 50 MG/G
1 PATCH TOPICAL DAILY
Qty: 30 PATCH | Refills: 0 | Status: SHIPPED | OUTPATIENT
Start: 2022-03-07 | End: 2022-08-10

## 2022-03-07 NOTE — PROGRESS NOTES
INTERNAL MEDICINE OFFICE VISIT  [facility]- [place]    NAME: Wade Marinelli  AGE: 80 y o  SEX: female    DATE OF ENCOUNTER: 3/7/2022    Assessment and Plan     1  Neck pain  -her neuro exam is unremarkable today  CT imaging unremarkable in the ED as below  Suspect pain is musculoskeletal in nature related to strain/sprain  -recommend she continue with Tylenol as needed  Can also apply lidocaine patches, heating pad as needed    - lidocaine (Lidoderm) 5 %; Apply 1 patch topically daily Remove & Discard patch within 12 hours or as directed by MD  Dispense: 30 patch; Refill: 0    2  Left leg swelling  -she previously had an episode of left calf pain and swelling towards the end of January  Was seen in the ED with venous duplex showing no evidence of DVT  Diagnosed with cellulitis and completed course of Keflex  -symptoms initially improved  yesterday she had worsening swelling of her left leg which is again improved somewhat today  No significant discomfort on exam  Does have slightly asymmetric edema left greater than right  -she notes she did have a vein ligation in her 30s but no other procedures to her left leg or groin  -Advised her to continue to monitor for now, continue with activity as tolerated, keeping legs elevated at rest, compression stockings  If this issue continues to recur we will consider further workup      3  Fall, subsequent encounter  -mechanical in nature as below  She is on Eliquis  No significant bruising noted on exam   Unremarkable trauma workup in the ER               No orders of the defined types were placed in this encounter  Chief Complaint     Chief Complaint   Patient presents with    Leg Swelling     left    Neck Pain     fell 2 weeks ago       History of Present Illness     Prudence comes in today for same-day appointment for neck pain after fall and recurrent left leg swelling    Medical history of diastolic CHF, permanent atrial fibrillation, hypertension, hyperlipidemia, PVCs    She sustained a fall on 02/18  She lost her balance and fell backward in her closet striking her neck in the back of her head on 1 of the metal bars in the closet  Follow appears to have been mechanical in nature, no significant prodromal symptoms and no loss of consciousness  Presented to the ER  Had x-rays of the pelvis, sacrum, coccyx which were unremarkable  CT of the head and spine were also unremarkable for acute process    She has had ongoing pain in the mid cervical area  Nonradiating  Denies any numbness, tingling, paresthesias of the upper extremities  No significant exacerbating or alleviating factors  Has tried Tylenol as needed without much relief      The following portions of the patient's history were reviewed and updated as appropriate: allergies, current medications, past family history, past medical history, past social history, past surgical history and problem list     Review of Systems     10 point ROS negative except per HPI    Active Problem List     Patient Active Problem List   Diagnosis    Anemia    Hyperlipidemia    Hypertension    Permanent atrial fibrillation (HCC)    Premature ventricular contraction    Vitamin D deficiency    Esophageal reflux    Ulnar nerve entrapment at elbow    Numbness of hand    Lower extremity edema    Chronic diastolic (congestive) heart failure (HCC)       Objective     /70   Temp (!) 97 4 °F (36 3 °C)   Ht 5' 3" (1 6 m)   Wt 59 4 kg (131 lb)   BMI 23 21 kg/m²     Physical Exam  Constitutional:       Appearance: Normal appearance  She is not ill-appearing  HENT:      Head: Normocephalic and atraumatic  Eyes:      General: No scleral icterus  Right eye: No discharge  Left eye: No discharge  Cardiovascular:      Rate and Rhythm: Normal rate and regular rhythm  Heart sounds: No murmur heard  No friction rub     Pulmonary:      Effort: Pulmonary effort is normal  Breath sounds: Normal breath sounds  No wheezing or rales  Abdominal:      General: Abdomen is flat  There is no distension  Palpations: Abdomen is soft  Tenderness: There is no abdominal tenderness  Musculoskeletal:         General: No swelling or tenderness  Skin:     General: Skin is warm and dry  Findings: No erythema  Neurological:      Mental Status: She is alert and oriented to person, place, and time  Mental status is at baseline  Sensory: No sensory deficit  Motor: No weakness  Comments: 5/5 strength bilateral upper extremities   Psychiatric:         Mood and Affect: Mood normal          Behavior: Behavior normal          Pertinent Laboratory/Diagnostic Studies:  XR sacrum and coccyx    Result Date: 2/18/2022  Impression: No acute osseous abnormality  Workstation performed: [id number]     CT head without contrast    Result Date: 2/18/2022  Impression: No acute intracranial process  No skull fracture  Chronic microangiopathy  Workstation performed: [id number]     CT spine cervical without contrast    Result Date: 2/18/2022  Impression: No cervical spine fracture or traumatic malalignment  Multilevel cervical degenerative changes  Workstation performed: [id number]     XR pelvis ap only 1 or 2 vw    Result Date: 2/18/2022  Impression: No acute osseous abnormality   Workstation performed: [id number]       Images and diagnostics reviewed     Current Medications     Current Outpatient Medications:     apixaban (Eliquis) 2 5 mg, Take 1 tablet (2 5 mg total) by mouth 2 (two) times a day, Disp: 180 tablet, Rfl: 3    bisoprolol (ZEBETA) 5 mg tablet, Take 0 5 tablets (2 5 mg total) by mouth see administration instructions One half to one tablet daily as directed by physician (Patient taking differently: Take 2 5 mg by mouth as needed One half to one tablet daily as directed by physician ), Disp: 90 tablet, Rfl: 3    digoxin (LANOXIN) 0 125 mg tablet, TAKE 1 TABLET DAILY, Disp: 90 tablet, Rfl: 3    furosemide (LASIX) 20 mg tablet, Take 0 5 tablets (10 mg total) by mouth 2 (two) times a week, Disp: 12 tablet, Rfl: 3    pravastatin (PRAVACHOL) 20 mg tablet, TAKE 1 TABLET DAILY, Disp: 90 tablet, Rfl: 3    fluticasone-salmeterol (ADVAIR, WIXELA) 250-50 mcg/dose inhaler, USE 1 INHALATION TWO TIMES A DAY (Patient not taking: Reported on 1/10/2022), Disp: 60 each, Rfl: 11    lidocaine (Lidoderm) 5 %, Apply 1 patch topically daily Remove & Discard patch within 12 hours or as directed by MD, Disp: 30 patch, Rfl: 0    Multiple Vitamins-Minerals (PRESERVISION AREDS 2+MULTI VIT) CAPS, Take 1 capsule by mouth 2 (two) times a day  (Patient not taking: Reported on 3/7/2022 ), Disp: , Rfl:     omeprazole (PriLOSEC) 40 MG capsule, TAKE 1 CAPSULE DAILY (Patient not taking: Reported on 1/10/2022), Disp: 90 capsule, Rfl: 4    Health Maintenance     Health Maintenance   Topic Date Due    Pneumococcal Vaccine: 65+ Years (1 of 2 - PPSV23) 03/13/2019    Medicare Annual Wellness Visit (AWV)  03/27/2019    DTaP,Tdap,and Td Vaccines (2 - Tdap) 05/11/2021    COVID-19 Vaccine (3 - Booster for Moderna series) 08/04/2021    Fall Risk  02/10/2023    Depression Screening  02/10/2023    BMI: Adult  03/07/2023    Influenza Vaccine  Completed    HIB Vaccine  Aged Out    Hepatitis B Vaccine  Aged Out    IPV Vaccine  Aged Out    Hepatitis A Vaccine  Aged Out    Meningococcal ACWY Vaccine  Aged Out    HPV Vaccine  Aged Out     Immunization History   Administered Date(s) Administered    COVID-19 MODERNA VACC 0 5 ML IM 02/06/2021, 03/04/2021    DTaP 5 05/11/2011    H1N1, All Formulations 01/08/2010    INFLUENZA 11/08/2018    Influenza Split High Dose Preservative Free IM 10/22/2012, 11/19/2013, 10/15/2014, 11/18/2015, 10/20/2016, 10/26/2017    Influenza, high dose seasonal 0 7 mL 11/08/2018, 10/02/2019, 10/02/2020, 10/12/2021    Influenza, seasonal, injectable 10/27/2011    Pneumococcal Conjugate 13-Valent 01/16/2019       HERMELINDA Jenkins [facility] - [place]  [address], [facility] [id number]  [address], [address] [facility]  Office: [phone]

## 2022-03-08 DIAGNOSIS — E78.5 HYPERLIPIDEMIA, UNSPECIFIED HYPERLIPIDEMIA TYPE: ICD-10-CM

## 2022-03-08 DIAGNOSIS — I48.91 ATRIAL FIBRILLATION, UNSPECIFIED TYPE (HCC): ICD-10-CM

## 2022-03-09 ENCOUNTER — TELEPHONE (OUTPATIENT)
Dept: INTERNAL MEDICINE CLINIC | Facility: CLINIC | Age: 87
End: 2022-03-09

## 2022-03-09 RX ORDER — DIGOXIN 125 MCG
TABLET ORAL
Qty: 90 TABLET | Refills: 3 | Status: SHIPPED | OUTPATIENT
Start: 2022-03-09

## 2022-03-09 RX ORDER — PRAVASTATIN SODIUM 20 MG
TABLET ORAL
Qty: 90 TABLET | Refills: 3 | Status: SHIPPED | OUTPATIENT
Start: 2022-03-09

## 2022-03-09 NOTE — TELEPHONE ENCOUNTER
Called pt to see how is she doing with her swollen leg and she stated that everything still the same and that she even took lasix to see if it would help with the swelling and she stated that it still the same

## 2022-04-04 DIAGNOSIS — R60.0 LOWER EXTREMITY EDEMA: ICD-10-CM

## 2022-04-04 RX ORDER — FUROSEMIDE 20 MG/1
TABLET ORAL
Qty: 12 TABLET | Refills: 3 | Status: SHIPPED | OUTPATIENT
Start: 2022-04-04

## 2022-04-23 ENCOUNTER — APPOINTMENT (EMERGENCY)
Dept: RADIOLOGY | Facility: HOSPITAL | Age: 87
End: 2022-04-23
Payer: MEDICARE

## 2022-04-23 ENCOUNTER — NURSE TRIAGE (OUTPATIENT)
Dept: OTHER | Facility: OTHER | Age: 87
End: 2022-04-23

## 2022-04-23 ENCOUNTER — HOSPITAL ENCOUNTER (EMERGENCY)
Facility: HOSPITAL | Age: 87
Discharge: HOME | End: 2022-04-23
Attending: EMERGENCY MEDICINE
Payer: MEDICARE

## 2022-04-23 VITALS
SYSTOLIC BLOOD PRESSURE: 162 MMHG | DIASTOLIC BLOOD PRESSURE: 79 MMHG | TEMPERATURE: 97.4 F | RESPIRATION RATE: 16 BRPM | OXYGEN SATURATION: 94 % | HEART RATE: 61 BPM

## 2022-04-23 DIAGNOSIS — I48.91 ATRIAL FIBRILLATION, UNSPECIFIED TYPE (HCC): Primary | ICD-10-CM

## 2022-04-23 DIAGNOSIS — R00.2 PALPITATIONS: ICD-10-CM

## 2022-04-23 LAB
2HR DELTA HS TROPONIN: 1 NG/L
ANION GAP SERPL CALCULATED.3IONS-SCNC: 1 MMOL/L (ref 4–13)
ATRIAL RATE: 72 BPM
BASOPHILS # BLD AUTO: 0.06 THOUSANDS/ΜL (ref 0–0.1)
BASOPHILS NFR BLD AUTO: 1 % (ref 0–1)
BUN SERPL-MCNC: 22 MG/DL (ref 5–25)
CALCIUM SERPL-MCNC: 8.3 MG/DL (ref 8.3–10.1)
CARDIAC TROPONIN I PNL SERPL HS: 5 NG/L
CARDIAC TROPONIN I PNL SERPL HS: 6 NG/L
CHLORIDE SERPL-SCNC: 109 MMOL/L (ref 100–108)
CO2 SERPL-SCNC: 25 MMOL/L (ref 21–32)
CREAT SERPL-MCNC: 0.8 MG/DL (ref 0.6–1.3)
EOSINOPHIL # BLD AUTO: 0.13 THOUSAND/ΜL (ref 0–0.61)
EOSINOPHIL NFR BLD AUTO: 2 % (ref 0–6)
ERYTHROCYTE [DISTWIDTH] IN BLOOD BY AUTOMATED COUNT: 14.6 % (ref 11.6–15.1)
GFR SERPL CREATININE-BSD FRML MDRD: 64 ML/MIN/1.73SQ M
GLUCOSE SERPL-MCNC: 159 MG/DL (ref 65–140)
HCT VFR BLD AUTO: 40.8 % (ref 34.8–46.1)
HGB BLD-MCNC: 12.6 G/DL (ref 11.5–15.4)
IMM GRANULOCYTES # BLD AUTO: 0.03 THOUSAND/UL (ref 0–0.2)
IMM GRANULOCYTES NFR BLD AUTO: 0 % (ref 0–2)
LYMPHOCYTES # BLD AUTO: 0.99 THOUSANDS/ΜL (ref 0.6–4.47)
LYMPHOCYTES NFR BLD AUTO: 15 % (ref 14–44)
MCH RBC QN AUTO: 29.4 PG (ref 26.8–34.3)
MCHC RBC AUTO-ENTMCNC: 30.9 G/DL (ref 31.4–37.4)
MCV RBC AUTO: 95 FL (ref 82–98)
MONOCYTES # BLD AUTO: 0.54 THOUSAND/ΜL (ref 0.17–1.22)
MONOCYTES NFR BLD AUTO: 8 % (ref 4–12)
NEUTROPHILS # BLD AUTO: 4.98 THOUSANDS/ΜL (ref 1.85–7.62)
NEUTS SEG NFR BLD AUTO: 74 % (ref 43–75)
NRBC BLD AUTO-RTO: 0 /100 WBCS
NT-PROBNP SERPL-MCNC: 2142 PG/ML
PLATELET # BLD AUTO: 209 THOUSANDS/UL (ref 149–390)
PMV BLD AUTO: 10.6 FL (ref 8.9–12.7)
POTASSIUM SERPL-SCNC: 3.9 MMOL/L (ref 3.5–5.3)
QRS AXIS: 99 DEGREES
QRSD INTERVAL: 76 MS
QT INTERVAL: 358 MS
QTC INTERVAL: 394 MS
RBC # BLD AUTO: 4.28 MILLION/UL (ref 3.81–5.12)
SODIUM SERPL-SCNC: 135 MMOL/L (ref 136–145)
T WAVE AXIS: 58 DEGREES
VENTRICULAR RATE: 73 BPM
WBC # BLD AUTO: 6.73 THOUSAND/UL (ref 4.31–10.16)

## 2022-04-23 PROCEDURE — 93010 ELECTROCARDIOGRAM REPORT: CPT | Performed by: INTERNAL MEDICINE

## 2022-04-23 PROCEDURE — 83880 ASSAY OF NATRIURETIC PEPTIDE: CPT

## 2022-04-23 PROCEDURE — 71045 X-RAY EXAM CHEST 1 VIEW: CPT

## 2022-04-23 PROCEDURE — 99285 EMERGENCY DEPT VISIT HI MDM: CPT

## 2022-04-23 PROCEDURE — 85025 COMPLETE CBC W/AUTO DIFF WBC: CPT

## 2022-04-23 PROCEDURE — 36415 COLL VENOUS BLD VENIPUNCTURE: CPT

## 2022-04-23 PROCEDURE — 80048 BASIC METABOLIC PNL TOTAL CA: CPT

## 2022-04-23 PROCEDURE — 84484 ASSAY OF TROPONIN QUANT: CPT

## 2022-04-23 PROCEDURE — 93005 ELECTROCARDIOGRAM TRACING: CPT

## 2022-04-23 RX ORDER — SODIUM CHLORIDE 9 MG/ML
3 INJECTION INTRAVENOUS
Status: DISCONTINUED | OUTPATIENT
Start: 2022-04-23 | End: 2022-04-23 | Stop reason: HOSPADM

## 2022-04-23 NOTE — TELEPHONE ENCOUNTER
Answer Assessment - Initial Assessment Questions  1  LOCATION: "Where does it hurt?"        Doesn't hurt it feels weird I never felt this before  Upper Left chest   2  RADIATION: "Does the pain go anywhere else?" (e g , into neck, jaw, arms, back)      no  3  ONSET: "When did the chest pain begin?" (Minutes, hours or days)       This morning  4  PATTERN "Does the pain come and go, or has it been constant since it started?"  "Does it get worse with exertion?"       Comes and goes   5  DURATION: "How long does it last" (e g , seconds, minutes, hours)      Few seconds to minutes   6  SEVERITY: "How bad is the pain?"  (e g , Scale 1-10; mild, moderate, or severe)     - MILD (1-3): doesn't interfere with normal activities      - MODERATE (4-7): interferes with normal activities or awakens from sleep     - SEVERE (8-10): excruciating pain, unable to do any normal activities        Moderate  7  CARDIAC RISK FACTORS: "Do you have any history of heart problems or risk factors for heart disease?" (e g , angina, prior heart attack; diabetes, high blood pressure, high cholesterol, smoker, or strong family history of heart disease)      yes  8  PULMONARY RISK FACTORS: "Do you have any history of lung disease?"  (e g , blood clots in lung, asthma, emphysema, birth control pills)      no  9  CAUSE: "What do you think is causing the chest pain?"      A-Fib  10  OTHER SYMPTOMS: "Do you have any other symptoms?" (e g , dizziness, nausea, vomiting, sweating, fever, difficulty breathing, cough)        Denies  11   PREGNANCY: "Is there any chance you are pregnant?" "When was your last menstrual period?"        N/A    Protocols used: CHEST PAIN-ADULT-

## 2022-04-23 NOTE — ED PROVIDER NOTES
History  Chief Complaint   Patient presents with    Atrial Fibrillation     pt reports a fluttering sensation in chest, hx of a fib     Patient is a 72-year-old female with a past medical history AFib, coming in for complaint of palpitations proximally 6 a m  Today  Patient states that this time palpitations of subsided  Patient denies any chest pain, shortness of breath, states that she does have a history of ankle swelling, but they have not had increase swelling at this time  Patient states that she feels okay, has been compliant with her medications      History provided by:  Patient   used: No    Atrial Fibrillation  Severity:  Mild  Duration:  30 minutes  Associated symptoms: no abdominal pain, no chest pain, no congestion, no cough, no diarrhea, no ear pain, no fatigue, no fever, no headaches, no loss of consciousness, no myalgias, no nausea, no rash, no shortness of breath, no sore throat, no vomiting and no wheezing        Prior to Admission Medications   Prescriptions Last Dose Informant Patient Reported? Taking?    Multiple Vitamins-Minerals (PRESERVISION AREDS 2+MULTI VIT) CAPS  Self Yes No   Sig: Take 1 capsule by mouth 2 (two) times a day    Patient not taking: Reported on 3/7/2022    apixaban (Eliquis) 2 5 mg  Self No No   Sig: Take 1 tablet (2 5 mg total) by mouth 2 (two) times a day   bisoprolol (ZEBETA) 5 mg tablet  Self No No   Sig: Take 0 5 tablets (2 5 mg total) by mouth see administration instructions One half to one tablet daily as directed by physician   Patient taking differently: Take 2 5 mg by mouth as needed One half to one tablet daily as directed by physician    digoxin (LANOXIN) 0 125 mg tablet   No No   Sig: TAKE 1 TABLET DAILY   fluticasone-salmeterol (ADVAIR, WIXELA) 250-50 mcg/dose inhaler  Self No No   Sig: USE 1 INHALATION TWO TIMES A DAY   Patient not taking: Reported on 1/10/2022   furosemide (LASIX) 20 mg tablet   No No   Sig: TAKE 1/2 A TABLET BY MOUTH 2 TIMES A WEEK   lidocaine (Lidoderm) 5 %   No No   Sig: Apply 1 patch topically daily Remove & Discard patch within 12 hours or as directed by MD   omeprazole (PriLOSEC) 40 MG capsule  Self No No   Sig: TAKE 1 CAPSULE DAILY   Patient not taking: Reported on 1/10/2022   pravastatin (PRAVACHOL) 20 mg tablet   No No   Sig: TAKE 1 TABLET DAILY      Facility-Administered Medications: None       Past Medical History:   Diagnosis Date    Atrial fibrillation (HCC)     Fracture of ankle     Hyperlipidemia     Hypertension     Macular degeneration     Osteoarthritis     Vitamin D deficiency        Past Surgical History:   Procedure Laterality Date    APPENDECTOMY      PERINEAL  PELVIC RECONSTRUCTION SURGERY         Family History   Problem Relation Age of Onset    Supraventricular tachycardia Family      I have reviewed and agree with the history as documented  E-Cigarette/Vaping    E-Cigarette Use Never User      E-Cigarette/Vaping Substances    Nicotine No     THC No     CBD No     Flavoring No     Unknown No      Social History     Tobacco Use    Smoking status: Never Smoker    Smokeless tobacco: Never Used   Vaping Use    Vaping Use: Never used   Substance Use Topics    Alcohol use: No     Comment: Social drinker per [employer]    Drug use: No       Review of Systems   Constitutional: Negative  Negative for activity change, appetite change, fatigue and fever  HENT: Negative  Negative for congestion, ear pain and sore throat  Eyes: Negative  Negative for visual disturbance  Respiratory: Negative  Negative for cough, shortness of breath and wheezing  Cardiovascular: Positive for palpitations  Negative for chest pain  Gastrointestinal: Negative  Negative for abdominal pain, diarrhea, nausea and vomiting  Genitourinary: Negative  Musculoskeletal: Negative  Negative for myalgias  Skin: Negative  Negative for rash  Neurological: Negative    Negative for loss of consciousness and headaches  Psychiatric/Behavioral: Negative  Physical Exam  Physical Exam  Vitals reviewed  Constitutional:       Appearance: Normal appearance  She is normal weight  HENT:      Head: Normocephalic and atraumatic  Right Ear: External ear normal       Left Ear: External ear normal       Nose: Nose normal    Eyes:      General:         Right eye: No discharge  Left eye: No discharge  Conjunctiva/sclera: Conjunctivae normal    Cardiovascular:      Rate and Rhythm: Normal rate  Rhythm irregular  Pulses: Normal pulses  Pulmonary:      Effort: Pulmonary effort is normal    Abdominal:      Palpations: Abdomen is soft  Tenderness: There is no abdominal tenderness  There is no guarding  Musculoskeletal:         General: Normal range of motion  Cervical back: Normal range of motion  Skin:     General: Skin is warm and dry  Neurological:      Mental Status: She is alert           Vital Signs  ED Triage Vitals   Temperature Pulse Respirations Blood Pressure SpO2   04/23/22 1242 04/23/22 1242 04/23/22 1242 04/23/22 1247 04/23/22 1247   (!) 97 4 °F (36 3 °C) 73 15 157/76 97 %      Temp Source Heart Rate Source Patient Position - Orthostatic VS BP Location FiO2 (%)   04/23/22 1242 04/23/22 1504 04/23/22 1504 04/23/22 1504 --   Temporal Monitor Lying Left arm       Pain Score       04/23/22 1247       No Pain           Vitals:    04/23/22 1242 04/23/22 1247 04/23/22 1504   BP:  157/76 162/79   Pulse: 73  61   Patient Position - Orthostatic VS:   Lying         Visual Acuity      ED Medications  Medications   sodium chloride (PF) 0 9 % injection 3 mL (has no administration in time range)       Diagnostic Studies  Results Reviewed     Procedure Component Value Units Date/Time    HS Troponin I 2hr [[id number]]  (Normal) Collected: 04/23/22 1503    Lab Status: Final result Specimen: Blood from Arm, Left Updated: 04/23/22 1533     hs TnI 2hr 6 ng/L      Delta 2hr hsTnI 1 ng/L     HS Troponin I 4hr [[id number]]     Lab Status: No result Specimen: Blood     Basic metabolic panel [[id number]]  (Abnormal) Collected: 04/23/22 1305    Lab Status: Final result Specimen: Blood from Arm, Left Updated: 04/23/22 1335     Sodium 135 mmol/L      Potassium 3 9 mmol/L      Chloride 109 mmol/L      CO2 25 mmol/L      ANION GAP 1 mmol/L      BUN 22 mg/dL      Creatinine 0 80 mg/dL      Glucose 159 mg/dL      Calcium 8 3 mg/dL      eGFR 64 ml/min/1 73sq m     Narrative:      [place] guidelines for Chronic Kidney Disease (CKD):     Stage 1 with normal or high GFR (GFR > 90 mL/min/1 73 square meters)    Stage 2 Mild CKD (GFR = 60-89 mL/min/1 73 square meters)    Stage 3A Moderate CKD (GFR = 45-59 mL/min/1 73 square meters)    Stage 3B Moderate CKD (GFR = 30-44 mL/min/1 73 square meters)    Stage 4 Severe CKD (GFR = 15-29 mL/min/1 73 square meters)    Stage 5 End Stage CKD (GFR <15 mL/min/1 73 square meters)  Note: GFR calculation is accurate only with a steady state creatinine    NT-BNP PRO [[id number]]  (Abnormal) Collected: 04/23/22 1305    Lab Status: Final result Specimen: Blood from Arm, Left Updated: 04/23/22 1335     NT-proBNP 2,142 pg/mL     HS Troponin 0hr (reflex protocol) [[id number]]  (Normal) Collected: 04/23/22 1305    Lab Status: Final result Specimen: Blood from Arm, Left Updated: 04/23/22 1333     hs TnI 0hr 5 ng/L     CBC and differential [[id number]]  (Abnormal) Collected: 04/23/22 1305    Lab Status: Final result Specimen: Blood from Arm, Left Updated: 04/23/22 1312     WBC 6 73 Thousand/uL      RBC 4 28 Million/uL      Hemoglobin 12 6 g/dL      Hematocrit 40 8 %      MCV 95 fL      MCH 29 4 pg      MCHC 30 9 g/dL      RDW 14 6 %      MPV 10 6 fL      Platelets 715 Thousands/uL      nRBC 0 /100 WBCs      Neutrophils Relative 74 %      Immat GRANS % 0 %      Lymphocytes Relative 15 %      Monocytes Relative 8 %      Eosinophils Relative 2 %      Basophils Relative 1 % Neutrophils Absolute 4 98 Thousands/µL      Immature Grans Absolute 0 03 Thousand/uL      Lymphocytes Absolute 0 99 Thousands/µL      Monocytes Absolute 0 54 Thousand/µL      Eosinophils Absolute 0 13 Thousand/µL      Basophils Absolute 0 06 Thousands/µL                  X-ray chest 1 view portable   ED Interpretation by Radha Miller PA-C (04/23 1321)   No significant changes noted                 Procedures  Procedures         ED Course  ED Course as of 04/23/22 1728   Sat Apr 23, 2022   1334 hs TnI 0hr: 5   1335 NT-proBNP(!): 2,142  Increased from 1700 11 months ago   1418 Dr Eldon Graves, cardiology, consulted  Agrees unlikely to be ACS  Agrees if no delta troponin, okay to discharge   [address] 2hr hsTnI: 1                  Stroke Assessment     Row Name 04/23/22 1728             NIH Stroke Scale    Interval Baseline      Level of Consciousness (1a ) 0      LOC Questions (1b ) 0      LOC Commands (1c ) 0      Best Gaze (2 ) 0      Visual (3 ) 0      Facial Palsy (4 ) 0      Motor Arm, Left (5a ) 0      Motor Arm, Right (5b ) 0      Motor Leg, Left (6a ) 0      Motor Leg, Right (6b ) 0      Limb Ataxia (7 ) 0      Sensory (8 ) 0      Best Language (9 ) 0      Dysarthria (10 ) 0      Extinction and Inattention (11 ) (Formerly Neglect) 0      Total 0                                          MDM  Number of Diagnoses or Management Options  Atrial fibrillation, unspecified type ([facility] ): new and does not require workup  Palpitations: new and does not require workup  Diagnosis management comments: Patient is a 71-year-old female who comes in for complaint of palpitations earlier today  Patient states she feels fine at this time  Patient has a delta troponin of 1  No signs or changes ischemia on EKG  Consulted Cardiology, agrees the patient will be discharged home  Discussed with the patient, as well as the patient's daughter, that if she has any pain she needs to come back for further evaluation  Patient is okay with this plan this time  Counseling: I had a detailed discussion with the patient and/or guardian regarding: the historical points, exam findings, and any diagnostic results supporting the discharge diagnosis, lab results, radiology results, discharge instructions reviewed with patient and/or family/caregiver and understanding was verbalized  Instructions given to return to the emergency department if symptoms worsen or persist, or if there are any questions or concerns that arise at home       All labs reviewed and utilized in the medical decision making process     All radiology studies independently viewed by me and interpreted by the radiologist     Portions of the record may have been created with voice recognition software   Occasional wrong word or "sound a like" substitutions may have occurred due to the inherent limitations of voice recognition software   Read the chart carefully and recognize, using context, where substitutions have occurred  Amount and/or Complexity of Data Reviewed  Clinical lab tests: ordered and reviewed  Tests in the radiology section of CPT®: ordered and reviewed    Risk of Complications, Morbidity, and/or Mortality  Presenting problems: low  Diagnostic procedures: minimal  Management options: minimal    Patient Progress  Patient progress: stable      Disposition  Final diagnoses:   Atrial fibrillation, unspecified type ([facility] )   Palpitations     Time reflects when diagnosis was documented in both MDM as applicable and the Disposition within this note     Time User Action Codes Description Comment    4/23/2022  3:58 PM Franciso Stain Add [I48 91] Atrial fibrillation, unspecified type ([facility] )     4/23/2022  3:58 PM Franciso Stain Add [R00 2] Palpitations       ED Disposition     ED Disposition Condition Date/Time Comment    Discharge Stable Sat Apr 23, 2022  3:58 PM Crystaludepamela Moseley discharge to home/self care              Follow-up Information Follow up With Specialties Details Why Contact Info Additional Information    Ritesh Randall, DO Internal Medicine   [address] [facility]  [address]  [place]   49  [phone]  [address] [facility] Emergency Department Emergency Medicine  As needed, If symptoms worsen [place] [phone]  [address] [facility] Emergency Department, [address] , [place], [address] [facility], [address]          Discharge Medication List as of 4/23/2022  3:59 PM      CONTINUE these medications which have NOT CHANGED    Details   apixaban (Eliquis) 2 5 mg Take 1 tablet (2 5 mg total) by mouth 2 (two) times a day, Starting Tue 5/19/2020, Normal      bisoprolol (ZEBETA) 5 mg tablet Take 0 5 tablets (2 5 mg total) by mouth see administration instructions One half to one tablet daily as directed by physician, Starting Wed 11/3/2021, Normal      digoxin (LANOXIN) 0 125 mg tablet TAKE 1 TABLET DAILY, Normal      fluticasone-salmeterol (ADVAIR, WIXELA) 250-50 mcg/dose inhaler USE 1 INHALATION TWO TIMES A DAY, Normal      furosemide (LASIX) 20 mg tablet TAKE 1/2 A TABLET BY MOUTH 2 TIMES A WEEK, Normal      lidocaine (Lidoderm) 5 % Apply 1 patch topically daily Remove & Discard patch within 12 hours or as directed by MD, Starting Mon 3/7/2022, Normal      Multiple Vitamins-Minerals (PRESERVISION AREDS 2+MULTI VIT) CAPS Take 1 capsule by mouth 2 (two) times a day , Historical Med      omeprazole (PriLOSEC) 40 MG capsule TAKE 1 CAPSULE DAILY, Normal      pravastatin (PRAVACHOL) 20 mg tablet TAKE 1 TABLET DAILY, Normal             No discharge procedures on file      PDMP Review     None          ED Provider  Electronically Signed by           Judy Donnelly PA-C  04/23/22 9113

## 2022-04-23 NOTE — TELEPHONE ENCOUNTER
Regarding: A-fib, abnormal feelings in chest  ----- Message from [facility] sent at 4/23/2022 10:51 AM EDT -----  "I have a fib and I'm having some unusual feelings in my chest this morning   I'm hesitant to go to the hospital and would like to talk to someone about it "

## 2022-04-23 NOTE — Clinical Note
Ellie Mosher was seen and treated in our emergency department on 4/23/2022  No restrictions            Diagnosis:     Prudence    She may return on this date: If you have any questions or concerns, please don't hesitate to call        Flavia Claire PA-C    ______________________________           _______________          _______________  Hospital Representative                              Date                                Time

## 2022-04-24 LAB
ATRIAL RATE: 197 BPM
QRS AXIS: 84 DEGREES
QRSD INTERVAL: 80 MS
QT INTERVAL: 400 MS
QTC INTERVAL: 400 MS
T WAVE AXIS: 52 DEGREES
VENTRICULAR RATE: 60 BPM

## 2022-04-24 PROCEDURE — 93010 ELECTROCARDIOGRAM REPORT: CPT | Performed by: INTERNAL MEDICINE

## 2022-04-25 NOTE — TELEPHONE ENCOUNTER
Called pt-reviewed ER visit  Natalie Downs no chest pain  Natalie Downs trops ok    in afib but always in AFIB    bnp up but no SOB  Natalie Downs is taking lasix  Will arrange appt for June  TO CLERICAL  Natalie Downs PLEASE ARRANGE APPT WITH ME IN June    reason AFIB

## 2022-05-10 DIAGNOSIS — I48.0 PAF (PAROXYSMAL ATRIAL FIBRILLATION) (HCC): ICD-10-CM

## 2022-05-10 RX ORDER — APIXABAN 2.5 MG/1
TABLET, FILM COATED ORAL
Qty: 180 TABLET | Refills: 3 | Status: SHIPPED | OUTPATIENT
Start: 2022-05-10

## 2022-08-04 ENCOUNTER — RA CDI HCC (OUTPATIENT)
Dept: OTHER | Facility: HOSPITAL | Age: 87
End: 2022-08-04

## 2022-08-04 NOTE — PROGRESS NOTES
[facility]  coding opportunities        I11 0  Chart Reviewed number of suggestions sent to Provider: 1     Patients Insurance     Medicare Insurance: [employer]

## 2022-08-09 NOTE — ASSESSMENT & PLAN NOTE
Prior lipid panel October 2020 - total cholesterol 192, TG 69, HDL 76,   -Continue with pravastatin for now  Consider repeat lipid panel with next set of labs    Patient to discuss with Cardiology

## 2022-08-09 NOTE — ASSESSMENT & PLAN NOTE
-rate controlled with digoxin  Prior level acceptable per Cardiology  -on reduced dose Eliquis for stroke prevention  -cardiology previously discussed Watchman procedure with the patient which she declined  -she used to see Dr Perla Wooten    Will be transitioning care to Dr Howie Lucas

## 2022-08-09 NOTE — PROGRESS NOTES
INTERNAL MEDICINE OFFICE VISIT  [facility] Internal Medicine- Pitcher    NAME: Garth Zimmerman  AGE: 80 y o  SEX: female    DATE OF ENCOUNTER: 8/10/2022    Assessment and Plan     1  Permanent atrial fibrillation (HCC)  Assessment & Plan:  -rate controlled with digoxin  Prior level acceptable per Cardiology  -on reduced dose Eliquis for stroke prevention  -cardiology previously discussed Watchman procedure with the patient which she declined  -she used to see Dr Miguel Craft  Will be transitioning care to Dr Robert Che shortly      2  Primary hypertension  Assessment & Plan:  Uses bisoprolol as needed for SBP greater than 150      3  Mixed hyperlipidemia  Assessment & Plan:  Prior lipid panel October 2020 - total cholesterol 192, TG 69, HDL 76,   -Continue with pravastatin for now  Consider repeat lipid panel with next set of labs  Patient to discuss with Cardiology      4  Chronic diastolic (congestive) heart failure (HCC)  Assessment & Plan:  -on Lasix 10 mg 2 times weekly  -peripheral edema seems to be much improved today          5  Encounter for immunization    6  Left leg numbness  -for the past 3 months or so she has had intermittent numbness of the left buttock and left upper thigh  She denies any inciting trauma  Symptoms tend to be more bothersome towards the end of the day  She denies any pain  She does admit to having poor balance and utilizes a walker  The swelling in her left lower extremity is improved  -on exam, her active and passive range of motion is quite adequate  She does endorse a history of spinal stenosis  It is possible this is related to lumbar radiculopathy  -recommend conservative management  If she is having significant discomfort, can consider topical medications such as Biofreeze, Ulisses-Elder, lidocaine patches  Okay to use Voltaren gel in the short term due to lack of systemic absorption, She is on Eliquis notably  -suggested course of physical therapy    Patient not interested    7  Lower extremity edema             No orders of the defined types were placed in this encounter  Chief Complaint     Chief Complaint   Patient presents with    Medicare Wellness Visit    Follow-up     6 month        History of Present Illness     Here today for follow-up/annual wellness visit  Medical history of diastolic CHF, permanent atrial fibrillation, hypertension, hyperlipidemia, PVCs      The following portions of the patient's history were reviewed and updated as appropriate: allergies, current medications, past family history, past medical history, past social history, past surgical history and problem list     Review of Systems     10 point ROS negative except per HPI    Active Problem List     Patient Active Problem List   Diagnosis    Anemia    Hyperlipidemia    Hypertension    Permanent atrial fibrillation ([address] )    Premature ventricular contraction    Vitamin D deficiency    Esophageal reflux    Ulnar nerve entrapment at elbow    Numbness of hand    Lower extremity edema    Chronic diastolic (congestive) heart failure (HCC)       Objective     /70 (BP Location: Left arm, Patient Position: Sitting, Cuff Size: Standard)   Pulse (!) 107   Temp (!) 97 3 °F (36 3 °C)   Resp 16   Ht 5' 3" (1 6 m)   Wt 58 5 kg (129 lb)   SpO2 96%   BMI 22 85 kg/m²     Physical Exam  Constitutional:       Appearance: Normal appearance  She is not ill-appearing  HENT:      Head: Normocephalic and atraumatic  Eyes:      General: No scleral icterus  Right eye: No discharge  Left eye: No discharge  Cardiovascular:      Rate and Rhythm: Normal rate and regular rhythm  Heart sounds: No murmur heard  No friction rub  Pulmonary:      Effort: Pulmonary effort is normal       Breath sounds: Normal breath sounds  No wheezing or rales  Abdominal:      General: Abdomen is flat  There is no distension  Palpations: Abdomen is soft  Tenderness:  There is no abdominal tenderness  Musculoskeletal:         General: No swelling or tenderness  Skin:     General: Skin is warm and dry  Findings: No erythema  Neurological:      Mental Status: She is alert  Mental status is at baseline  Motor: No weakness  Psychiatric:         Mood and Affect: Mood normal          Behavior: Behavior normal          Pertinent Laboratory/Diagnostic Studies:  X-ray chest 1 view portable    Result Date: 4/24/2022  Impression: Increased linear bibasilar lung markings most consistent with atelectasis/scarring, see above for details and additional findings   This report agrees with the preliminary interpretation provided by the emergency department physician Workstation performed: [id number]       Images and diagnostics reviewed     Current Medications     Current Outpatient Medications:     bisoprolol (ZEBETA) 5 mg tablet, Take 0 5 tablets (2 5 mg total) by mouth see administration instructions One half to one tablet daily as directed by physician (Patient taking differently: Take 2 5 mg by mouth as needed One half to one tablet daily as directed by physician), Disp: 90 tablet, Rfl: 3    digoxin (LANOXIN) 0 125 mg tablet, TAKE 1 TABLET DAILY, Disp: 90 tablet, Rfl: 3    Eliquis 2 5 MG, TAKE 1 TABLET TWICE A DAY, Disp: 180 tablet, Rfl: 3    furosemide (LASIX) 20 mg tablet, TAKE 1/2 A TABLET BY MOUTH 2 TIMES A WEEK, Disp: 12 tablet, Rfl: 3    pravastatin (PRAVACHOL) 20 mg tablet, TAKE 1 TABLET DAILY, Disp: 90 tablet, Rfl: 3    Health Maintenance     Health Maintenance   Topic Date Due    Pneumococcal Vaccine: 65+ Years (2 - PPSV23 or PCV20) 01/16/2020    COVID-19 Vaccine (3 - Booster for Moderna series) 08/04/2021    Influenza Vaccine (1) 09/01/2022    Fall Risk  08/10/2023    Depression Screening  08/10/2023    Urinary Incontinence Screening  08/10/2023    Medicare Annual Wellness Visit (AWV)  08/10/2023    BMI: Adult  08/10/2023    HIB Vaccine  Aged Out    Hepatitis B Vaccine  Aged Out    IPV Vaccine  Aged Out    Hepatitis A Vaccine  Aged Out    Meningococcal ACWY Vaccine  Aged Out    HPV Vaccine  Aged Out     Immunization History   Administered Date(s) Administered    COVID-19 MODERNA VACC 0 5 ML IM 02/06/2021, 03/04/2021    DTaP 5 05/11/2011    H1N1, All Formulations 01/08/2010    INFLUENZA 11/08/2018    Influenza Split High Dose Preservative Free IM 10/22/2012, 11/19/2013, 10/15/2014, 11/18/2015, 10/20/2016, 10/26/2017    Influenza, high dose seasonal 0 7 mL 11/08/2018, 10/02/2019, 10/02/2020, 10/12/2021    Influenza, seasonal, injectable 10/27/2011    Pneumococcal Conjugate 13-Valent 01/16/2019       HERMELINDA Marley  Internal Medicine [place] [address] [address], [facility] [address]  [facility], [address]   Office: [phone]  Fax: [phone]

## 2022-08-10 ENCOUNTER — OFFICE VISIT (OUTPATIENT)
Dept: INTERNAL MEDICINE CLINIC | Facility: CLINIC | Age: 87
End: 2022-08-10
Payer: MEDICARE

## 2022-08-10 VITALS
DIASTOLIC BLOOD PRESSURE: 70 MMHG | HEART RATE: 107 BPM | BODY MASS INDEX: 22.86 KG/M2 | TEMPERATURE: 97.3 F | SYSTOLIC BLOOD PRESSURE: 142 MMHG | RESPIRATION RATE: 16 BRPM | HEIGHT: 63 IN | WEIGHT: 129 LBS | OXYGEN SATURATION: 96 %

## 2022-08-10 DIAGNOSIS — I50.32 CHRONIC DIASTOLIC (CONGESTIVE) HEART FAILURE (HCC): ICD-10-CM

## 2022-08-10 DIAGNOSIS — R60.0 LOWER EXTREMITY EDEMA: ICD-10-CM

## 2022-08-10 DIAGNOSIS — R20.0 LEFT LEG NUMBNESS: ICD-10-CM

## 2022-08-10 DIAGNOSIS — I10 PRIMARY HYPERTENSION: ICD-10-CM

## 2022-08-10 DIAGNOSIS — I48.21 PERMANENT ATRIAL FIBRILLATION (HCC): Primary | ICD-10-CM

## 2022-08-10 DIAGNOSIS — E78.2 MIXED HYPERLIPIDEMIA: ICD-10-CM

## 2022-08-10 PROCEDURE — G0439 PPPS, SUBSEQ VISIT: HCPCS | Performed by: INTERNAL MEDICINE

## 2022-08-10 PROCEDURE — 99214 OFFICE O/P EST MOD 30 MIN: CPT | Performed by: INTERNAL MEDICINE

## 2022-08-10 NOTE — PROGRESS NOTES
Assessment and Plan:     Problem List Items Addressed This Visit        Cardiovascular and Mediastinum    Hypertension     Uses bisoprolol as needed for SBP greater than 150         Permanent atrial fibrillation (HCC) - Primary     -rate controlled with digoxin  Prior level acceptable per Cardiology  -on reduced dose Eliquis for stroke prevention  -cardiology previously discussed Watchman procedure with the patient which she declined  -she used to see Dr Gilberto Giraldo  Will be transitioning care to Dr Cristobal Gracia shortly         Chronic diastolic (congestive) heart failure (HCC)     -on Lasix 10 mg 2 times weekly  -peripheral edema seems to be much improved today                Other    Hyperlipidemia     Prior lipid panel October 2020 - total cholesterol 192, TG 69, HDL 76,   -Continue with pravastatin for now  Consider repeat lipid panel with next set of labs  Patient to discuss with Cardiology         Lower extremity edema      Other Visit Diagnoses     Left leg numbness               Preventive health issues were discussed with patient, and age appropriate screening tests were ordered as noted in patient's After Visit Summary  Personalized health advice and appropriate referrals for health education or preventive services given if needed, as noted in patient's After Visit Summary       History of Present Illness:     Patient presents for a Medicare Wellness Visit    HPI   Patient Care Team:  Ritesh Mckenna DO as PCP - General (Internal Medicine)  MD Neyda Palma MD     Review of Systems:     Review of Systems     Problem List:     Patient Active Problem List   Diagnosis    Anemia    Hyperlipidemia    Hypertension    Permanent atrial fibrillation ([address] )    Premature ventricular contraction    Vitamin D deficiency    Esophageal reflux    Ulnar nerve entrapment at elbow    Numbness of hand    Lower extremity edema    Chronic diastolic (congestive) heart failure (HCC)      Past Medical and Surgical History:     Past Medical History:   Diagnosis Date    Fracture of ankle     Hyperlipidemia     Hypertension     Macular degeneration     Osteoarthritis     Vitamin D deficiency      Past Surgical History:   Procedure Laterality Date    APPENDECTOMY      PERINEAL  PELVIC RECONSTRUCTION SURGERY        Family History:     Family History   Problem Relation Age of Onset    Supraventricular tachycardia Family       Social History:     Social History     Socioeconomic History    Marital status:      Spouse name: None    Number of children: None    Years of education: None    Highest education level: None   Occupational History    None   Tobacco Use    Smoking status: Never Smoker    Smokeless tobacco: Never Used   Vaping Use    Vaping Use: Never used   Substance and Sexual Activity    Alcohol use: No     Comment: Social drinker per [employer]    Drug use: No    Sexual activity: Not Currently   Other Topics Concern    None   Social History Narrative    None     Social Determinants of Health     Financial Resource Strain: Low Risk     Difficulty of Paying Living Expenses: Not hard at all   Food Insecurity: Not on file   Transportation Needs: No Transportation Needs    Lack of Transportation (Medical): No    Lack of Transportation (Non-Medical):  No   Physical Activity: Not on file   Stress: Not on file   Social Connections: Not on file   Intimate Partner Violence: Not on file   Housing Stability: Not on file      Medications and Allergies:     Current Outpatient Medications   Medication Sig Dispense Refill    bisoprolol (ZEBETA) 5 mg tablet Take 0 5 tablets (2 5 mg total) by mouth see administration instructions One half to one tablet daily as directed by physician (Patient taking differently: Take 2 5 mg by mouth as needed One half to one tablet daily as directed by physician) 90 tablet 3    digoxin (LANOXIN) 0 125 mg tablet TAKE 1 TABLET DAILY 90 tablet 3    Eliquis 2 5 MG TAKE 1 TABLET TWICE A  tablet 3    furosemide (LASIX) 20 mg tablet TAKE 1/2 A TABLET BY MOUTH 2 TIMES A WEEK 12 tablet 3    pravastatin (PRAVACHOL) 20 mg tablet TAKE 1 TABLET DAILY 90 tablet 3     No current facility-administered medications for this visit  No Known Allergies   Immunizations:     Immunization History   Administered Date(s) Administered    COVID-19 MODERNA VACC 0 5 ML IM 02/06/2021, 03/04/2021    DTaP 5 05/11/2011    H1N1, All Formulations 01/08/2010    INFLUENZA 11/08/2018    Influenza Split High Dose Preservative Free IM 10/22/2012, 11/19/2013, 10/15/2014, 11/18/2015, 10/20/2016, 10/26/2017    Influenza, high dose seasonal 0 7 mL 11/08/2018, 10/02/2019, 10/02/2020, 10/12/2021    Influenza, seasonal, injectable 10/27/2011    Pneumococcal Conjugate 13-Valent 01/16/2019      Health Maintenance: There are no preventive care reminders to display for this patient  Topic Date Due    Pneumococcal Vaccine: 65+ Years (2 - PPSV23 or PCV20) 01/16/2020    COVID-19 Vaccine (3 - Booster for Moderna series) 08/04/2021    Influenza Vaccine (1) 09/01/2022      Medicare Screening Tests and Risk Assessments:         Health Risk Assessment:   Patient rates overall health as fair  Patient feels that their physical health rating is same  Patient is satisfied with their life  Eyesight was rated as slightly worse  Hearing was rated as slightly worse  Patient feels that their emotional and mental health rating is same  Patients states they are sometimes angry  Patient states they are sometimes unusually tired/fatigued  Pain experienced in the last 7 days has been none  Patient states that she has experienced no weight loss or gain in last 6 months  Fall Risk Screening: In the past year, patient has experienced: no history of falling in past year      Urinary Incontinence Screening:   Patient has not leaked urine accidently in the last six months       Home Safety:  Patient has trouble with stairs inside or outside of their home  Patient has working smoke alarms and has working carbon monoxide detector  Home safety hazards include: none  Nutrition:   Current diet is Regular and Frequent junk food  Medications:   Patient is currently taking over-the-counter supplements  OTC medications include: see medication list  Patient is able to manage medications  Activities of Daily Living (ADLs)/Instrumental Activities of Daily Living (IADLs):   Walk and transfer into and out of bed and chair?: Yes  Dress and groom yourself?: Yes    Bathe or shower yourself?: Yes    Feed yourself? Yes  Do your laundry/housekeeping?: Yes  Manage your money, pay your bills and track your expenses?: Yes  Make your own meals?: No    Do your own shopping?: No    Previous Hospitalizations:   Any hospitalizations or ED visits within the last 12 months?: Yes    How many hospitalizations have you had in the last year?: 1-2    Advance Care Planning:   Living will: Yes    Durable POA for healthcare: Yes    Advanced directive: Yes      PREVENTIVE SCREENINGS      Cardiovascular Screening:    General: Screening Not Indicated and History Lipid Disorder      Diabetes Screening:     General: Screening Current      Colorectal Cancer Screening:     General: Screening Not Indicated      Cervical Cancer Screening:    General: Screening Not Indicated      Lung Cancer Screening:     General: Screening Not Indicated    Screening, Brief Intervention, and Referral to Treatment (SBIRT)    Screening  Typical number of drinks in a day: 0  Typical number of drinks in a week: 0  Interpretation: Low risk drinking behavior      Single Item Drug Screening:  How often have you used an illegal drug (including marijuana) or a prescription medication for non-medical reasons in the past year? never    Single Item Drug Screen Score: 0  Interpretation: Negative screen for possible drug use disorder    No exam data present     Physical Exam: /70 (BP Location: Left arm, Patient Position: Sitting, Cuff Size: Standard)   Pulse (!) 107   Temp (!) 97 3 °F (36 3 °C)   Resp 16   Ht 5' 3" (1 6 m)   Wt 58 5 kg (129 lb)   SpO2 96%   BMI 22 85 kg/m²     Physical Exam     Max Magen Lazar DO

## 2022-08-10 NOTE — PATIENT INSTRUCTIONS
Medicare Preventive Visit Patient Instructions  Thank you for completing your Welcome to Medicare Visit or Medicare Annual Wellness Visit today  Your next wellness visit will be due in one year (8/11/2023)  The screening/preventive services that you may require over the next 5-10 years are detailed below  Some tests may not apply to you based off risk factors and/or age  Screening tests ordered at today's visit but not completed yet may show as past due  Also, please note that scanned in results may not display below  Preventive Screenings:  Service Recommendations Previous Testing/Comments   Colorectal Cancer Screening  * Colonoscopy    * Fecal Occult Blood Test (FOBT)/Fecal Immunochemical Test (FIT)  * Fecal DNA/Cologuard Test  * Flexible Sigmoidoscopy Age: 39-70 years old   Colonoscopy: every 10 years (may be performed more frequently if at higher risk)  OR  FOBT/FIT: every 1 year  OR  Cologuard: every 3 years  OR  Sigmoidoscopy: every 5 years  Screening may be recommended earlier than age 39 if at higher risk for colorectal cancer  Also, an individualized decision between you and your healthcare provider will decide whether screening between the ages of 74-80 would be appropriate  Colonoscopy: Not on file  FOBT/FIT: Not on file  Cologuard: Not on file  Sigmoidoscopy: Not on file    Screening Not Indicated     Breast Cancer Screening Age: 36 years old  Frequency: every 1-2 years  Not required if history of left and right mastectomy Mammogram: Not on file        Cervical Cancer Screening Between the ages of 21-29, pap smear recommended once every 3 years  Between the ages of 33-67, can perform pap smear with HPV co-testing every 5 years     Recommendations may differ for women with a history of total hysterectomy, cervical cancer, or abnormal pap smears in past  Pap Smear: Not on file    Screening Not Indicated   Hepatitis C Screening Once for adults born between 1945 and 1965  More frequently in patients at high risk for Hepatitis C Hep C Antibody: Not on file        Diabetes Screening 1-2 times per year if you're at risk for diabetes or have pre-diabetes Fasting glucose: 130 mg/dL (5/17/2021)  A1C: No results in last 5 years (No results in last 5 years)  Screening Current   Cholesterol Screening Once every 5 years if you don't have a lipid disorder  May order more often based on risk factors  Lipid panel: 10/30/2020    Screening Not Indicated  History Lipid Disorder     Other Preventive Screenings Covered by Medicare:  1  Abdominal Aortic Aneurysm (AAA) Screening: covered once if your at risk  You're considered to be at risk if you have a family history of AAA  2  Lung Cancer Screening: covers low dose CT scan once per year if you meet all of the following conditions: (1) Age 50-69; (2) No signs or symptoms of lung cancer; (3) Current smoker or have quit smoking within the last 15 years; (4) You have a tobacco smoking history of at least 20 pack years (packs per day multiplied by number of years you smoked); (5) You get a written order from a healthcare provider  3  Glaucoma Screening: covered annually if you're considered high risk: (1) You have diabetes OR (2) Family history of glaucoma OR (3)  aged 48 and older OR (3)  American aged 72 and older  3  Osteoporosis Screening: covered every 2 years if you meet one of the following conditions: (1) You're estrogen deficient and at risk for osteoporosis based off medical history and other findings; (2) Have a vertebral abnormality; (3) On glucocorticoid therapy for more than 3 months; (4) Have primary hyperparathyroidism; (5) On osteoporosis medications and need to assess response to drug therapy  · Last bone density test (DXA Scan): 09/28/1916   5  HIV Screening: covered annually if you're between the age of 15-65  Also covered annually if you are younger than 13 and older than 72 with risk factors for HIV infection   For pregnant patients, it is covered up to 3 times per pregnancy  Immunizations:  Immunization Recommendations   Influenza Vaccine Annual influenza vaccination during flu season is recommended for all persons aged >= 6 months who do not have contraindications   Pneumococcal Vaccine   * Pneumococcal conjugate vaccine = PCV13 (Prevnar 13), PCV15 (Vaxneuvance), PCV20 (Prevnar 20)  * Pneumococcal polysaccharide vaccine = PPSV23 (Pneumovax) Adults 25-60 years old: 1-3 doses may be recommended based on certain risk factors  Adults 72 years old: 1-2 doses may be recommended based off what pneumonia vaccine you previously received   Hepatitis B Vaccine 3 dose series if at intermediate or high risk (ex: diabetes, end stage renal disease, liver disease)   Tetanus (Td) Vaccine - COST NOT COVERED BY MEDICARE PART B Following completion of primary series, a booster dose should be given every 10 years to maintain immunity against tetanus  Td may also be given as tetanus wound prophylaxis  Tdap Vaccine - COST NOT COVERED BY MEDICARE PART B Recommended at least once for all adults  For pregnant patients, recommended with each pregnancy  Shingles Vaccine (Shingrix) - COST NOT COVERED BY MEDICARE PART B  2 shot series recommended in those aged 48 and above     Health Maintenance Due:  There are no preventive care reminders to display for this patient  Immunizations Due:      Topic Date Due    Pneumococcal Vaccine: 65+ Years (2 - PPSV23 or PCV20) 01/16/2020    COVID-19 Vaccine (3 - Booster for Moderna series) 08/04/2021    Influenza Vaccine (1) 09/01/2022     Advance Directives   What are advance directives? Advance directives are legal documents that state your wishes and plans for medical care  These plans are made ahead of time in case you lose your ability to make decisions for yourself  Advance directives can apply to any medical decision, such as the treatments you want, and if you want to donate organs     What are the types of advance directives? There are many types of advance directives, and each state has rules about how to use them  You may choose a combination of any of the following:  · Living will: This is a written record of the treatment you want  You can also choose which treatments you do not want, which to limit, and which to stop at a certain time  This includes surgery, medicine, IV fluid, and tube feedings  · Durable power of  for healthcare [facility]): This is a written record that states who you want to make healthcare choices for you when you are unable to make them for yourself  This person, called a proxy, is usually a family member or a friend  You may choose more than 1 proxy  · Do not resuscitate (DNR) order:  A DNR order is used in case your heart stops beating or you stop breathing  It is a request not to have certain forms of treatment, such as CPR  A DNR order may be included in other types of advance directives  · Medical directive: This covers the care that you want if you are in a coma, near death, or unable to make decisions for yourself  You can list the treatments you want for each condition  Treatment may include pain medicine, surgery, blood transfusions, dialysis, IV or tube feedings, and a ventilator (breathing machine)  · Values history: This document has questions about your views, beliefs, and how you feel and think about life  This information can help others choose the care that you would choose  Why are advance directives important? An advance directive helps you control your care  Although spoken wishes may be used, it is better to have your wishes written down  Spoken wishes can be misunderstood, or not followed  Treatments may be given even if you do not want them  An advance directive may make it easier for your family to make difficult choices about your care         © Copyright [employer] 2018 Information is for End User's use only and may not be sold, redistributed or otherwise used for commercial purposes   All illustrations and images included in CareNotes® are the copyrighted property of A [employer] M [employer]  or [facility]– [place]ru[place]

## 2022-08-26 DIAGNOSIS — I10 ESSENTIAL HYPERTENSION: ICD-10-CM

## 2022-08-26 RX ORDER — BISOPROLOL FUMARATE 5 MG/1
2.5 TABLET, FILM COATED ORAL SEE ADMIN INSTRUCTIONS
Qty: 90 TABLET | Refills: 3 | Status: SHIPPED | OUTPATIENT
Start: 2022-08-26

## 2022-09-01 NOTE — PROGRESS NOTES
Cardiology Office Note  MD Mable Craig MD Adele Blanks, DO, [facility] MD Tien Farrar DO, Nino Jorgensen DO, [facility] [place]  ----------------------------------------------------------------  [address] [address]    Mable Raya 80 y o  female MRN: [id number]  Unit/Bed#:  Encounter: [id number]      History of Present Illness: It was a pleasure to see Mane Lima in the office today for follow-up CV evaluation  She has a past medical history of chronic diastolic heart failure, atrial fibrillation, hypertension, dyslipidemia, PVCs and GERD  She established care with me in September 2022  She previously followed with Dr Rafa Cross  Patient was chronically followed by Dr Rafa Cross due to her diastolic heart failure and atrial fibrillation  The patient was managed on Eliquis twice daily as well as bisoprolol  In the past, the patient had been taking her bisoprolol when her systolic pressure exceeded 150 mm Hg on a daily basis  She would take it only intermittently during those times  Overall, she has been feeling well aside from some lower extremity swelling  She denies any chest pain, pressure, tightness or squeezing  Denies lightheadedness, dizziness or palpitations  Review of Systems:  Review of Systems   Constitutional: Negative for decreased appetite, fever, weight gain and weight loss  HENT: Negative for congestion and sore throat  Eyes: Negative for visual disturbance  Cardiovascular: Positive for leg swelling  Negative for chest pain, dyspnea on exertion, near-syncope and palpitations  Respiratory: Negative for cough and shortness of breath  Hematologic/Lymphatic: Negative for bleeding problem  Skin: Negative for rash  Musculoskeletal: Negative for myalgias and neck pain  Gastrointestinal: Negative for abdominal pain and nausea  Neurological: Negative for light-headedness and weakness  Psychiatric/Behavioral: Negative for depression  Past Medical History:   Diagnosis Date    Fracture of ankle     Hyperlipidemia     Hypertension     Macular degeneration     Osteoarthritis     Vitamin D deficiency        Past Surgical History:   Procedure Laterality Date    APPENDECTOMY      PERINEAL  PELVIC RECONSTRUCTION SURGERY         Social History     Socioeconomic History    Marital status:      Spouse name: Not on file    Number of children: Not on file    Years of education: Not on file    Highest education level: Not on file   Occupational History    Not on file   Tobacco Use    Smoking status: Never Smoker    Smokeless tobacco: Never Used   Vaping Use    Vaping Use: Never used   Substance and Sexual Activity    Alcohol use: No     Comment: Social drinker per [employer]    Drug use: No    Sexual activity: Not Currently   Other Topics Concern    Not on file   Social History Narrative    Not on file     Social Determinants of Health     Financial Resource Strain: Low Risk     Difficulty of Paying Living Expenses: Not hard at all   Food Insecurity: Not on file   Transportation Needs: No Transportation Needs    Lack of Transportation (Medical): No    Lack of Transportation (Non-Medical):  No   Physical Activity: Not on file   Stress: Not on file   Social Connections: Not on file   Intimate Partner Violence: Not on file   Housing Stability: Not on file       Family History   Problem Relation Age of Onset    Supraventricular tachycardia Family        No Known Allergies      Current Outpatient Medications:     bisoprolol (ZEBETA) 5 mg tablet, Take 0 5 tablets (2 5 mg total) by mouth see administration instructions One half to one tablet daily as directed by physician (Patient taking differently: Take 2 5 mg by mouth see administration instructions One half to one tablet daily as directed by physician as needed 293 systolic or higher), Disp: 90 tablet, Rfl: 3    digoxin (LANOXIN) 0 125 mg tablet, TAKE 1 TABLET DAILY, Disp: 90 tablet, Rfl: 3    Eliquis 2 5 MG, TAKE 1 TABLET TWICE A DAY, Disp: 180 tablet, Rfl: 3    furosemide (LASIX) 20 mg tablet, TAKE 1/2 A TABLET BY MOUTH 2 TIMES A WEEK, Disp: 12 tablet, Rfl: 3    pravastatin (PRAVACHOL) 20 mg tablet, TAKE 1 TABLET DAILY, Disp: 90 tablet, Rfl: 3    Vitals:    09/02/22 1529   BP: 160/90   BP Location: Right arm   Patient Position: Sitting   Cuff Size: Adult   Pulse: 64   Weight: 58 8 kg (129 lb 9 6 oz)   Height: 5' 3" (1 6 m)     Body mass index is 22 96 kg/m²  PHYSICAL EXAMINATION:  Gen: Awake, Alert, NAD   Head/eyes: AT/NC, pupils equal and round, Anicteric  ENT: mmm  Neck: Supple, No elevated JVP, trachea midline  Resp: CTA bilaterally no w/r/r  CV: irreg irreg +S1, S2, No m/r/g  Abd: Soft, NT/ND + BS  Ext: +1 pitting LE edema bilaterally  Neuro: Follows commands, moves all extermities  Psych: Appropriate affect, normal mood, pleasant attitude, non-combative  Skin: warm; no rash, erythema or venous stasis changes on exposed skin    --------------------------------------------------------------------------------  TREADMILL STRESS  No results found for this or any previous visit      --------------------------------------------------------------------------------  NUCLEAR STRESS TEST: No results found for this or any previous visit  No results found for this or any previous visit       --------------------------------------------------------------------------------  CATH:  No results found for this or any previous visit     --------------------------------------------------------------------------------  ECHO:   No results found for this or any previous visit  No results found for this or any previous visit     --------------------------------------------------------------------------------  HOLTER  No results found for this or any previous visit      No results found for this or any previous visit     --------------------------------------------------------------------------------  CAROTIDS  No results found for this or any previous visit      --------------------------------------------------------------------------------  ECGs:  No results found for this visit on 09/02/22  Lab Results   Component Value Date    WBC 6 73 04/23/2022    HGB 12 6 04/23/2022    HCT 40 8 04/23/2022    MCV 95 04/23/2022     04/23/2022      Lab Results   Component Value Date    SODIUM 135 (L) 04/23/2022    K 3 9 04/23/2022     (H) 04/23/2022    CO2 25 04/23/2022    BUN 22 04/23/2022    CREATININE 0 80 04/23/2022    GLUC 159 (H) 04/23/2022    CALCIUM 8 3 04/23/2022      No results found for: HGBA1C   Lab Results   Component Value Date    CHOL 238 07/07/2015    CHOL 208 01/14/2014     Lab Results   Component Value Date    HDL 76 10/30/2020    HDL 59 07/20/2018    HDL 76 (H) 03/08/2017     Lab Results   Component Value Date    LDLCALC 102 (H) 10/30/2020    LDLCALC 99 07/20/2018    LDLCALC 153 (H) 03/08/2017     Lab Results   Component Value Date    TRIG 69 10/30/2020    TRIG 83 07/20/2018    TRIG 104 03/08/2017     No results found for: [place], [place]   Lab Results   Component Value Date    INR 1 19 06/24/2021    INR 1 57 (H) 05/08/2017    INR 1 84 (H) 04/03/2017    PROTIME 14 9 (H) 06/24/2021    PROTIME 18 9 (H) 05/08/2017    PROTIME 21 1 (H) 04/03/2017        1  Chronic diastolic (congestive) heart failure ([facility] [address] )    2  Permanent atrial fibrillation ([facility] [address] )    3  Essential hypertension    4  Mixed hyperlipidemia        IMPRESSION:   Chronic diastolic heart failure   Permanent atrial fibrillation on Eliquis   Hypertension   Dyslipidemia   PVCs   GERD    PLAN:  It was a pleasure to see Prudence in the office today for follow-up CV evaluation  She is here today due to her diastolic heart failure and permanent atrial fibrillation on Eliquis    She has no symptoms concerning for angina and has been fairly active and walks with a walker  She has not had any significant shortness of breath, but does have increased lower extremity swelling compared to her prior encounter  She has not been taking her Lasix for at least the last couple weeks and has noticed that her blood pressures are now up  She does intermittently take her bisoprolol medication for blood pressure and rate control as she has been doing this for many years  Based on her clinical presentation, I have the following recommendations:    1  Recommend increasing her Lasix to 20 mg months every Monday Wednesday Friday to help with swelling  2  Would obtain 2D echocardiogram to assess cardiac structure and function  3  Continue Eliquis for thromboembolic prophylaxis  4  Continue statin therapy  Goal LDL is less than 70 mg/dL  Repeat lipid panel in 3-6 months  5  Continue bisoprolol and digoxin as prescribed  6  Should she require further antihypertensive control, we may consider initiation of amlodipine  7  We will follow up with her after testing to review the results  As always, please do not hesitate to call with any questions  Portions of the record may have been created with voice recognition software  Occasional wrong word or "sound a like" substitutions may have occurred due to the inherent limitations of voice recognition software  Read the chart carefully and recognize, using context, where substitutions have occurred        Signed: David Le DO, [facility] ROMANA LALA, FERNANDO

## 2022-09-02 ENCOUNTER — OFFICE VISIT (OUTPATIENT)
Dept: CARDIOLOGY CLINIC | Facility: CLINIC | Age: 87
End: 2022-09-02
Payer: MEDICARE

## 2022-09-02 VITALS
SYSTOLIC BLOOD PRESSURE: 160 MMHG | BODY MASS INDEX: 22.96 KG/M2 | WEIGHT: 129.6 LBS | HEIGHT: 63 IN | DIASTOLIC BLOOD PRESSURE: 90 MMHG | HEART RATE: 64 BPM

## 2022-09-02 DIAGNOSIS — I48.21 PERMANENT ATRIAL FIBRILLATION (HCC): ICD-10-CM

## 2022-09-02 DIAGNOSIS — I50.32 CHRONIC DIASTOLIC (CONGESTIVE) HEART FAILURE (HCC): Primary | ICD-10-CM

## 2022-09-02 DIAGNOSIS — E78.2 MIXED HYPERLIPIDEMIA: ICD-10-CM

## 2022-09-02 DIAGNOSIS — R60.0 LOWER EXTREMITY EDEMA: ICD-10-CM

## 2022-09-02 DIAGNOSIS — I10 ESSENTIAL HYPERTENSION: ICD-10-CM

## 2022-09-02 PROCEDURE — 99214 OFFICE O/P EST MOD 30 MIN: CPT | Performed by: INTERNAL MEDICINE

## 2022-09-02 RX ORDER — FUROSEMIDE 20 MG/1
TABLET ORAL
Qty: 30 TABLET | Refills: 2 | Status: SHIPPED | OUTPATIENT
Start: 2022-09-02

## 2022-09-08 ENCOUNTER — HOSPITAL ENCOUNTER (OUTPATIENT)
Dept: NON INVASIVE DIAGNOSTICS | Facility: HOSPITAL | Age: 87
Discharge: HOME | End: 2022-09-08
Attending: INTERNAL MEDICINE
Payer: MEDICARE

## 2022-09-08 VITALS
HEIGHT: 63 IN | BODY MASS INDEX: 22.86 KG/M2 | HEART RATE: 72 BPM | DIASTOLIC BLOOD PRESSURE: 70 MMHG | WEIGHT: 129 LBS | SYSTOLIC BLOOD PRESSURE: 160 MMHG

## 2022-09-08 DIAGNOSIS — I50.32 CHRONIC DIASTOLIC (CONGESTIVE) HEART FAILURE (HCC): ICD-10-CM

## 2022-09-08 PROCEDURE — 93306 TTE W/DOPPLER COMPLETE: CPT | Performed by: STUDENT IN AN ORGANIZED HEALTH CARE EDUCATION/TRAINING PROGRAM

## 2022-09-08 PROCEDURE — 93306 TTE W/DOPPLER COMPLETE: CPT

## 2022-09-09 LAB
AORTIC ROOT: 3.4 CM
AORTIC VALVE MEAN VELOCITY: 10.2 M/S
APICAL FOUR CHAMBER EJECTION FRACTION: 55 %
ASCENDING AORTA: 3.3 CM
AV LVOT MEAN GRADIENT: 1 MMHG
AV LVOT PEAK GRADIENT: 2 MMHG
AV MEAN GRADIENT: 5 MMHG
AV PEAK GRADIENT: 10 MMHG
AV REGURGITATION PRESSURE HALF TIME: 826 MS
AV VELOCITY RATIO: 0.41
DOP CALC AO PEAK VEL: 1.59 M/S
DOP CALC AO VTI: 31.01 CM
DOP CALC LVOT PEAK VEL VTI: 15.42 CM
DOP CALC LVOT PEAK VEL: 0.65 M/S
E WAVE DECELERATION TIME: 203 MS
FRACTIONAL SHORTENING: 22 % (ref 28–44)
INTERVENTRICULAR SEPTUM IN DIASTOLE (PARASTERNAL SHORT AXIS VIEW): 1.1 CM
INTERVENTRICULAR SEPTUM: 1.1 CM (ref 0.6–1.1)
LAAS-AP2: 20.3 CM2
LAAS-AP4: 23.9 CM2
LEFT ATRIUM SIZE: 4.8 CM
LEFT INTERNAL DIMENSION IN SYSTOLE: 3.2 CM (ref 2.1–4)
LEFT VENTRICULAR INTERNAL DIMENSION IN DIASTOLE: 4.1 CM (ref 3.5–6)
LEFT VENTRICULAR POSTERIOR WALL IN END DIASTOLE: 1 CM
LEFT VENTRICULAR STROKE VOLUME: 34 ML
LVSV (TEICH): 34 ML
MV E'TISSUE VEL-LAT: 13 CM/S
MV E'TISSUE VEL-SEP: 7 CM/S
MV PEAK A VEL: 0.35 M/S
MV PEAK E VEL: 128 CM/S
MV STENOSIS PRESSURE HALF TIME: 59 MS
MV VALVE AREA P 1/2 METHOD: 3.73 CM2
RA PRESSURE ESTIMATED: 15 MMHG
RIGHT ATRIAL 2D VOLUME: 128 ML
RIGHT ATRIUM AREA SYSTOLE A4C: 31.8 CM2
RIGHT VENTRICLE ID DIMENSION: 4.4 CM
RV PSP: 41 MMHG
SL CV AV DECELERATION TIME RETROGRADE: 2847 MS
SL CV AV PEAK GRADIENT RETROGRADE: 71 MMHG
SL CV LEFT ATRIUM LENGTH A2C: 6.5 CM
SL CV LV EF: 55
SL CV PED ECHO LEFT VENTRICLE DIASTOLIC VOLUME (MOD BIPLANE) 2D: 76 ML
SL CV PED ECHO LEFT VENTRICLE SYSTOLIC VOLUME (MOD BIPLANE) 2D: 42 ML
TR MAX PG: 26 MMHG
TR PEAK VELOCITY: 2.5 M/S
TRICUSPID VALVE PEAK REGURGITATION VELOCITY: 2.53 M/S

## 2022-11-25 ENCOUNTER — HOSPITAL ENCOUNTER (EMERGENCY)
Facility: HOSPITAL | Age: 87
Discharge: HOME | End: 2022-11-26
Attending: EMERGENCY MEDICINE

## 2022-11-25 ENCOUNTER — APPOINTMENT (EMERGENCY)
Dept: CT IMAGING | Facility: HOSPITAL | Age: 87
End: 2022-11-25
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
## 2022-11-25 DIAGNOSIS — R11.0 NAUSEA: ICD-10-CM

## 2022-11-25 DIAGNOSIS — S09.90XA INJURY OF HEAD, INITIAL ENCOUNTER: Primary | ICD-10-CM

## 2022-11-25 LAB
ANION GAP SERPL CALCULATED.3IONS-SCNC: 10 MMOL/L (ref 4–13)
APTT PPP: 33 SECONDS (ref 23–37)
BASOPHILS # BLD AUTO: 0.04 THOUSANDS/ÂΜL (ref 0–0.1)
BASOPHILS NFR BLD AUTO: 1 % (ref 0–1)
BUN SERPL-MCNC: 15 MG/DL (ref 5–25)
CALCIUM SERPL-MCNC: 8.3 MG/DL (ref 8.3–10.1)
CHLORIDE SERPL-SCNC: 102 MMOL/L (ref 96–108)
CO2 SERPL-SCNC: 29 MMOL/L (ref 21–32)
CREAT SERPL-MCNC: 0.69 MG/DL (ref 0.6–1.3)
DIGOXIN SERPL-MCNC: 0.5 NG/ML (ref 0.8–2)
EOSINOPHIL # BLD AUTO: 0.01 THOUSAND/ÂΜL (ref 0–0.61)
EOSINOPHIL NFR BLD AUTO: 0 % (ref 0–6)
ERYTHROCYTE [DISTWIDTH] IN BLOOD BY AUTOMATED COUNT: 14.8 % (ref 11.6–15.1)
GFR SERPL CREATININE-BSD FRML MDRD: 75 ML/MIN/1.73SQ M
GLUCOSE SERPL-MCNC: 154 MG/DL (ref 65–140)
HCT VFR BLD AUTO: 40.2 % (ref 34.8–46.1)
HGB BLD-MCNC: 12.6 G/DL (ref 11.5–15.4)
IMM GRANULOCYTES # BLD AUTO: 0.03 THOUSAND/UL (ref 0–0.2)
IMM GRANULOCYTES NFR BLD AUTO: 0 % (ref 0–2)
INR PPP: 1.09 (ref 0.84–1.19)
LYMPHOCYTES # BLD AUTO: 0.44 THOUSANDS/ÂΜL (ref 0.6–4.47)
LYMPHOCYTES NFR BLD AUTO: 6 % (ref 14–44)
MCH RBC QN AUTO: 29 PG (ref 26.8–34.3)
MCHC RBC AUTO-ENTMCNC: 31.3 G/DL (ref 31.4–37.4)
MCV RBC AUTO: 93 FL (ref 82–98)
MONOCYTES # BLD AUTO: 0.55 THOUSAND/ÂΜL (ref 0.17–1.22)
MONOCYTES NFR BLD AUTO: 8 % (ref 4–12)
NEUTROPHILS # BLD AUTO: 5.78 THOUSANDS/ÂΜL (ref 1.85–7.62)
NEUTS SEG NFR BLD AUTO: 85 % (ref 43–75)
NRBC BLD AUTO-RTO: 0 /100 WBCS
PLATELET # BLD AUTO: 168 THOUSANDS/UL (ref 149–390)
PMV BLD AUTO: 10.2 FL (ref 8.9–12.7)
POTASSIUM SERPL-SCNC: 4.2 MMOL/L (ref 3.5–5.3)
PROTHROMBIN TIME: 14.1 SECONDS (ref 11.6–14.5)
RBC # BLD AUTO: 4.34 MILLION/UL (ref 3.81–5.12)
SODIUM SERPL-SCNC: 141 MMOL/L (ref 135–147)
WBC # BLD AUTO: 6.85 THOUSAND/UL (ref 4.31–10.16)

## 2022-11-25 RX ORDER — ONDANSETRON 2 MG/ML
4 INJECTION INTRAMUSCULAR; INTRAVENOUS ONCE
Status: COMPLETED | OUTPATIENT
Start: 2022-11-25 | End: 2022-11-25

## 2022-11-25 RX ADMIN — ONDANSETRON 4 MG: 2 INJECTION INTRAMUSCULAR; INTRAVENOUS at 23:34

## 2022-11-25 RX ADMIN — ONDANSETRON 4 MG: 2 INJECTION INTRAMUSCULAR; INTRAVENOUS at 22:12

## 2022-11-26 VITALS
BODY MASS INDEX: 22.65 KG/M2 | DIASTOLIC BLOOD PRESSURE: 79 MMHG | TEMPERATURE: 98.5 F | HEART RATE: 86 BPM | WEIGHT: 127.87 LBS | SYSTOLIC BLOOD PRESSURE: 170 MMHG | OXYGEN SATURATION: 95 % | RESPIRATION RATE: 18 BRPM

## 2022-11-26 RX ORDER — ONDANSETRON 4 MG/1
4 TABLET, ORALLY DISINTEGRATING ORAL EVERY 6 HOURS PRN
Qty: 20 TABLET | Refills: 0 | Status: SHIPPED | OUTPATIENT
Start: 2022-11-26

## 2022-11-26 NOTE — ED NOTES
Patient transported to [facility], [address] [place] [address]  11/25/22 9772

## 2022-11-26 NOTE — ED PROVIDER NOTES
History  Chief Complaint   Patient presents with   • Fall     Patient with unwitnessed fall tonight at home  States she was dizzy tonight and fell and hit her head off of the freezer  Denies LOC  + thinners  Patient alert and oriented x 4  States she hasn't been feeling well the past couple days  + nausea after fall  This is a 77-year-old female with history of AFib on Eliquis and digoxin, CHF, hypertension, hyperlipidemia who presents after a fall  Patient states that she has cervical stenosis so losing her balance and falling is a common problem for her  This evening, patient was closing a door when she turned and lost her balance  She fell backwards and onto her buttock  She hit the back of her head off of a freezer  Patient was having a difficult time getting off of the floor  Fall occurred around 4:30 p m  and family received a call from her at around 7:30 p m  This evening that she had fallen  Patient called her son who is a physician in [place] and told him that she was feeling nauseous after hitting her head  She was instructed to call an ambulance and she was brought to the emergency department for evaluation  Prior to Admission Medications   Prescriptions Last Dose Informant Patient Reported? Taking?    Eliquis 2 5 MG   No Yes   Sig: TAKE 1 TABLET TWICE A DAY   bisoprolol (ZEBETA) 5 mg tablet   No Yes   Sig: Take 0 5 tablets (2 5 mg total) by mouth see administration instructions One half to one tablet daily as directed by physician   Patient taking differently: Take 2 5 mg by mouth see administration instructions One half to one tablet daily as directed by physician as needed 740 systolic or higher   digoxin (LANOXIN) 0 125 mg tablet   No Yes   Sig: TAKE 1 TABLET DAILY   furosemide (LASIX) 20 mg tablet   No Yes   Sig: Take 1 tablet by mouth once daily every Monday, Wednesday and Friday   pravastatin (PRAVACHOL) 20 mg tablet   No Yes   Sig: TAKE 1 TABLET DAILY Facility-Administered Medications: None       Past Medical History:   Diagnosis Date   • A-fib ([address] )    • Fracture of ankle    • Hyperlipidemia    • Hypertension    • Macular degeneration    • Osteoarthritis    • Vitamin D deficiency        Past Surgical History:   Procedure Laterality Date   • APPENDECTOMY     • PERINEAL  PELVIC RECONSTRUCTION SURGERY         Family History   Problem Relation Age of Onset   • Supraventricular tachycardia Family      I have reviewed and agree with the history as documented  E-Cigarette/Vaping   • E-Cigarette Use Never User      E-Cigarette/Vaping Substances   • Nicotine No    • THC No    • CBD No    • Flavoring No    • Unknown No      Social History     Tobacco Use   • Smoking status: Never   • Smokeless tobacco: Never   Vaping Use   • Vaping Use: Never used   Substance Use Topics   • Alcohol use: No     Comment: Social drinker per [employer]   • Drug use: No       Review of Systems   Constitutional: Negative for chills and fever  HENT: Negative for rhinorrhea, sore throat and trouble swallowing  Eyes: Negative for photophobia and visual disturbance  Respiratory: Negative for cough, chest tightness and shortness of breath  Cardiovascular: Negative for chest pain, palpitations and leg swelling  Gastrointestinal: Positive for nausea  Negative for abdominal pain, blood in stool, diarrhea and vomiting  Endocrine: Negative for polyuria  Genitourinary: Negative for dysuria, flank pain, hematuria, vaginal bleeding and vaginal discharge  Musculoskeletal: Negative for back pain and neck pain  Skin: Negative for color change and rash  Allergic/Immunologic: Negative for immunocompromised state  Neurological: Positive for weakness  Negative for dizziness, light-headedness, numbness and headaches  All other systems reviewed and are negative  Physical Exam  Physical Exam  Constitutional:       General: Vital signs are normal       Appearance: Normal appearance  She is well-developed and well-nourished  She is not ill-appearing or toxic-appearing  HENT:      Head: Normocephalic and atraumatic  Nose: Nose normal       Mouth/Throat:      Mouth: Oropharynx is clear and moist and mucous membranes are normal       Pharynx: Uvula midline  Tonsils: No tonsillar exudate  Eyes:      General: Lids are normal       Extraocular Movements: Extraocular movements intact and EOM normal       Conjunctiva/sclera: Conjunctivae normal       Pupils: Pupils are equal, round, and reactive to light  Cardiovascular:      Rate and Rhythm: Normal rate  Rhythm irregularly irregular  Pulmonary:      Effort: Pulmonary effort is normal       Breath sounds: Wheezing present  Chest:      Chest wall: No tenderness, crepitus or bony tenderness  Abdominal:      General: Bowel sounds are normal       Palpations: Abdomen is soft  Abdomen is not rigid  Tenderness: There is no abdominal tenderness  There is no CVA tenderness, guarding or rebound  Musculoskeletal:      Cervical back: Full passive range of motion without pain, normal range of motion and neck supple  No spinous process tenderness or muscular tenderness  Comments: No C-spine or L-spine tenderness  T-spine tenderness noted  No bony tenderness throughout but otherwise noted  No other evidence of trauma  Patient able to range all joints without pain  Pelvis is stable and nontender  Negative LEONARDO/FADIR  Neurological:      Mental Status: She is alert and oriented to person, place, and time  GCS: GCS eye subscore is 4  GCS verbal subscore is 5  GCS motor subscore is 6  Motor: Motor strength is normal       Coordination: She displays a negative Romberg sign  Psychiatric:         Mood and Affect: Mood and affect normal          Speech: Speech normal          Behavior: Behavior normal  Behavior is cooperative  Thought Content:  Thought content normal          Vital Signs  ED Triage Vitals Temperature Pulse Respirations Blood Pressure SpO2   11/25/22 2146 11/25/22 2145 11/25/22 2145 11/25/22 2145 11/25/22 2145   98 5 °F (36 9 °C) 105 18 (!) 194/104 98 %      Temp Source Heart Rate Source Patient Position - Orthostatic VS BP Location FiO2 (%)   11/25/22 2146 11/25/22 2307 11/25/22 2307 11/25/22 2307 --   Oral Monitor Lying Left arm       Pain Score       11/25/22 2307       No Pain           Vitals:    11/25/22 2145 11/25/22 2307   BP: (!) 194/104 (!) 173/80   Pulse: 105 81   Patient Position - Orthostatic VS:  Lying         Visual Acuity      ED Medications  Medications   ondansetron (ZOFRAN) injection 4 mg (4 mg Intravenous Given 11/25/22 2212)   ondansetron (ZOFRAN) injection 4 mg (4 mg Intravenous Given 11/25/22 2334)       Diagnostic Studies  Results Reviewed     Procedure Component Value Units Date/Time    Basic metabolic panel [[id number]]  (Abnormal) Collected: 11/25/22 2214    Lab Status: Final result Specimen: Blood from Arm, Right Updated: 11/25/22 2256     Sodium 141 mmol/L      Potassium 4 2 mmol/L      Chloride 102 mmol/L      CO2 29 mmol/L      ANION GAP 10 mmol/L      BUN 15 mg/dL      Creatinine 0 69 mg/dL      Glucose 154 mg/dL      Calcium 8 3 mg/dL      eGFR 75 ml/min/1 73sq m     Narrative:      [place] guidelines for Chronic Kidney Disease (CKD):   •  Stage 1 with normal or high GFR (GFR > 90 mL/min/1 73 square meters)  •  Stage 2 Mild CKD (GFR = 60-89 mL/min/1 73 square meters)  •  Stage 3A Moderate CKD (GFR = 45-59 mL/min/1 73 square meters)  •  Stage 3B Moderate CKD (GFR = 30-44 mL/min/1 73 square meters)  •  Stage 4 Severe CKD (GFR = 15-29 mL/min/1 73 square meters)  •  Stage 5 End Stage CKD (GFR <15 mL/min/1 73 square meters)  Note: GFR calculation is accurate only with a steady state creatinine    Digoxin level [[id number]]  (Abnormal) Collected: 11/25/22 2214    Lab Status: Final result Specimen: Blood from Arm, Right Updated: 11/25/22 5775 Digoxin Lvl 0 5 ng/mL     Protime-INR [[id number]]  (Normal) Collected: 11/25/22 2214    Lab Status: Final result Specimen: Blood from Arm, Right Updated: 11/25/22 2235     Protime 14 1 seconds      INR 1 09    APTT [[id number]]  (Normal) Collected: 11/25/22 2214    Lab Status: Final result Specimen: Blood from Arm, Right Updated: 11/25/22 2235     PTT 33 seconds     CBC and differential [[id number]]  (Abnormal) Collected: 11/25/22 2214    Lab Status: Final result Specimen: Blood from Arm, Right Updated: 11/25/22 2221     WBC 6 85 Thousand/uL      RBC 4 34 Million/uL      Hemoglobin 12 6 g/dL      Hematocrit 40 2 %      MCV 93 fL      MCH 29 0 pg      MCHC 31 3 g/dL      RDW 14 8 %      MPV 10 2 fL      Platelets 849 Thousands/uL      nRBC 0 /100 WBCs      Neutrophils Relative 85 %      Immat GRANS % 0 %      Lymphocytes Relative 6 %      Monocytes Relative 8 %      Eosinophils Relative 0 %      Basophils Relative 1 %      Neutrophils Absolute 5 78 Thousands/µL      Immature Grans Absolute 0 03 Thousand/uL      Lymphocytes Absolute 0 44 Thousands/µL      Monocytes Absolute 0 55 Thousand/µL      Eosinophils Absolute 0 01 Thousand/µL      Basophils Absolute 0 04 Thousands/µL                  CT head without contrast   Final Result by Aleksandra Wallis MD (11/25 2317)      No acute intracranial abnormality  Chronic microangiopathic changes  Workstation performed: [id number]         CT cervical spine without contrast   Final Result by Aleksandra Wallis MD (11/25 2320)      No cervical spine fracture or traumatic malalignment  Workstation performed: [id number]         CT thoracic spine without contrast   Final Result by Viola Nails DO (11/25 2327)      No vertebral body height loss to suggest acute compression fracture              Workstation performed: [id number]                    Procedures  Procedures         ED Course  ED Course as of 11/26/22 0036   [facility] Nov 25, 2022   2831 Spoke with patient and family regarding results of labs and imaging  Patient still complaining of nausea  Will give another dose of Zofran  Family seems to express concern that she has been very weak lately  Will ambulate the patient  She typically ambulates with a walker  I reassured family that our goal is to ensure that she can safely return home  If not, will need to discuss admission for PT/OT  Sat Nov 26, 2022   0032 Patient able to ambulate using a walker  Patient states that she has a triangle walker at home which she feels is not steady  She felt much more steady using the rectangle base walker  Will attempt to send her home with a walker  Family in agreement  SBIRT 22yo+    Flowsheet Row Most Recent Value   SBIRT (25 yo +)    In order to provide better care to our patients, we are screening all of our patients for alcohol and drug use  Would it be okay to ask you these screening questions? No Filed at: 11/25/2022 1279                    MDM  Number of Diagnoses or Management Options  Diagnosis management comments: Will check CT head, neck, T-spine to evaluate traumatic injury from fall  Will check labs  Treat nausea with Zofran  Disposition pending results  Disposition  Final diagnoses:   Injury of head, initial encounter   Nausea   Fall, initial encounter     Time reflects when diagnosis was documented in both MDM as applicable and the Disposition within this note     Time User Action Codes Description Comment    11/26/2022 12:31 AM Shante VOGEL Add [S09 90XA] Injury of head, initial encounter     11/26/2022 12:31 AM Shante VOGEL Add [R11 0] Nausea     11/26/2022 12:31 AM Napoleon Pettit Add [U87  Cyn Kapoor, initial encounter       ED Disposition     ED Disposition   Discharge    Condition   Stable    Date/Time   Sat Nov 26, 2022 12:31 AM    Comment   Pradip Mckoy discharge to home/self care                 Follow-up Information     Follow up With Specialties Details Why Contact Info Additional Information    Ritesh Bonilla, DO Internal Medicine Schedule an appointment as soon as possible for a visit   [phone] [phone]       [facility] Emergency Department Emergency Medicine Go to  If symptoms worsen [place] [phone]  [address] [facility] Emergency Department, [address] [place] [address], [id number]          Patient's Medications   Discharge Prescriptions    ONDANSETRON (ZOFRAN-ODT) 4 MG DISINTEGRATING TABLET    Take 1 tablet (4 mg total) by mouth every 6 (six) hours as needed for nausea       Start Date: 11/26/2022End Date: --       Order Dose: 4 mg       Quantity: 20 tablet    Refills: 0       No discharge procedures on file      PDMP Review     None          ED Provider  Electronically Signed by           Dara Taylor MD  11/26/22 8099

## 2022-12-05 ENCOUNTER — OFFICE VISIT (OUTPATIENT)
Dept: INTERNAL MEDICINE CLINIC | Facility: CLINIC | Age: 87
End: 2022-12-05

## 2022-12-05 VITALS
HEART RATE: 83 BPM | HEIGHT: 63 IN | RESPIRATION RATE: 18 BRPM | OXYGEN SATURATION: 97 % | WEIGHT: 127 LBS | DIASTOLIC BLOOD PRESSURE: 80 MMHG | BODY MASS INDEX: 22.5 KG/M2 | SYSTOLIC BLOOD PRESSURE: 160 MMHG

## 2022-12-05 DIAGNOSIS — Z77.22 SECOND HAND SMOKE EXPOSURE: ICD-10-CM

## 2022-12-05 DIAGNOSIS — R05.8 POST-VIRAL COUGH SYNDROME: Primary | ICD-10-CM

## 2022-12-05 LAB
SARS-COV-2 AG UPPER RESP QL IA: NEGATIVE
VALID CONTROL: NORMAL

## 2022-12-05 RX ORDER — BENZONATATE 100 MG/1
100 CAPSULE ORAL 3 TIMES DAILY PRN
Qty: 42 CAPSULE | Refills: 0 | Status: SHIPPED | OUTPATIENT
Start: 2022-12-05 | End: 2022-12-19

## 2022-12-05 NOTE — ASSESSMENT & PLAN NOTE
Recent upper respiratory infection - symptoms significantly improved however cough has persisted  At times expectorates yellow to white phlegm  Denies fevers, chills, shortness of breath, chest tightness, chest pain    - clear to auscultation bilateral feels, low suspicion for superimposed bacterial infection  - negative COVID test in the clinic today  - trial of benzonatate  - scheduled with Dr Steffany Wen in approximately 4 weeks  - Patient advised to monitor symptoms for worsening/complications and to contact our office or seek medical care in the emergency room if symptoms are severe

## 2022-12-05 NOTE — ASSESSMENT & PLAN NOTE
Reported exposure to secondhand smoke for about 30 years from  who is now   Follow-up on future visits

## 2022-12-05 NOTE — PROGRESS NOTES
INTERNAL MEDICINE OFFICE VISIT NOTE  [facility] [place]    NAME: Pantera Mercer  AGE: 80 y o  SEX: female    DATE OF ENCOUNTER: 12/5/2022      Chief Complaint   Patient presents with   • Cough     Cough, chest congestion, times 10 days to present       Reports she began experiencing upper respiratory infection symptoms before Thanksgiving  Some symptoms have improved but cough has persisted  White to yellow phlegm at times is expectorated  Denies fevers, chills, sore throat, shortness of breath, chest tightness, chest pain  Of note he was evaluated in emergency room on 11/25 secondary to fall  Imaging was negative for acute injuries  Review of Systems  10 point ROS negative except per HPI    OBJECTIVE:  Vitals:    12/05/22 1146   BP: 160/80   BP Location: Left arm   Patient Position: Sitting   Cuff Size: Standard   Pulse: 83   Resp: 18   SpO2: 97%   Weight: 57 6 kg (127 lb)   Height: 5' 3" (1 6 m)       Physical Exam:   GENERAL: NAD, Normal appearance  Non diaphoretic, non-toxic, not ill-appearing, well-developed, well-nourished  NEUROLOGIC:  Alert/oriented x3  HEENT:  NC/AT, EOMI, MMM, no scleral icterus  CARDIAC:  RRR, +S1/S2, no S3/S4 heard, no m/g/r  PULMONARY:  non-labored breathing, CTA B/L, no wheezing/rales/rhonci appreciated at time of encounter  ABDOMEN:  Soft, NT/ND, +BS, no rebound/guarding/rigidity  Extremities:  2+ Pulses in DP/PT  No edema, cyanosis  SKIN:  No rashes or erythema    ASSESSMENT/PLAN:    1  Post-viral cough syndrome  Assessment & Plan:  Recent upper respiratory infection - symptoms significantly improved however cough has persisted  At times expectorates yellow to white phlegm  Denies fevers, chills, shortness of breath, chest tightness, chest pain    - clear to auscultation bilateral feels, low suspicion for superimposed bacterial infection  - negative COVID test in the clinic today  - trial of benzonatate  - scheduled with Dr Gege Nicholson in approximately 4 weeks  - Patient advised to monitor symptoms for worsening/complications and to contact our office or seek medical care in the emergency room if symptoms are severe        Orders:  -     benzonatate (TESSALON PERLES) 100 mg capsule; Take 1 capsule (100 mg total) by mouth 3 (three) times a day as needed for cough for up to 14 days  -     POCT Rapid Covid Ag    2   Second hand smoke exposure  Assessment & Plan:  Reported exposure to secondhand smoke for about 30 years from  who is now   Follow-up on future visits    Orders:  -     POCT Rapid Covid Ag        Current Outpatient Medications:   •  benzonatate (TESSALON PERLES) 100 mg capsule, Take 1 capsule (100 mg total) by mouth 3 (three) times a day as needed for cough for up to 14 days, Disp: 42 capsule, Rfl: 0  •  bisoprolol (ZEBETA) 5 mg tablet, Take 0 5 tablets (2 5 mg total) by mouth see administration instructions One half to one tablet daily as directed by physician (Patient taking differently: Take 2 5 mg by mouth see administration instructions One half to one tablet daily as directed by physician as needed 716 systolic or higher), Disp: 90 tablet, Rfl: 3  •  digoxin (LANOXIN) 0 125 mg tablet, TAKE 1 TABLET DAILY, Disp: 90 tablet, Rfl: 3  •  Eliquis 2 5 MG, TAKE 1 TABLET TWICE A DAY, Disp: 180 tablet, Rfl: 3  •  ondansetron (ZOFRAN-ODT) 4 mg disintegrating tablet, Take 1 tablet (4 mg total) by mouth every 6 (six) hours as needed for nausea, Disp: 20 tablet, Rfl: 0  •  pravastatin (PRAVACHOL) 20 mg tablet, TAKE 1 TABLET DAILY, Disp: 90 tablet, Rfl: 3  •  furosemide (LASIX) 20 mg tablet, Take 1 tablet by mouth once daily every Monday, Wednesday and Friday (Patient not taking: Reported on 2022), Disp: 30 tablet, Rfl: 2    TCM Call     None      TCM Call     None             Aster Costa MD  [address]  [facility]

## 2022-12-14 ENCOUNTER — NURSE TRIAGE (OUTPATIENT)
Dept: OTHER | Facility: OTHER | Age: 87
End: 2022-12-14

## 2022-12-15 ENCOUNTER — APPOINTMENT (EMERGENCY)
Dept: CT IMAGING | Facility: HOSPITAL | Age: 87
End: 2022-12-15

## 2022-12-15 ENCOUNTER — APPOINTMENT (EMERGENCY)
Dept: RADIOLOGY | Facility: HOSPITAL | Age: 87
End: 2022-12-15

## 2022-12-15 ENCOUNTER — HOSPITAL ENCOUNTER (EMERGENCY)
Facility: HOSPITAL | Age: 87
Discharge: HOME | End: 2022-12-15
Attending: EMERGENCY MEDICINE

## 2022-12-15 VITALS
SYSTOLIC BLOOD PRESSURE: 149 MMHG | TEMPERATURE: 97.6 F | HEART RATE: 77 BPM | RESPIRATION RATE: 17 BRPM | DIASTOLIC BLOOD PRESSURE: 81 MMHG | OXYGEN SATURATION: 96 %

## 2022-12-15 DIAGNOSIS — R07.89 CHEST DISCOMFORT: Primary | ICD-10-CM
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
## 2022-12-15 DIAGNOSIS — S09.90XA CLOSED HEAD INJURY, INITIAL ENCOUNTER: ICD-10-CM

## 2022-12-15 LAB
2HR DELTA HS TROPONIN: 4 NG/L
ALBUMIN SERPL BCP-MCNC: 3.2 G/DL (ref 3.5–5)
ALP SERPL-CCNC: 184 U/L (ref 46–116)
ALT SERPL W P-5'-P-CCNC: 26 U/L (ref 12–78)
ANION GAP SERPL CALCULATED.3IONS-SCNC: 6 MMOL/L (ref 4–13)
APTT PPP: 35 SECONDS (ref 23–37)
AST SERPL W P-5'-P-CCNC: 28 U/L (ref 5–45)
ATRIAL RATE: 214 BPM
ATRIAL RATE: 87 BPM
BASOPHILS # BLD AUTO: 0.07 THOUSANDS/ÂΜL (ref 0–0.1)
BASOPHILS NFR BLD AUTO: 1 % (ref 0–1)
BILIRUB SERPL-MCNC: 0.28 MG/DL (ref 0.2–1)
BUN SERPL-MCNC: 18 MG/DL (ref 5–25)
CALCIUM ALBUM COR SERPL-MCNC: 9.2 MG/DL (ref 8.3–10.1)
CALCIUM SERPL-MCNC: 8.6 MG/DL (ref 8.3–10.1)
CARDIAC TROPONIN I PNL SERPL HS: 13 NG/L
CARDIAC TROPONIN I PNL SERPL HS: 9 NG/L
CHLORIDE SERPL-SCNC: 107 MMOL/L (ref 96–108)
CO2 SERPL-SCNC: 27 MMOL/L (ref 21–32)
CREAT SERPL-MCNC: 0.76 MG/DL (ref 0.6–1.3)
DIGOXIN SERPL-MCNC: 0.8 NG/ML (ref 0.8–2)
EOSINOPHIL # BLD AUTO: 0.16 THOUSAND/ÂΜL (ref 0–0.61)
EOSINOPHIL NFR BLD AUTO: 2 % (ref 0–6)
ERYTHROCYTE [DISTWIDTH] IN BLOOD BY AUTOMATED COUNT: 14.5 % (ref 11.6–15.1)
GFR SERPL CREATININE-BSD FRML MDRD: 68 ML/MIN/1.73SQ M
GLUCOSE SERPL-MCNC: 101 MG/DL (ref 65–140)
HCT VFR BLD AUTO: 38.9 % (ref 34.8–46.1)
HGB BLD-MCNC: 12 G/DL (ref 11.5–15.4)
IMM GRANULOCYTES # BLD AUTO: 0.02 THOUSAND/UL (ref 0–0.2)
IMM GRANULOCYTES NFR BLD AUTO: 0 % (ref 0–2)
INR PPP: 1.16 (ref 0.84–1.19)
LYMPHOCYTES # BLD AUTO: 1.24 THOUSANDS/ÂΜL (ref 0.6–4.47)
LYMPHOCYTES NFR BLD AUTO: 17 % (ref 14–44)
MCH RBC QN AUTO: 28.6 PG (ref 26.8–34.3)
MCHC RBC AUTO-ENTMCNC: 30.8 G/DL (ref 31.4–37.4)
MCV RBC AUTO: 93 FL (ref 82–98)
MONOCYTES # BLD AUTO: 0.59 THOUSAND/ÂΜL (ref 0.17–1.22)
MONOCYTES NFR BLD AUTO: 8 % (ref 4–12)
NEUTROPHILS # BLD AUTO: 5.21 THOUSANDS/ÂΜL (ref 1.85–7.62)
NEUTS SEG NFR BLD AUTO: 72 % (ref 43–75)
NRBC BLD AUTO-RTO: 0 /100 WBCS
PLATELET # BLD AUTO: 262 THOUSANDS/UL (ref 149–390)
PMV BLD AUTO: 10.1 FL (ref 8.9–12.7)
POTASSIUM SERPL-SCNC: 4.3 MMOL/L (ref 3.5–5.3)
PROT SERPL-MCNC: 6.7 G/DL (ref 6.4–8.4)
PROTHROMBIN TIME: 14.8 SECONDS (ref 11.6–14.5)
QRS AXIS: 46 DEGREES
QRS AXIS: 51 DEGREES
QRSD INTERVAL: 78 MS
QRSD INTERVAL: 80 MS
QT INTERVAL: 334 MS
QT INTERVAL: 360 MS
QTC INTERVAL: 390 MS
QTC INTERVAL: 391 MS
RBC # BLD AUTO: 4.2 MILLION/UL (ref 3.81–5.12)
SODIUM SERPL-SCNC: 140 MMOL/L (ref 135–147)
T WAVE AXIS: 36 DEGREES
T WAVE AXIS: 52 DEGREES
VENTRICULAR RATE: 71 BPM
VENTRICULAR RATE: 82 BPM
WBC # BLD AUTO: 7.29 THOUSAND/UL (ref 4.31–10.16)

## 2022-12-15 NOTE — ED ATTENDING ATTESTATION
2022  Vero SHEN DO, saw and evaluated the patient  I have discussed the patient with the resident/non-physician practitioner and agree with the resident's/non-physician practitioner's findings, Plan of Care, and MDM as documented in the resident's/non-physician practitioner's note, except where noted  All available labs and Radiology studies were reviewed  I was present for key portions of any procedure(s) performed by the resident/non-physician practitioner and I was immediately available to provide assistance  At this point I agree with the current assessment done in the Emergency Department  I have conducted an independent evaluation of this patient a history and physical is as follows:    Parker SHEN DO, saw and evaluated the patient  All available labs and X-rays were reviewed  I discussed the patient with the resident / non-physician and agree with the resident's / non-physician practitioner's findings and plan as documented in the resident's / non-physician practicitioner's note, except where noted  At this point, I agree with the current assessment done in the ED      NAME: Claudeen Barber  AGE: 80 y o  SEX: female  : 1930   MRN: [id number]  ENCOUNTER: [id number]    DATE: 12/15/2022  TIME: 6:33 AM      History of Present Illness   Prudence Jane Louis is a 80 y o  female who presents with Fall (Pt arrived via EMS  Per EMS, pt called because she took her Eliquis at 2100 and 10 minutes later felt " brief pressure"  Pt also fell as she was leaving her house to get into the EMS truck, striking the back of her head  Pt reports she had another fall last week when she also hit her head falling back  Pt states " I have a balance problem " Pt has hx of a-fib  Pt denies cp, sob )    has a past medical history of A-fib ([facility] [address] ), Fracture of ankle, Hyperlipidemia, Hypertension, Macular degeneration, Osteoarthritis, and Vitamin D deficiency        Past Medical History     Past Medical History:   Diagnosis Date   • A-fib [facility])    • Fracture of ankle    • Hyperlipidemia    • Hypertension    • Macular degeneration    • Osteoarthritis    • Vitamin D deficiency        Past Surgical History     Past Surgical History:   Procedure Laterality Date   • APPENDECTOMY     • PERINEAL  PELVIC RECONSTRUCTION SURGERY         Social History     Social History     Substance and Sexual Activity   Alcohol Use No    Comment: Social drinker per [employer]     Social History     Substance and Sexual Activity   Drug Use No     Social History     Tobacco Use   Smoking Status Never   Smokeless Tobacco Never       Family History     Family History   Problem Relation Age of Onset   • Supraventricular tachycardia Family        Medications Prior to Admission     Prior to Admission medications    Medication Sig Start Date End Date Taking?  Authorizing Provider   benzonatate (TESSALON PERLES) 100 mg capsule Take 1 capsule (100 mg total) by mouth 3 (three) times a day as needed for cough for up to 14 days 12/5/22 12/19/22  Deanna Valenzuela MD   bisoprolol (ZEBETA) 5 mg tablet Take 0 5 tablets (2 5 mg total) by mouth see administration instructions One half to one tablet daily as directed by physician  Patient taking differently: Take 2 5 mg by mouth see administration instructions One half to one tablet daily as directed by physician as needed 963 systolic or higher 9/30/61   Ritesh Thompson DO   digoxin (LANOXIN) 0 125 mg tablet TAKE 1 TABLET DAILY 3/9/22   Ritesh Thompson DO   Eliquis 2 5 MG TAKE 1 TABLET TWICE A DAY 5/10/22   Gaviota Smith MD   furosemide (LASIX) 20 mg tablet Take 1 tablet by mouth once daily every Monday, Wednesday and Friday  Patient not taking: Reported on 12/5/2022 9/2/22   Adela Kelly DO   ondansetron (ZOFRAN-ODT) 4 mg disintegrating tablet Take 1 tablet (4 mg total) by mouth every 6 (six) hours as needed for nausea 11/26/22   Alix Morales MD   pravastatin (PRAVACHOL) 20 mg tablet TAKE 1 TABLET DAILY 3/9/22   Max Melda Setters, DO       Allergies   No Known Allergies    Objective     Vitals:    12/15/22 0245 12/15/22 0345 12/15/22 0445 12/15/22 0545   BP: (!) 180/78 155/68 155/76 153/69   BP Location: Right arm Right arm Right arm Right arm   Pulse: 72 64 70 56   Resp: 20 17 22 18   Temp:       TempSrc:       SpO2: 96% 96% 94% 97%     There is no height or weight on file to calculate BMI    No intake or output data in the 24 hours ending 12/15/22 4189  Invasive Devices     Peripheral Intravenous Line  Duration           Peripheral IV 12/15/22 Left Forearm <1 day    Peripheral IV 12/15/22 Left Forearm <1 day                Physical Exam  General: awake, alert, no acute distress  Head: normocephalic, atraumatic  Eyes: no scleral icterus  Ears: external ears normal, hearing grossly intact  Nose: external exam grossly normal  Neck: symmetric, No JVD noted, trachea midline  Pulmonary: no respiratory distress, no tachypnea noted  Cardiovascular: appears well perfused  Abdomen: no distention noted  Musculoskeletal: no deformities noted, tone normal  Neuro: grossly non-focal  Psych: mood and affect appropriate    Lab Results:    Labs Reviewed   CBC AND DIFFERENTIAL - Abnormal       Result Value Ref Range Status    WBC 7 29  4 31 - 10 16 Thousand/uL Final    RBC 4 20  3 81 - 5 12 Million/uL Final    Hemoglobin 12 0  11 5 - 15 4 g/dL Final    Hematocrit 38 9  34 8 - 46 1 % Final    MCV 93  82 - 98 fL Final    MCH 28 6  26 8 - 34 3 pg Final    MCHC 30 8 (*) 31 4 - 37 4 g/dL Final    RDW 14 5  11 6 - 15 1 % Final    MPV 10 1  8 9 - 12 7 fL Final    Platelets 796  217 - 390 Thousands/uL Final    nRBC 0  /100 WBCs Final    Neutrophils Relative 72  43 - 75 % Final    Immat GRANS % 0  0 - 2 % Final    Lymphocytes Relative 17  14 - 44 % Final    Monocytes Relative 8  4 - 12 % Final    Eosinophils Relative 2  0 - 6 % Final    Basophils Relative 1  0 - 1 % Final    Neutrophils Absolute 5 21  1 85 - 7 62 Thousands/µL Final    Immature Grans Absolute 0 02  0 00 - 0 20 Thousand/uL Final    Lymphocytes Absolute 1 24  0 60 - 4 47 Thousands/µL Final    Monocytes Absolute 0 59  0 17 - 1 22 Thousand/µL Final    Eosinophils Absolute 0 16  0 00 - 0 61 Thousand/µL Final    Basophils Absolute 0 07  0 00 - 0 10 Thousands/µL Final   PROTIME-INR - Abnormal    Protime 14 8 (*) 11 6 - 14 5 seconds Final    INR 1 16  0 84 - 1 19 Final   COMPREHENSIVE METABOLIC PANEL - Abnormal    Sodium 140  135 - 147 mmol/L Final    Potassium 4 3  3 5 - 5 3 mmol/L Final    Chloride 107  96 - 108 mmol/L Final    CO2 27  21 - 32 mmol/L Final    ANION GAP 6  4 - 13 mmol/L Final    BUN 18  5 - 25 mg/dL Final    Creatinine 0 76  0 60 - 1 30 mg/dL Final    Comment: Standardized to IDMS reference method    Glucose 101  65 - 140 mg/dL Final    Comment: Specimen Lipemic; Results May be Affected  If the patient is fasting, the ADA then defines impaired fasting glucose as > 100 mg/dL and diabetes as > or equal to 123 mg/dL  Specimen collection should occur prior to Sulfasalazine administration due to the potential for falsely depressed results  Specimen collection should occur prior to Sulfapyridine administration due to the potential for falsely elevated results  Calcium 8 6  8 3 - 10 1 mg/dL Final    Corrected Calcium 9 2  8 3 - 10 1 mg/dL Final    AST 28  5 - 45 U/L Final    Comment: Specimen collection should occur prior to Sulfasalazine administration due to the potential for falsely depressed results  ALT 26  12 - 78 U/L Final    Comment: Specimen collection should occur prior to Sulfasalazine administration due to the potential for falsely depressed results       Alkaline Phosphatase 184 (*) 46 - 116 U/L Final    Total Protein 6 7  6 4 - 8 4 g/dL Final    Albumin 3 2 (*) 3 5 - 5 0 g/dL Final    Total Bilirubin 0 28  0 20 - 1 00 mg/dL Final    Comment: Use of this assay is not recommended for patients undergoing treatment with eltrombopag due to the potential for falsely elevated results  eGFR 68  ml/min/1 73sq m Final    Narrative:     [place] guidelines for Chronic Kidney Disease (CKD):   •  Stage 1 with normal or high GFR (GFR > 90 mL/min/1 73 square meters)  •  Stage 2 Mild CKD (GFR = 60-89 mL/min/1 73 square meters)  •  Stage 3A Moderate CKD (GFR = 45-59 mL/min/1 73 square meters)  •  Stage 3B Moderate CKD (GFR = 30-44 mL/min/1 73 square meters)  •  Stage 4 Severe CKD (GFR = 15-29 mL/min/1 73 square meters)  •  Stage 5 End Stage CKD (GFR <15 mL/min/1 73 square meters)  Note: GFR calculation is accurate only with a steady state creatinine   APTT - Normal    PTT 35  23 - 37 seconds Final    Comment: Therapeutic Heparin Range =  60-90 seconds   HS TROPONIN I 0HR - Normal    hs TnI 0hr 9  "Refer to ACS Flowchart"- see link ng/L Final    Comment:                                              Initial (time 0) result  If >=50 ng/L, Myocardial injury suggested ;  Type of myocardial injury and treatment strategy  to be determined  If 5-49 ng/L, a delta result at 2 hours and or 4 hours will be needed to further evaluate  If <4 ng/L, and chest pain has been >3 hours since onset, patient may qualify for discharge based on the HEART score in the ED  If <5 ng/L and <3hours since onset of chest pain, a delta result at 2 hours will be needed to further evaluate  HS Troponin 99th Percentile URL of a Health Population=12 ng/L with a 95% Confidence Interval of 8-18 ng/L  Second Troponin (time 2 hours)  If calculated delta >= 20 ng/L,  Myocardial injury suggested ; Type of myocardial injury and treatment strategy to be determined  If 5-49 ng/L and the calculated delta is 5-19 ng/L, consult medical service for evaluation  Continue evaluation for ischemia on ecg and other possible etiology and repeat hs troponin at 4 hours    If delta is <5 ng/L at 2 hours, consider discharge based on risk stratification via the HEART score (if in ED), or MICHEAL risk score in IP/Observation  HS Troponin 99th Percentile URL of a Health Population=12 ng/L with a 95% Confidence Interval of 8-18 ng/L    DIGOXIN LEVEL - Normal    Digoxin Lvl 0 8  0 8 - 2 0 ng/mL Final   HS TROPONIN I 2HR - Normal    hs TnI 2hr 13  "Refer to ACS Flowchart"- see link ng/L Final    Comment:                                              Initial (time 0) result  If >=50 ng/L, Myocardial injury suggested ;  Type of myocardial injury and treatment strategy  to be determined  If 5-49 ng/L, a delta result at 2 hours and or 4 hours will be needed to further evaluate  If <4 ng/L, and chest pain has been >3 hours since onset, patient may qualify for discharge based on the HEART score in the ED  If <5 ng/L and <3hours since onset of chest pain, a delta result at 2 hours will be needed to further evaluate  HS Troponin 99th Percentile URL of a Health Population=12 ng/L with a 95% Confidence Interval of 8-18 ng/L  Second Troponin (time 2 hours)  If calculated delta >= 20 ng/L,  Myocardial injury suggested ; Type of myocardial injury and treatment strategy to be determined  If 5-49 ng/L and the calculated delta is 5-19 ng/L, consult medical service for evaluation  Continue evaluation for ischemia on ecg and other possible etiology and repeat hs troponin at 4 hours  If delta is <5 ng/L at 2 hours, consider discharge based on risk stratification via the HEART score (if in ED), or MICHEAL risk score in IP/Observation  HS Troponin 99th Percentile URL of a Health Population=12 ng/L with a 95% Confidence Interval of 8-18 ng/L  Delta 2hr hsTnI 4  <20 ng/L Final         Imaging:   CT head without contrast   Final Result      No acute intracranial abnormality  Workstation performed: [id number]         CT cervical spine without contrast   Final Result      No acute cervical spine fracture or traumatic malalignment                     Workstation performed: [id number]         XR chest 1 view portable    (Results Pending)         Medications given in Emergency Department       Assessment and Plan  Chest pain  Fall  Head injury on anticoagulation    Chest pain was brief and is now gone after taking Eliquis  Possible pill esophagitis or partial obstruction but those symptoms are now gone  We will obtain a cardiac work-up given her age and risk factors  Patient fell with witnesses tonight  She was walking with EMS when she fell backwards and hit her head  There is no loss of consciousness and the patient has no outward signs of trauma but since she is on Eliquis we will obtain CT scans of her head and cervical spine  We will also obtain a chest x-ray, continue on the cardiac monitor  Trauma work-up is negative  Delta troponin is negative  While the patient follow-up with her family care physician  Patient does feel comfortable going home  Offered her to stay for possible rehab but the patient does not want this  States that she has plenty of help to help her get around at home  Will return if she does have further symptoms of chest pain  Active Problems:    * No active hospital problems  *      Final Diagnosis:  1  Chest discomfort    2  Fall, initial encounter    3   Closed head injury, initial encounter        ED Course         Critical Care Time  Procedures

## 2022-12-15 NOTE — ED PROVIDER NOTES
History  Chief Complaint   Patient presents with   • Fall     Pt arrived via EMS  Per EMS, pt called because she took her Eliquis at 2100 and 10 minutes later felt " brief pressure"  Pt also fell as she was leaving her house to get into the EMS truck, striking the back of her head  Pt reports she had another fall last week when she also hit her head falling back  Pt states " I have a balance problem " Pt has hx of a-fib  Pt denies cp, sob  HPI   Patient is a 59-year-old female with past medical history A  fib on Eliquis and digoxin, CHF, HTN, HLD, spinal stenosis who presents to the ED for evaluation of palpitations and pressure sensation that began at 9:10 PM tonight and fall with head strike when attempting to get into EMS truck just prior to arrival   Patient's son at bedside reports increasing falls at home, tonight she was attempting to reach out for the EMT when she had a mechanical fall backwards and hit the right side of her head on the ground  Patient was able to get up and ambulate after the fall  Denies any prodromal symptoms  Patient reports at 9 PM she took her evening dose of Eliquis, digoxin, metoprolol, statin and thoughts this may be causing her symptoms but she would "rather be safe than sorry" so she came to the ED for evaluation  Patient denies any current symptoms on exam   She denies any headaches or dizziness, changes in vision or hearing, cough or congestion, runny nose or sore throat, chest pain currently, abdominal pain, nausea or vomiting, dysuria, constipation or diarrhea, numbness tingling or weakness in the extremities, other injuries, or any other complaints or concerns at this time  Prior to Admission Medications   Prescriptions Last Dose Informant Patient Reported? Taking?    Eliquis 2 5 MG   No No   Sig: TAKE 1 TABLET TWICE A DAY   benzonatate (TESSALON PERLES) 100 mg capsule   No No   Sig: Take 1 capsule (100 mg total) by mouth 3 (three) times a day as needed for cough for up to 14 days   bisoprolol (ZEBETA) 5 mg tablet   No No   Sig: Take 0 5 tablets (2 5 mg total) by mouth see administration instructions One half to one tablet daily as directed by physician   Patient taking differently: Take 2 5 mg by mouth see administration instructions One half to one tablet daily as directed by physician as needed 881 systolic or higher   digoxin (LANOXIN) 0 125 mg tablet   No No   Sig: TAKE 1 TABLET DAILY   furosemide (LASIX) 20 mg tablet   No No   Sig: Take 1 tablet by mouth once daily every Monday, Wednesday and Friday   Patient not taking: Reported on 12/5/2022   ondansetron (ZOFRAN-ODT) 4 mg disintegrating tablet   No No   Sig: Take 1 tablet (4 mg total) by mouth every 6 (six) hours as needed for nausea   pravastatin (PRAVACHOL) 20 mg tablet   No No   Sig: TAKE 1 TABLET DAILY      Facility-Administered Medications: None       Past Medical History:   Diagnosis Date   • [facility] ([facility] )    • Fracture of ankle    • Hyperlipidemia    • Hypertension    • Macular degeneration    • Osteoarthritis    • Vitamin D deficiency        Past Surgical History:   Procedure Laterality Date   • APPENDECTOMY     • PERINEAL  PELVIC RECONSTRUCTION SURGERY         Family History   Problem Relation Age of Onset   • Supraventricular tachycardia Family      I have reviewed and agree with the history as documented  E-Cigarette/Vaping   • E-Cigarette Use Never User      E-Cigarette/Vaping Substances   • Nicotine No    • THC No    • CBD No    • Flavoring No    • Unknown No      Social History     Tobacco Use   • Smoking status: Never   • Smokeless tobacco: Never   Vaping Use   • Vaping Use: Never used   Substance Use Topics   • Alcohol use: No     Comment: Social drinker per [employer]   • Drug use: No        Review of Systems   Constitutional: Negative for chills and fever  HENT: Negative for ear pain and sore throat  Eyes: Negative for pain and visual disturbance     Respiratory: Negative for cough and shortness of breath  Cardiovascular: Positive for palpitations  Negative for leg swelling  Gastrointestinal: Negative for abdominal pain and vomiting  Genitourinary: Negative for dysuria and hematuria  Musculoskeletal: Negative for arthralgias and back pain  Skin: Negative for color change and rash  Neurological: Positive for weakness  Negative for seizures and syncope  All other systems reviewed and are negative  Physical Exam  ED Triage Vitals   Temperature Pulse Respirations Blood Pressure SpO2   12/15/22 0121 12/15/22 0015 12/15/22 0015 12/15/22 0017 12/15/22 0015   97 6 °F (36 4 °C) 82 19 154/90 96 %      Temp Source Heart Rate Source Patient Position - Orthostatic VS BP Location FiO2 (%)   12/15/22 0121 12/15/22 0015 12/15/22 0015 12/15/22 0015 --   Oral Monitor Lying Left arm       Pain Score       12/15/22 0145       No Pain             Orthostatic Vital Signs  Vitals:    12/15/22 0015 12/15/22 0017 12/15/22 0145   BP:  154/90 (!) 176/85   Pulse: 82  80   Patient Position - Orthostatic VS: Lying Lying Lying       Physical Exam  Vitals and nursing note reviewed  Constitutional:       General: She is not in acute distress  Appearance: Normal appearance  She is well-developed  She is not ill-appearing or diaphoretic  HENT:      Head: Normocephalic and atraumatic  Mouth/Throat:      Mouth: Mucous membranes are moist       Pharynx: Oropharynx is clear  Comments: Tongue and uvula midline  Eyes:      Extraocular Movements: Extraocular movements intact  Conjunctiva/sclera: Conjunctivae normal       Pupils: Pupils are equal, round, and reactive to light  Cardiovascular:      Rate and Rhythm: Normal rate  Rhythm irregular  Pulses: Normal pulses  Heart sounds: No murmur heard  Pulmonary:      Effort: Pulmonary effort is normal  No respiratory distress  Breath sounds: Normal breath sounds  No wheezing, rhonchi or rales  Chest:      Chest wall: No tenderness     Abdominal: Palpations: Abdomen is soft  Tenderness: There is no abdominal tenderness  There is no guarding or rebound  Musculoskeletal:         General: No swelling, deformity or signs of injury  Normal range of motion  Cervical back: Normal range of motion and neck supple  No rigidity, tenderness or bony tenderness  Thoracic back: No tenderness or bony tenderness  Lumbar back: No tenderness or bony tenderness  Comments: Able to move all extremities with full range of motion and no pain, no obvious deformity or other injury   Skin:     General: Skin is warm and dry  Capillary Refill: Capillary refill takes less than 2 seconds  Findings: No bruising (Or evidence of other injury)  Neurological:      General: No focal deficit present  Mental Status: She is alert and oriented to person, place, and time  Mental status is at baseline  Cranial Nerves: No cranial nerve deficit  Sensory: No sensory deficit  Motor: No weakness  Psychiatric:         Mood and Affect: Mood normal          Behavior: Behavior normal          ED Medications  Medications - No data to display    Diagnostic Studies  Results Reviewed     Procedure Component Value Units Date/Time    HS Troponin 0hr (reflex protocol) [[id number]]  (Normal) Collected: 12/15/22 0041    Lab Status: Final result Specimen: Blood from Arm, Left Updated: 12/15/22 0125     hs TnI 0hr 9 ng/L     HS Troponin I 2hr [[id number]]     Lab Status: No result Specimen: Blood     Protime-INR [[id number]]  (Abnormal) Collected: 12/15/22 0041    Lab Status: Final result Specimen: Blood from Arm, Left Updated: 12/15/22 0113     Protime 14 8 seconds      INR 1 16    APTT [[id number]]  (Normal) Collected: 12/15/22 0041    Lab Status: Final result Specimen: Blood from Arm, Left Updated: 12/15/22 0113     PTT 35 seconds     Comprehensive metabolic panel [[id number]] Collected: 12/15/22 0041    Lab Status:  In process Specimen: Blood from Arm, Left Updated: 12/15/22 0103    Digoxin level [[id number]] Collected: 12/15/22 0041    Lab Status: In process Specimen: Blood from Arm, Left Updated: 12/15/22 0103    CBC and differential [[id number]]  (Abnormal) Collected: 12/15/22 0041    Lab Status: Final result Specimen: Blood from Arm, Left Updated: 12/15/22 0102     WBC 7 29 Thousand/uL      RBC 4 20 Million/uL      Hemoglobin 12 0 g/dL      Hematocrit 38 9 %      MCV 93 fL      MCH 28 6 pg      MCHC 30 8 g/dL      RDW 14 5 %      MPV 10 1 fL      Platelets 518 Thousands/uL      nRBC 0 /100 WBCs      Neutrophils Relative 72 %      Immat GRANS % 0 %      Lymphocytes Relative 17 %      Monocytes Relative 8 %      Eosinophils Relative 2 %      Basophils Relative 1 %      Neutrophils Absolute 5 21 Thousands/µL      Immature Grans Absolute 0 02 Thousand/uL      Lymphocytes Absolute 1 24 Thousands/µL      Monocytes Absolute 0 59 Thousand/µL      Eosinophils Absolute 0 16 Thousand/µL      Basophils Absolute 0 07 Thousands/µL                  CT head without contrast   Final Result by Annie Weiss MD (12/15 0151)      No acute intracranial abnormality  Workstation performed: [id number]         CT cervical spine without contrast   Final Result by Annie Weiss MD (12/15 0155)      No acute cervical spine fracture or traumatic malalignment  Workstation performed: [id number]         XR chest 1 view portable    (Results Pending)         Procedures  Procedures      ED Course  ED Course as of 12/15/22 0212   Thu Dec 15, 2022   0114 CBC and differential(!)  Reviewed, no acute abnormalities, leukocytosis or anemia  Similar compared to prior     0115 PTT: 35   0115 POCT INR: 1 16   0115 PROTIME(!): 14 8   0127 hs TnI 0hr: 9   0155 CT head without contrast  No acute intracranial process   0156 CT cervical spine without contrast  No acute cervical spine fracture or traumatic malalignment   0211 Patient reevaluated, denies any new or worsening complaints or concerns at this time  Denies any chest pain or palpitations  Discussed results and plan with patient and son at bedside who expressed verbal understanding and are agreeable with delta troponin and likely overnight stay  SBIRT 20yo+    Flowsheet Row Most Recent Value   SBIRT (23 yo +)    In order to provide better care to our patients, we are screening all of our patients for alcohol and drug use  Would it be okay to ask you these screening questions? No Filed at: 12/15/2022 0122                MDM  Number of Diagnoses or Management Options  Diagnosis management comments: Patient is a 58-year-old female with past medical history A  fib on Eliquis and digoxin, CHF, HTN, HLD, spinal stenosis who presents to the ED for evaluation of palpitations and pressure sensation that began at 9:10 PM tonight and fall with head strike when attempting to get into EMS truck just prior to arrival     Patient with normal neuro exam, however currently taking Eliquis and 80years of age, will order CT head and neck without contrast to evaluate for injury or acute bleed  CBC, CMP, troponin, digoxin, coags, EKG, chest x-ray ordered  Will reevaluate, see ED course for additional details  Patient care continued by attending Dr Francesco Carbajal pending delta troponin, CMP, digoxin level, and final disposition  Disposition  Final diagnoses:   None     ED Disposition     None      Follow-up Information    None         Patient's Medications   Discharge Prescriptions    No medications on file     No discharge procedures on file  PDMP Review     None           ED Provider  Attending physically available and evaluated Prudence Yana Holman I managed the patient along with the ED Attending      Electronically Signed by         Francisco Ventura DO  12/15/22 0212

## 2022-12-15 NOTE — TELEPHONE ENCOUNTER
Reason for Disposition  • [1] Chest pain (or "angina") comes and goes AND [2] is happening more often (increasing in frequency) or getting worse (increasing in severity) (Exception: chest pains that last only a few seconds)    Answer Assessment - Initial Assessment Questions  1  NAME of MEDICATION: "What medicine are you calling about?"      Eliquis  2  QUESTION: "What is your question?" (e g , medication refill, side effect)      Tonight it is making me heef  3  PRESCRIBING HCP: "Who prescribed it?" Reason: if prescribed by specialist, call should be referred to that group  Original ordered for Dr Patrice Jett  4  SYMPTOMS: "Do you have any symptoms?"      She felt weird in the chest , mild pressure for few seconds on/off and that has been going on for 1 5 hours    5  SEVERITY: If symptoms are present, ask "Are they mild, moderate or severe?"      She took Eliquis, pravastatin, metoprolol and digoxin tonight and now she   Is having a weird feeling in her chest     Protocols used: CHEST PAIN-ADULT-, MEDICATION QUESTION CALL-ADULT-

## 2022-12-15 NOTE — TELEPHONE ENCOUNTER
Regarding: Eliquis/ strange feeling  ----- Message from Brooke Andrade sent at 12/14/2022 10:20 PM EST -----  Pt called, " I am on eliquis  After I took my eliquis, I've been feeling a little strange   This is something I've never experienced before "

## 2023-02-12 ENCOUNTER — TELEPHONE (OUTPATIENT)
Dept: INTERNAL MEDICINE CLINIC | Facility: CLINIC | Age: 88
End: 2023-02-12

## 2023-02-12 NOTE — TELEPHONE ENCOUNTER
Patient is calling regarding cancelling an appointment      Date/Time: 02/13/2023  1:00 pm    Patient was rescheduled: YES [] NO [x]    Patient requesting call back to reschedule: YES [] NO [x]

## 2023-02-27 ENCOUNTER — OFFICE VISIT (OUTPATIENT)
Dept: INTERNAL MEDICINE CLINIC | Facility: CLINIC | Age: 88
End: 2023-02-27

## 2023-02-27 VITALS
DIASTOLIC BLOOD PRESSURE: 90 MMHG | SYSTOLIC BLOOD PRESSURE: 160 MMHG | HEIGHT: 63 IN | BODY MASS INDEX: 22.68 KG/M2 | WEIGHT: 128 LBS | HEART RATE: 92 BPM | RESPIRATION RATE: 18 BRPM | OXYGEN SATURATION: 98 % | TEMPERATURE: 97.3 F

## 2023-02-27 DIAGNOSIS — E78.2 MIXED HYPERLIPIDEMIA: ICD-10-CM

## 2023-02-27 DIAGNOSIS — I48.91 ATRIAL FIBRILLATION, UNSPECIFIED TYPE (HCC): ICD-10-CM

## 2023-02-27 DIAGNOSIS — I48.21 PERMANENT ATRIAL FIBRILLATION (HCC): ICD-10-CM

## 2023-02-27 DIAGNOSIS — J31.0 CHRONIC RHINITIS: ICD-10-CM

## 2023-02-27 DIAGNOSIS — I10 PRIMARY HYPERTENSION: ICD-10-CM

## 2023-02-27 DIAGNOSIS — H35.30 MACULAR DEGENERATION, UNSPECIFIED LATERALITY, UNSPECIFIED TYPE: ICD-10-CM

## 2023-02-27 DIAGNOSIS — I50.32 CHRONIC DIASTOLIC (CONGESTIVE) HEART FAILURE (HCC): Primary | ICD-10-CM

## 2023-02-27 RX ORDER — FLUTICASONE PROPIONATE 50 MCG
1 SPRAY, SUSPENSION (ML) NASAL DAILY
Qty: 15.8 ML | Refills: 0 | Status: SHIPPED | OUTPATIENT
Start: 2023-02-27

## 2023-02-27 NOTE — PROGRESS NOTES
INTERNAL MEDICINE OFFICE VISIT  [facility] Internal Medicine- [place]    NAME: Moe Short  AGE: 80 y o  SEX: female    DATE OF ENCOUNTER: 2/27/2023    Assessment and Plan/History of Present Illness     Here today for 6-month follow-up  Medical history of diastolic CHF, permanent atrial fibrillation, hypertension, hyperlipidemia, PVCs      1  Chronic diastolic (congestive) heart failure (HCC)  Assessment & Plan:  -Intermittently takes Lasix  -Edema seems to be stable on exam today    2D echo completed September 2022 EF 55%, biatrial dilation, mild to moderate mitral regurgitation, moderate tricuspid regurgitation          2  Atrial fibrillation, unspecified type (HCC)  -     CBC and differential; Future  -     Comprehensive metabolic panel; Future  -     TSH, 3rd generation with Free T4 reflex; Future    3  Permanent atrial fibrillation (HCC)  Assessment & Plan:  -rate controlled with digoxin  -on reduced dose Eliquis for stroke prevention  -cardiology previously discussed Watchman procedure with the patient which she declined  -Following with cardiology    For the past 2 to 3 weeks she has had intermittent palpitations  Very brief in nature  She has not had any associated lightheadedness, chest pain  Has chronic dyspnea on exertion  Rhythm appears to be irregular on exam today in line with her history of permanent A-fib  Patient to schedule follow-up with cardiology      4  Mixed hyperlipidemia  Assessment & Plan:  Prior lipid panel October 2020 - total cholesterol 192, TG 69, HDL 76,   -Continue with pravastatin for now  -Recheck lipid panel with next set of labs    Orders:  -     Lipid panel; Future    5  Primary hypertension  Assessment & Plan:  Uses bisoprolol as needed for SBP greater than 150    Patient checks blood pressure at least twice a day at home      6  Chronic rhinitis  Assessment & Plan:  Chronic sinus congestion, rhinorrhea    Patient has not tried any medications or nasal sprays  Suggest trial of nasal saline irrigation followed by Flonase    Orders:  -     fluticasone (FLONASE) 50 mcg/act nasal spray; 1 spray into each nostril daily    7  Macular degeneration, unspecified laterality, unspecified type  Assessment & Plan:  Follows with Dr Cynthia Albarran This Encounter   Procedures   • CBC and differential   • Comprehensive metabolic panel   • Lipid panel   • TSH, 3rd generation with Free T4 reflex       Chief Complaint     Chief Complaint   Patient presents with   • Follow-up     6 month        Review of Systems     10 point ROS negative except per HPI    The following portions of the patient's history were reviewed and updated as appropriate: allergies, current medications, past family history, past medical history, past social history, past surgical history and problem list     Active Problem List     Patient Active Problem List   Diagnosis   • Hyperlipidemia   • Primary hypertension   • Permanent atrial fibrillation (HCC)   • Premature ventricular contraction   • Vitamin D deficiency   • Esophageal reflux   • Ulnar nerve entrapment at elbow   • Numbness of hand   • Lower extremity edema   • Chronic diastolic (congestive) heart failure (HCC)   • Second hand smoke exposure   • Post-viral cough syndrome   • Macular degeneration   • Chronic rhinitis       Objective     /90 (BP Location: Left arm, Patient Position: Sitting, Cuff Size: Standard)   Pulse 92   Temp (!) 97 3 °F (36 3 °C)   Resp 18   Ht 5' 3" (1 6 m)   Wt 58 1 kg (128 lb)   SpO2 98%   BMI 22 67 kg/m²     Physical Exam  Constitutional:       Appearance: Normal appearance  She is not ill-appearing  HENT:      Head: Normocephalic and atraumatic  Eyes:      General: No scleral icterus  Right eye: No discharge  Left eye: No discharge  Cardiovascular:      Rate and Rhythm: Normal rate  Rhythm irregular  Heart sounds: No murmur heard  No friction rub     Pulmonary: Effort: Pulmonary effort is normal       Breath sounds: Normal breath sounds  No wheezing or rales  Abdominal:      General: Abdomen is flat  There is no distension  Palpations: Abdomen is soft  Tenderness: There is no abdominal tenderness  Musculoskeletal:         General: No swelling or tenderness  Right lower leg: Edema present  Left lower leg: Edema present  Skin:     General: Skin is warm and dry  Findings: No erythema  Neurological:      Mental Status: She is alert  Mental status is at baseline  Motor: No weakness  Psychiatric:         Mood and Affect: Mood normal          Behavior: Behavior normal          Pertinent Laboratory/Diagnostic Studies:  XR chest 1 view portable    Result Date: 12/15/2022  Impression: No acute cardiopulmonary disease  Workstation performed: [id number]     CT head without contrast    Result Date: 12/15/2022  Impression: No acute intracranial abnormality  Workstation performed: [id number]     CT cervical spine without contrast    Result Date: 12/15/2022  Impression: No acute cervical spine fracture or traumatic malalignment    Workstation performed: [id number]       Images and diagnostics reviewed     Current Medications     Current Outpatient Medications:   •  bisoprolol (ZEBETA) 5 mg tablet, Take 0 5 tablets (2 5 mg total) by mouth see administration instructions One half to one tablet daily as directed by physician (Patient taking differently: Take 2 5 mg by mouth see administration instructions One half to one tablet daily as directed by physician as needed 364 systolic or higher), Disp: 90 tablet, Rfl: 3  •  digoxin (LANOXIN) 0 125 mg tablet, TAKE 1 TABLET DAILY, Disp: 90 tablet, Rfl: 3  •  Eliquis 2 5 MG, TAKE 1 TABLET TWICE A DAY, Disp: 180 tablet, Rfl: 3  •  fluticasone (FLONASE) 50 mcg/act nasal spray, 1 spray into each nostril daily, Disp: 15 8 mL, Rfl: 0  •  ondansetron (ZOFRAN-ODT) 4 mg disintegrating tablet, Take 1 tablet (4 mg total) by mouth every 6 (six) hours as needed for nausea, Disp: 20 tablet, Rfl: 0  •  pravastatin (PRAVACHOL) 20 mg tablet, TAKE 1 TABLET DAILY, Disp: 90 tablet, Rfl: 3  •  furosemide (LASIX) 20 mg tablet, Take 1 tablet by mouth once daily every Monday, Wednesday and Friday (Patient not taking: Reported on 2/27/2023), Disp: 30 tablet, Rfl: 2    Health Maintenance     Health Maintenance   Topic Date Due   • Pneumococcal Vaccine: 65+ Years (2 - PPSV23 if available, else PCV20) 01/16/2020   • COVID-19 Vaccine (3 - Booster for Moderna series) 04/29/2021   • Depression Screening  08/10/2023   • Fall Risk  08/10/2023   • Urinary Incontinence Screening  08/10/2023   • Medicare Annual Wellness Visit (AWV)  08/10/2023   • BMI: Adult  02/27/2024   • Influenza Vaccine  Completed   • HIB Vaccine  Aged Out   • IPV Vaccine  Aged Out   • Hepatitis A Vaccine  Aged Out   • Meningococcal ACWY Vaccine  Aged Out   • HPV Vaccine  Aged Out     Immunization History   Administered Date(s) Administered   • COVID-19 MODERNA VACC 0 5 ML IM 02/06/2021, 03/04/2021   • DTaP 5 05/11/2011   • H1N1, All Formulations 01/08/2010   • INFLUENZA 11/08/2018, 10/15/2022   • Influenza Split High Dose Preservative Free IM 10/22/2012, 11/19/2013, 10/15/2014, 11/18/2015, 10/20/2016, 10/26/2017   • Influenza, high dose seasonal 0 7 mL 11/08/2018, 10/02/2019, 10/02/2020, 10/12/2021   • Influenza, seasonal, injectable 10/27/2011   • Pneumococcal Conjugate 13-Valent 01/16/2019       HERMELINDA Potter  Internal Medicine [address], [address] [place] [address] [id number]  [facility], [address]   Office: [phone]  Fax: [phone]

## 2023-02-27 NOTE — ASSESSMENT & PLAN NOTE
-Intermittently takes Lasix  -Edema seems to be stable on exam today    2D echo completed September 2022 EF 55%, biatrial dilation, mild to moderate mitral regurgitation, moderate tricuspid regurgitation

## 2023-02-27 NOTE — ASSESSMENT & PLAN NOTE
-rate controlled with digoxin  -on reduced dose Eliquis for stroke prevention  -cardiology previously discussed Watchman procedure with the patient which she declined  -Following with cardiology    For the past 2 to 3 weeks she has had intermittent palpitations  Very brief in nature  She has not had any associated lightheadedness, chest pain  Has chronic dyspnea on exertion  Rhythm appears to be irregular on exam today in line with her history of permanent A-fib    Patient to schedule follow-up with cardiology

## 2023-02-27 NOTE — ASSESSMENT & PLAN NOTE
Chronic sinus congestion, rhinorrhea    Patient has not tried any medications or nasal sprays  Suggest trial of nasal saline irrigation followed by Teddy Gomez

## 2023-02-27 NOTE — ASSESSMENT & PLAN NOTE
Prior lipid panel October 2020 - total cholesterol 192, TG 69, HDL 76,   -Continue with pravastatin for now  -Recheck lipid panel with next set of labs

## 2023-02-28 DIAGNOSIS — I48.91 ATRIAL FIBRILLATION, UNSPECIFIED TYPE (HCC): ICD-10-CM

## 2023-02-28 DIAGNOSIS — E78.5 HYPERLIPIDEMIA, UNSPECIFIED HYPERLIPIDEMIA TYPE: ICD-10-CM

## 2023-03-01 RX ORDER — PRAVASTATIN SODIUM 20 MG
TABLET ORAL
Qty: 90 TABLET | Refills: 3 | Status: SHIPPED | OUTPATIENT
Start: 2023-03-01

## 2023-03-01 RX ORDER — DIGOXIN 125 MCG
TABLET ORAL
Qty: 90 TABLET | Refills: 3 | Status: SHIPPED | OUTPATIENT
Start: 2023-03-01

## 2023-07-10 ENCOUNTER — HOSPITAL ENCOUNTER (EMERGENCY)
Facility: HOSPITAL | Age: 88
Discharge: HOME | End: 2023-07-11
Attending: EMERGENCY MEDICINE
Payer: MEDICARE

## 2023-07-10 ENCOUNTER — APPOINTMENT (EMERGENCY)
Dept: CT IMAGING | Facility: HOSPITAL | Age: 88
End: 2023-07-10
Payer: MEDICARE

## 2023-07-10 VITALS
DIASTOLIC BLOOD PRESSURE: 92 MMHG | SYSTOLIC BLOOD PRESSURE: 173 MMHG | RESPIRATION RATE: 18 BRPM | OXYGEN SATURATION: 97 % | HEART RATE: 74 BPM | TEMPERATURE: 98.4 F

## 2023-07-10 DIAGNOSIS — M54.9 BACK PAIN: ICD-10-CM
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
## 2023-07-10 LAB
ALBUMIN SERPL BCP-MCNC: 4.3 G/DL (ref 3.5–5)
ALP SERPL-CCNC: 140 U/L (ref 34–104)
ALT SERPL W P-5'-P-CCNC: 13 U/L (ref 7–52)
ANION GAP SERPL CALCULATED.3IONS-SCNC: 9 MMOL/L
APTT PPP: 34 SECONDS (ref 23–37)
AST SERPL W P-5'-P-CCNC: 21 U/L (ref 13–39)
ATRIAL RATE: 357 BPM
BASOPHILS # BLD AUTO: 0.07 THOUSANDS/ÂΜL (ref 0–0.1)
BASOPHILS NFR BLD AUTO: 1 % (ref 0–1)
BILIRUB SERPL-MCNC: 0.49 MG/DL (ref 0.2–1)
BUN SERPL-MCNC: 25 MG/DL (ref 5–25)
CALCIUM SERPL-MCNC: 8.9 MG/DL (ref 8.4–10.2)
CHLORIDE SERPL-SCNC: 107 MMOL/L (ref 96–108)
CO2 SERPL-SCNC: 24 MMOL/L (ref 21–32)
CREAT SERPL-MCNC: 0.71 MG/DL (ref 0.6–1.3)
EOSINOPHIL # BLD AUTO: 0.07 THOUSAND/ÂΜL (ref 0–0.61)
EOSINOPHIL NFR BLD AUTO: 1 % (ref 0–6)
ERYTHROCYTE [DISTWIDTH] IN BLOOD BY AUTOMATED COUNT: 14.3 % (ref 11.6–15.1)
GFR SERPL CREATININE-BSD FRML MDRD: 73 ML/MIN/1.73SQ M
GLUCOSE SERPL-MCNC: 103 MG/DL (ref 65–140)
HCT VFR BLD AUTO: 44.6 % (ref 34.8–46.1)
HGB BLD-MCNC: 13.9 G/DL (ref 11.5–15.4)
IMM GRANULOCYTES # BLD AUTO: 0.04 THOUSAND/UL (ref 0–0.2)
IMM GRANULOCYTES NFR BLD AUTO: 1 % (ref 0–2)
INR PPP: 1.11 (ref 0.84–1.19)
LYMPHOCYTES # BLD AUTO: 1.42 THOUSANDS/ÂΜL (ref 0.6–4.47)
LYMPHOCYTES NFR BLD AUTO: 18 % (ref 14–44)
MCH RBC QN AUTO: 29.2 PG (ref 26.8–34.3)
MCHC RBC AUTO-ENTMCNC: 31.2 G/DL (ref 31.4–37.4)
MCV RBC AUTO: 94 FL (ref 82–98)
MONOCYTES # BLD AUTO: 0.63 THOUSAND/ÂΜL (ref 0.17–1.22)
MONOCYTES NFR BLD AUTO: 8 % (ref 4–12)
NEUTROPHILS # BLD AUTO: 5.59 THOUSANDS/ÂΜL (ref 1.85–7.62)
NEUTS SEG NFR BLD AUTO: 71 % (ref 43–75)
NRBC BLD AUTO-RTO: 0 /100 WBCS
PLATELET # BLD AUTO: 224 THOUSANDS/UL (ref 149–390)
PMV BLD AUTO: 10.7 FL (ref 8.9–12.7)
POTASSIUM SERPL-SCNC: 4.1 MMOL/L (ref 3.5–5.3)
PROT SERPL-MCNC: 7.1 G/DL (ref 6.4–8.4)
PROTHROMBIN TIME: 14.3 SECONDS (ref 11.6–14.5)
QRS AXIS: 77 DEGREES
QRSD INTERVAL: 74 MS
QT INTERVAL: 322 MS
QTC INTERVAL: 415 MS
RBC # BLD AUTO: 4.76 MILLION/UL (ref 3.81–5.12)
SODIUM SERPL-SCNC: 140 MMOL/L (ref 135–147)
T WAVE AXIS: 49 DEGREES
VENTRICULAR RATE: 100 BPM
WBC # BLD AUTO: 7.82 THOUSAND/UL (ref 4.31–10.16)

## 2023-07-10 PROCEDURE — 71260 CT THORAX DX C+: CPT

## 2023-07-10 PROCEDURE — 85610 PROTHROMBIN TIME: CPT | Performed by: EMERGENCY MEDICINE

## 2023-07-10 PROCEDURE — 85730 THROMBOPLASTIN TIME PARTIAL: CPT | Performed by: EMERGENCY MEDICINE

## 2023-07-10 PROCEDURE — 74177 CT ABD & PELVIS W/CONTRAST: CPT

## 2023-07-10 PROCEDURE — 99285 EMERGENCY DEPT VISIT HI MDM: CPT

## 2023-07-10 PROCEDURE — 85025 COMPLETE CBC W/AUTO DIFF WBC: CPT | Performed by: EMERGENCY MEDICINE

## 2023-07-10 PROCEDURE — 70450 CT HEAD/BRAIN W/O DYE: CPT

## 2023-07-10 PROCEDURE — 80053 COMPREHEN METABOLIC PANEL: CPT | Performed by: EMERGENCY MEDICINE

## 2023-07-10 PROCEDURE — 36415 COLL VENOUS BLD VENIPUNCTURE: CPT | Performed by: EMERGENCY MEDICINE

## 2023-07-10 PROCEDURE — 72125 CT NECK SPINE W/O DYE: CPT

## 2023-07-10 PROCEDURE — 93005 ELECTROCARDIOGRAM TRACING: CPT

## 2023-07-10 RX ORDER — ACETAMINOPHEN 325 MG/1
650 TABLET ORAL EVERY 6 HOURS PRN
COMMUNITY

## 2023-07-10 RX ADMIN — IOHEXOL 100 ML: 350 INJECTION, SOLUTION INTRAVENOUS at 21:40

## 2023-07-11 NOTE — ED PROVIDER NOTES
Pt Name: Gia Funez  MRN: [id number]  [facility] [place] 7/9/1930  Age/Sex: 80 y.o. female  Date of evaluation: 7/10/2023  PCP: Lukas Cramer    Chief Complaint   Patient presents with   • Fall     Pt came in via EMS from home. Pt reports walking with walker yesterday and the walker folded and she fell on her back. - head strike, - LOC, and + thinners (Eliquis). Pt reports having difficulty ambulating last night and today. Pt complaining of lower back pain when standing. HPI    Prudence presents to the Emergency Department complaining of pain in her back and pelvis and difficulty walking. Yesterday which was her 89rd birthday, she was using her three wheeled collapsible walker when I collapsed and she fell backward. She does not think that she hit her head. She is on anticoagulation. Today she was having a difficult time getting around. HPI      Past Medical and Surgical History    Past Medical History:   Diagnosis Date   • A-fib ([address])    • Fracture of ankle    • Hyperlipidemia    • Hypertension    • Macular degeneration    • Osteoarthritis    • Vitamin D deficiency        Past Surgical History:   Procedure Laterality Date   • APPENDECTOMY     • PERINEAL  PELVIC RECONSTRUCTION SURGERY         Family History   Problem Relation Age of Onset   • Supraventricular tachycardia Family        Social History     Tobacco Use   • Smoking status: Never   • Smokeless tobacco: Never   Vaping Use   • Vaping Use: Never used   Substance Use Topics   • Alcohol use: No     Comment: Social drinker per [employer]   • Drug use: No         .    Allergies    No Known Allergies    Home Medications    Prior to Admission medications    Medication Sig Start Date End Date Taking?  Authorizing Provider   bisoprolol (ZEBETA) 5 mg tablet Take 0.5 tablets (2.5 mg total) by mouth see administration instructions One half to one tablet daily as directed by physician  Patient taking differently: Take 2.5 mg by mouth see administration instructions One half to one tablet daily as directed by physician as needed 811 systolic or higher 3/89/02   Max Claudene Blanco, DO   digoxin (LANOXIN) 0.125 mg tablet TAKE 1 TABLET DAILY 3/1/23   Max Claudene Blanco, DO   Eliquis 2.5 MG TAKE 1 TABLET TWICE A DAY 4/17/23   Yue Damico DO   fluticasone [facility]) 50 mcg/act nasal spray 1 spray into each nostril daily 2/27/23   Max Claudene Blanco, DO   furosemide (LASIX) 20 mg tablet Take 1 tablet by mouth once daily every Monday, Wednesday and Friday  Patient not taking: Reported on 2/27/2023 9/2/22   Yue Damico DO   ondansetron (ZOFRAN-ODT) 4 mg disintegrating tablet Take 1 tablet (4 mg total) by mouth every 6 (six) hours as needed for nausea 11/26/22   Osiel Boone MD   pravastatin (PRAVACHOL) 20 mg tablet TAKE 1 TABLET DAILY 3/1/23   Max Claudene Blanco, DO           Review of Systems    Review of Systems   Constitutional: Negative for chills and fever. HENT: Negative for ear pain and sore throat. Eyes: Negative for pain and visual disturbance. Respiratory: Negative for cough and shortness of breath. Cardiovascular: Negative for chest pain and palpitations. Gastrointestinal: Negative for abdominal pain and vomiting. Genitourinary: Negative for dysuria and hematuria. Musculoskeletal: Positive for gait problem. Negative for arthralgias and back pain. Skin: Negative for color change and rash. Neurological: Negative for seizures and syncope. All other systems reviewed and are negative.         Physical Exam      ED Triage Vitals   Temperature Pulse Respirations Blood Pressure SpO2   07/10/23 2044 07/10/23 2016 07/10/23 2016 07/10/23 2016 07/10/23 2016   98.4 °F (36.9 °C) 102 18 (!) 170/106 96 %      Temp Source Heart Rate Source Patient Position - Orthostatic VS BP Location FiO2 (%)   07/10/23 2044 07/10/23 2016 07/10/23 2016 07/10/23 2016 --   Oral Monitor Lying Right arm       Pain Score       -- Physical Exam  Vitals and nursing note reviewed. Constitutional:       General: She is not in acute distress. Appearance: She is well-developed. HENT:      Head: Normocephalic and atraumatic. Eyes:      Conjunctiva/sclera: Conjunctivae normal.   Cardiovascular:      Rate and Rhythm: Normal rate and regular rhythm. Heart sounds: No murmur heard. Pulmonary:      Effort: Pulmonary effort is normal. No respiratory distress. Breath sounds: Normal breath sounds. Abdominal:      Palpations: Abdomen is soft. Tenderness: There is no abdominal tenderness. Musculoskeletal:         General: No swelling. Cervical back: Neck supple. Skin:     General: Skin is warm and dry. Capillary Refill: Capillary refill takes less than 2 seconds. Neurological:      Mental Status: She is alert. Psychiatric:         Mood and Affect: Mood normal.                Assessment and Plan    Prudence Georgia Biswas is a 80 y.o. female who presents with pain after a fall. Physical examination remarkable for full ROM but does complain of some pelvic pain. Differential diagnosis (not completely inclusive) includes acute traumatic injury. Plan will be to perform diagnostic testing and treat symptomatically. MDM    Diagnostic Results      EKG INTERPRETATION  EKG Interpretation      EKG interpreted by me. Interpretation by Ghislaine Ortiz DO  EKG reviewed and interpreted independently.     Labs:    Results for orders placed or performed during the hospital encounter of 07/10/23   CBC and differential   Result Value Ref Range    WBC 7.82 4.31 - 10.16 Thousand/uL    RBC 4.76 3.81 - 5.12 Million/uL    Hemoglobin 13.9 11.5 - 15.4 g/dL    Hematocrit 44.6 34.8 - 46.1 %    MCV 94 82 - 98 fL    MCH 29.2 26.8 - 34.3 pg    MCHC 31.2 (L) 31.4 - 37.4 g/dL    RDW 14.3 11.6 - 15.1 %    MPV 10.7 8.9 - 12.7 fL    Platelets 589 152 - 244 Thousands/uL    nRBC 0 /100 WBCs    Neutrophils Relative 71 43 - 75 %    Immat GRANS % 1 0 - 2 %    Lymphocytes Relative 18 14 - 44 %    Monocytes Relative 8 4 - 12 %    Eosinophils Relative 1 0 - 6 %    Basophils Relative 1 0 - 1 %    Neutrophils Absolute 5.59 1.85 - 7.62 Thousands/µL    Immature Grans Absolute 0.04 0.00 - 0.20 Thousand/uL    Lymphocytes Absolute 1.42 0.60 - 4.47 Thousands/µL    Monocytes Absolute 0.63 0.17 - 1.22 Thousand/µL    Eosinophils Absolute 0.07 0.00 - 0.61 Thousand/µL    Basophils Absolute 0.07 0.00 - 0.10 Thousands/µL   Comprehensive metabolic panel   Result Value Ref Range    Sodium 140 135 - 147 mmol/L    Potassium 4.1 3.5 - 5.3 mmol/L    Chloride 107 96 - 108 mmol/L    CO2 24 21 - 32 mmol/L    ANION GAP 9 mmol/L    BUN 25 5 - 25 mg/dL    Creatinine 0.71 0.60 - 1.30 mg/dL    Glucose 103 65 - 140 mg/dL    Calcium 8.9 8.4 - 10.2 mg/dL    AST 21 13 - 39 U/L    ALT 13 7 - 52 U/L    Alkaline Phosphatase 140 (H) 34 - 104 U/L    Total Protein 7.1 6.4 - 8.4 g/dL    Albumin 4.3 3.5 - 5.0 g/dL    Total Bilirubin 0.49 0.20 - 1.00 mg/dL    eGFR 73 ml/min/1.73sq m   Protime-INR   Result Value Ref Range    Protime 14.3 11.6 - 14.5 seconds    INR 1.11 0.84 - 1.19   APTT   Result Value Ref Range    PTT 34 23 - 37 seconds   ECG 12 lead   Result Value Ref Range    Ventricular Rate 100 BPM    Atrial Rate 357 BPM    MD Interval  ms    QRSD Interval 74 ms    QT Interval 322 ms    QTC Interval 415 ms    P Axis  degrees    QRS Axis 77 degrees    T Wave Axis 49 degrees       All labs reviewed and utilized in the medical decision making process    Radiology:    CT head wo contrast   Final Result      No acute intracranial abnormality. Chronic microangiopathic changes. Workstation performed: [id number]         CT spine cervical without contrast   Final Result      No cervical spine fracture or traumatic malalignment.                   Workstation performed: [id number]         CT chest abdomen pelvis w contrast   Final Result      No acute traumatic injury identified. Cardiomegaly with enlargement of the pulmonary arteries which may suggest pulmonary artery hypertension. Workstation performed: [id number]         CT recon only thoracolumbar   Final Result      No fracture or traumatic subluxation. Workstation performed: [id number]             All radiology studies independently viewed by me and interpreted by the radiologist.    Procedure    Procedures      ED Course of Care and Re-Assessments      Medications   iohexol (OMNIPAQUE) 350 MG/ML injection (SINGLE-DOSE) 100 mL (100 mL Intravenous Given 7/10/23 2140)     Patient was able to ambulate with a walker in the ED. She has a similar one at home. She feels comfortable going home and family is here to take her. They plan to stay with her tonight. FINAL IMPRESSION    Final diagnoses:   Fall, initial encounter   Back pain         DISPOSITION/PLAN    Time reflects when diagnosis was documented in both MDM as applicable and the Disposition within this note     Time User Action Codes Description Comment    7/10/2023 11:53 PM Yoly Mcintyre Add [J95. OEIQ] Fall, initial encounter     7/10/2023 11:53 PM Yoly Mcintyre Add [M54.9] Back pain       ED Disposition     ED Disposition   Discharge    Condition   Stable    Date/Time   Mon Jul 10, 2023 11:53 PM    Comment   Mundo White discharge to home/self care.                Follow-up Information     Follow up With Specialties Details Why Contact Info Additional Information    Ritesh Flynn,  Internal Medicine Schedule an appointment as soon as possible for a visit   [address] [facility] [address] [phone]       [facility] Emergency Medicine  As needed, If symptoms worsen [address] [address]  [address] [facility] Emergency Department, [address] [facility] [place], [place], [address] [place] [address] REFERRED TO:    Jazz Thurman DO  [address]  [address]  [address] [facility] [address] [phone]    Schedule an appointment as soon as possible for a visit       [address] [facility] Emergency Department  [address] [address] [phone]    As needed, If symptoms worsen      DISCHARGE MEDICATIONS:    Discharge Medication List as of 7/10/2023 11:54 PM      CONTINUE these medications which have NOT CHANGED    Details   acetaminophen (TYLENOL) 325 mg tablet Take 650 mg by mouth every 6 (six) hours as needed for mild pain, Historical Med      digoxin (LANOXIN) 0.125 mg tablet TAKE 1 TABLET DAILY, Normal      Eliquis 2.5 MG TAKE 1 TABLET TWICE A DAY, Normal      bisoprolol (ZEBETA) 5 mg tablet Take 0.5 tablets (2.5 mg total) by mouth see administration instructions One half to one tablet daily as directed by physician, Starting Fri 8/26/2022, Normal      fluticasone (FLONASE) 50 mcg/act nasal spray 1 spray into each nostril daily, Starting Mon 2/27/2023, Normal      furosemide (LASIX) 20 mg tablet Take 1 tablet by mouth once daily every Monday, Wednesday and Friday, Normal      ondansetron (ZOFRAN-ODT) 4 mg disintegrating tablet Take 1 tablet (4 mg total) by mouth every 6 (six) hours as needed for nausea, Starting Sat 11/26/2022, Print      pravastatin (PRAVACHOL) 20 mg tablet TAKE 1 TABLET DAILY, Normal             No discharge procedures on file.          DO Robert Jones Aas, Aas, DO  07/11/23 0113

## 2023-07-12 ENCOUNTER — VBI (OUTPATIENT)
Dept: INTERNAL MEDICINE CLINIC | Facility: CLINIC | Age: 88
End: 2023-07-12

## 2023-07-12 NOTE — TELEPHONE ENCOUNTER
07/12/23 10:25 AM    Patient contacted post ED visit, [facility] department spoke with patient/caregiver and outreach was successful. Thank you.   Nancy Pagan MA  [facility]

## 2023-09-11 DIAGNOSIS — I10 ESSENTIAL HYPERTENSION: ICD-10-CM

## 2023-09-11 RX ORDER — BISOPROLOL FUMARATE 5 MG/1
TABLET, FILM COATED ORAL
Qty: 90 TABLET | Refills: 0 | OUTPATIENT
Start: 2023-09-11

## 2023-09-15 DIAGNOSIS — I10 ESSENTIAL HYPERTENSION: ICD-10-CM

## 2023-09-15 RX ORDER — BISOPROLOL FUMARATE 5 MG/1
2.5 TABLET, FILM COATED ORAL SEE ADMIN INSTRUCTIONS
Qty: 90 TABLET | Refills: 1 | Status: SHIPPED | OUTPATIENT
Start: 2023-09-15 | End: 2023-09-15 | Stop reason: SDUPTHER

## 2023-09-15 RX ORDER — BISOPROLOL FUMARATE 5 MG/1
2.5 TABLET, FILM COATED ORAL SEE ADMIN INSTRUCTIONS
Qty: 14 TABLET | Refills: 0 | Status: SHIPPED | OUTPATIENT
Start: 2023-09-15 | End: 2023-09-20 | Stop reason: SDUPTHER

## 2023-09-20 DIAGNOSIS — I10 ESSENTIAL HYPERTENSION: ICD-10-CM

## 2023-09-20 RX ORDER — BISOPROLOL FUMARATE 5 MG/1
2.5 TABLET, FILM COATED ORAL SEE ADMIN INSTRUCTIONS
Qty: 90 TABLET | Refills: 2 | Status: SHIPPED | OUTPATIENT
Start: 2023-09-20

## 2023-10-02 ENCOUNTER — RA CDI HCC (OUTPATIENT)
Dept: OTHER | Facility: HOSPITAL | Age: 88
End: 2023-10-02

## 2023-10-18 ENCOUNTER — TELEPHONE (OUTPATIENT)
Dept: INTERNAL MEDICINE CLINIC | Facility: CLINIC | Age: 88
End: 2023-10-18

## 2023-10-18 NOTE — TELEPHONE ENCOUNTER
Patient calling and stated, " I am transferring to [address] [place] [address] in [place]. It is closer to my home.   Thanks."

## 2023-10-31 ENCOUNTER — OFFICE VISIT (OUTPATIENT)
Dept: FAMILY MEDICINE CLINIC | Facility: CLINIC | Age: 88
End: 2023-10-31
Payer: MEDICARE

## 2023-10-31 VITALS
SYSTOLIC BLOOD PRESSURE: 126 MMHG | HEIGHT: 63 IN | OXYGEN SATURATION: 96 % | HEART RATE: 94 BPM | BODY MASS INDEX: 22.73 KG/M2 | DIASTOLIC BLOOD PRESSURE: 74 MMHG | WEIGHT: 128.3 LBS | TEMPERATURE: 97.5 F

## 2023-10-31 DIAGNOSIS — Z23 ENCOUNTER FOR IMMUNIZATION: ICD-10-CM

## 2023-10-31 DIAGNOSIS — I48.21 PERMANENT ATRIAL FIBRILLATION (HCC): ICD-10-CM

## 2023-10-31 DIAGNOSIS — Z00.00 MEDICARE ANNUAL WELLNESS VISIT, SUBSEQUENT: Primary | ICD-10-CM

## 2023-10-31 DIAGNOSIS — I50.32 CHRONIC DIASTOLIC (CONGESTIVE) HEART FAILURE (HCC): ICD-10-CM

## 2023-10-31 DIAGNOSIS — H35.30 MACULAR DEGENERATION, UNSPECIFIED LATERALITY, UNSPECIFIED TYPE: ICD-10-CM

## 2023-10-31 DIAGNOSIS — I10 PRIMARY HYPERTENSION: ICD-10-CM

## 2023-10-31 DIAGNOSIS — E78.2 MIXED HYPERLIPIDEMIA: ICD-10-CM

## 2023-10-31 PROCEDURE — 99204 OFFICE O/P NEW MOD 45 MIN: CPT | Performed by: FAMILY MEDICINE

## 2023-10-31 PROCEDURE — 90662 IIV NO PRSV INCREASED AG IM: CPT

## 2023-10-31 PROCEDURE — G0008 ADMIN INFLUENZA VIRUS VAC: HCPCS

## 2023-10-31 PROCEDURE — G0438 PPPS, INITIAL VISIT: HCPCS | Performed by: FAMILY MEDICINE

## 2023-10-31 NOTE — PATIENT INSTRUCTIONS
Medicare Preventive Visit Patient Instructions  Thank you for completing your Welcome to Medicare Visit or Medicare Annual Wellness Visit today. Your next wellness visit will be due in one year (10/31/2024). The screening/preventive services that you may require over the next 5-10 years are detailed below. Some tests may not apply to you based off risk factors and/or age. Screening tests ordered at today's visit but not completed yet may show as past due. Also, please note that scanned in results may not display below. Preventive Screenings:  Service Recommendations Previous Testing/Comments   Colorectal Cancer Screening  * Colonoscopy    * Fecal Occult Blood Test (FOBT)/Fecal Immunochemical Test (FIT)  * Fecal DNA/Cologuard Test  * Flexible Sigmoidoscopy Age: 43-73 years old   Colonoscopy: every 10 years (may be performed more frequently if at higher risk)  OR  FOBT/FIT: every 1 year  OR  Cologuard: every 3 years  OR  Sigmoidoscopy: every 5 years  Screening may be recommended earlier than age 39 if at higher risk for colorectal cancer. Also, an individualized decision between you and your healthcare provider will decide whether screening between the ages of 77-80 would be appropriate. Colonoscopy: Not on file  FOBT/FIT: Not on file  Cologuard: Not on file  Sigmoidoscopy: Not on file    Screening Not Indicated     Breast Cancer Screening Age: 36 years old  Frequency: every 1-2 years  Not required if history of left and right mastectomy Mammogram: Not on file        Cervical Cancer Screening Between the ages of 21-29, pap smear recommended once every 3 years. Between the ages of 32-69, can perform pap smear with HPV co-testing every 5 years.    Recommendations may differ for women with a history of total hysterectomy, cervical cancer, or abnormal pap smears in past. Pap Smear: Not on file    Screening Not Indicated   Hepatitis C Screening Once for adults born between 1945 and 1965  More frequently in patients at high risk for Hepatitis C Hep C Antibody: Not on file        Diabetes Screening 1-2 times per year if you're at risk for diabetes or have pre-diabetes Fasting glucose: 130 mg/dL (5/17/2021)  A1C: No results in last 5 years (No results in last 5 years)  Screening Current   Cholesterol Screening Once every 5 years if you don't have a lipid disorder. May order more often based on risk factors. Lipid panel: 10/30/2020    Screening Not Indicated  History Lipid Disorder     Other Preventive Screenings Covered by Medicare:  Abdominal Aortic Aneurysm (AAA) Screening: covered once if your at risk. You're considered to be at risk if you have a family history of AAA. Lung Cancer Screening: covers low dose CT scan once per year if you meet all of the following conditions: (1) Age 48-67; (2) No signs or symptoms of lung cancer; (3) Current smoker or have quit smoking within the last 15 years; (4) You have a tobacco smoking history of at least 20 pack years (packs per day multiplied by number of years you smoked); (5) You get a written order from a healthcare provider. Glaucoma Screening: covered annually if you're considered high risk: (1) You have diabetes OR (2) Family history of glaucoma OR (3)  aged 48 and older OR (3)  American aged 72 and older  Osteoporosis Screening: covered every 2 years if you meet one of the following conditions: (1) You're estrogen deficient and at risk for osteoporosis based off medical history and other findings; (2) Have a vertebral abnormality; (3) On glucocorticoid therapy for more than 3 months; (4) Have primary hyperparathyroidism; (5) On osteoporosis medications and need to assess response to drug therapy. Last bone density test (DXA Scan): 09/28/1916. HIV Screening: covered annually if you're between the age of 14-79. Also covered annually if you are younger than 13 and older than 72 with risk factors for HIV infection.  For pregnant patients, it is covered up to 3 times per pregnancy. Immunizations:  Immunization Recommendations   Influenza Vaccine Annual influenza vaccination during flu season is recommended for all persons aged >= 6 months who do not have contraindications   Pneumococcal Vaccine   * Pneumococcal conjugate vaccine = PCV13 (Prevnar 13), PCV15 (Vaxneuvance), PCV20 (Prevnar 20)  * Pneumococcal polysaccharide vaccine = PPSV23 (Pneumovax) Adults 04-53 yo with certain risk factors or if 69+ yo  If never received any pneumonia vaccine: recommend Prevnar 20 (PCV20)  Give PCV20 if previously received 1 dose of PCV13 or PPSV23   Hepatitis B Vaccine 3 dose series if at intermediate or high risk (ex: diabetes, end stage renal disease, liver disease)   Respiratory syncytial virus (RSV) Vaccine - COVERED BY MEDICARE PART D  * RSVPreF3 (Arexvy) CDC recommends that adults 61years of age and older may receive a single dose of RSV vaccine using shared clinical decision-making (SCDM)   Tetanus (Td) Vaccine - COST NOT COVERED BY MEDICARE PART B Following completion of primary series, a booster dose should be given every 10 years to maintain immunity against tetanus. Td may also be given as tetanus wound prophylaxis. Tdap Vaccine - COST NOT COVERED BY MEDICARE PART B Recommended at least once for all adults. For pregnant patients, recommended with each pregnancy. Shingles Vaccine (Shingrix) - COST NOT COVERED BY MEDICARE PART B  2 shot series recommended in those 19 years and older who have or will have weakened immune systems or those 50 years and older     Health Maintenance Due:  There are no preventive care reminders to display for this patient. Immunizations Due:      Topic Date Due   • Pneumococcal Vaccine: 65+ Years (2 - PPSV23 if available, else PCV20) 03/13/2019     Advance Directives   What are advance directives? Advance directives are legal documents that state your wishes and plans for medical care.  These plans are made ahead of time in case you lose your ability to make decisions for yourself. Advance directives can apply to any medical decision, such as the treatments you want, and if you want to donate organs. What are the types of advance directives? There are many types of advance directives, and each state has rules about how to use them. You may choose a combination of any of the following:  Living will: This is a written record of the treatment you want. You can also choose which treatments you do not want, which to limit, and which to stop at a certain time. This includes surgery, medicine, IV fluid, and tube feedings. Durable power of  for [facility]): This is a written record that states who you want to make healthcare choices for you when you are unable to make them for yourself. This person, called a proxy, is usually a family member or a friend. You may choose more than 1 proxy. Do not resuscitate (DNR) order:  A DNR order is used in case your heart stops beating or you stop breathing. It is a request not to have certain forms of treatment, such as CPR. A DNR order may be included in other types of advance directives. Medical directive: This covers the care that you want if you are in a coma, near death, or unable to make decisions for yourself. You can list the treatments you want for each condition. Treatment may include pain medicine, surgery, blood transfusions, dialysis, IV or tube feedings, and a ventilator (breathing machine). Values history: This document has questions about your views, beliefs, and how you feel and think about life. This information can help others choose the care that you would choose. Why are advance directives important? An advance directive helps you control your care. Although spoken wishes may be used, it is better to have your wishes written down. Spoken wishes can be misunderstood, or not followed. Treatments may be given even if you do not want them.  An advance directive may make it easier for your family to make difficult choices about your care. Fall Prevention    Fall prevention  includes ways to make your home and other areas safer. It also includes ways you can move more carefully to prevent a fall. Health conditions that cause changes in your blood pressure, vision, or muscle strength and coordination may increase your risk for falls. Medicines may also increase your risk for falls if they make you dizzy, weak, or sleepy. Fall prevention tips:   Stand or sit up slowly. Use assistive devices as directed. Wear shoes that fit well and have soles that . Wear a personal alarm. Stay active. Manage your medical conditions. Home Safety Tips:  Add items to prevent falls in the bathroom. Keep paths clear. Install bright lights in your home. Keep items you use often on shelves within reach. Paint or place reflective tape on the edges of your stairs. © Copyright [employer] 2018 Information is for End User's use only and may not be sold, redistributed or otherwise used for commercial purposes. All illustrations and images included in CareNotes® are the copyrighted property of [employer] or [facility] Preventive Visit Patient Instructions  Thank you for completing your Welcome to Medicare Visit or Medicare Annual Wellness Visit today. Your next wellness visit will be due in one year (10/31/2024). The screening/preventive services that you may require over the next 5-10 years are detailed below. Some tests may not apply to you based off risk factors and/or age. Screening tests ordered at today's visit but not completed yet may show as past due. Also, please note that scanned in results may not display below.   Preventive Screenings:  Service Recommendations Previous Testing/Comments   Colorectal Cancer Screening  * Colonoscopy    * Fecal Occult Blood Test (FOBT)/Fecal Immunochemical Test (FIT)  * Fecal DNA/Cologuard Test  * Flexible Sigmoidoscopy Age: 43-73 years old   Colonoscopy: every 10 years (may be performed more frequently if at higher risk)  OR  FOBT/FIT: every 1 year  OR  Cologuard: every 3 years  OR  Sigmoidoscopy: every 5 years  Screening may be recommended earlier than age 39 if at higher risk for colorectal cancer. Also, an individualized decision between you and your healthcare provider will decide whether screening between the ages of 77-80 would be appropriate. Colonoscopy: Not on file  FOBT/FIT: Not on file  Cologuard: Not on file  Sigmoidoscopy: Not on file    Screening Not Indicated     Breast Cancer Screening Age: 36 years old  Frequency: every 1-2 years  Not required if history of left and right mastectomy Mammogram: Not on file        Cervical Cancer Screening Between the ages of 21-29, pap smear recommended once every 3 years. Between the ages of 32-69, can perform pap smear with HPV co-testing every 5 years. Recommendations may differ for women with a history of total hysterectomy, cervical cancer, or abnormal pap smears in past. Pap Smear: Not on file    Screening Not Indicated   Hepatitis C Screening Once for adults born between 1945 and 1965  More frequently in patients at high risk for Hepatitis C Hep C Antibody: Not on file        Diabetes Screening 1-2 times per year if you're at risk for diabetes or have pre-diabetes Fasting glucose: 130 mg/dL (5/17/2021)  A1C: No results in last 5 years (No results in last 5 years)  Screening Current   Cholesterol Screening Once every 5 years if you don't have a lipid disorder. May order more often based on risk factors. Lipid panel: 10/30/2020    Screening Not Indicated  History Lipid Disorder     Other Preventive Screenings Covered by Medicare:  Abdominal Aortic Aneurysm (AAA) Screening: covered once if your at risk. You're considered to be at risk if you have a family history of AAA.   Lung Cancer Screening: covers low dose CT scan once per year if you meet all of the following conditions: (1) Age 48-67; (2) No signs or symptoms of lung cancer; (3) Current smoker or have quit smoking within the last 15 years; (4) You have a tobacco smoking history of at least 20 pack years (packs per day multiplied by number of years you smoked); (5) You get a written order from a healthcare provider. Glaucoma Screening: covered annually if you're considered high risk: (1) You have diabetes OR (2) Family history of glaucoma OR (3)  aged 48 and older OR (3)  American aged 72 and older  Osteoporosis Screening: covered every 2 years if you meet one of the following conditions: (1) You're estrogen deficient and at risk for osteoporosis based off medical history and other findings; (2) Have a vertebral abnormality; (3) On glucocorticoid therapy for more than 3 months; (4) Have primary hyperparathyroidism; (5) On osteoporosis medications and need to assess response to drug therapy. Last bone density test (DXA Scan): 09/28/1916. HIV Screening: covered annually if you're between the age of 14-79. Also covered annually if you are younger than 13 and older than 72 with risk factors for HIV infection. For pregnant patients, it is covered up to 3 times per pregnancy.     Immunizations:  Immunization Recommendations   Influenza Vaccine Annual influenza vaccination during flu season is recommended for all persons aged >= 6 months who do not have contraindications   Pneumococcal Vaccine   * Pneumococcal conjugate vaccine = PCV13 (Prevnar 13), PCV15 (Vaxneuvance), PCV20 (Prevnar 20)  * Pneumococcal polysaccharide vaccine = PPSV23 (Pneumovax) Adults 02-15 yo with certain risk factors or if 69+ yo  If never received any pneumonia vaccine: recommend Prevnar 20 (PCV20)  Give PCV20 if previously received 1 dose of PCV13 or PPSV23   Hepatitis B Vaccine 3 dose series if at intermediate or high risk (ex: diabetes, end stage renal disease, liver disease)   Respiratory syncytial virus (RSV) Vaccine - COVERED BY MEDICARE PART D  * RSVPreF3 (Arexvy) CDC recommends that adults 61years of age and older may receive a single dose of RSV vaccine using shared clinical decision-making (SCDM)   Tetanus (Td) Vaccine - COST NOT COVERED BY MEDICARE PART B Following completion of primary series, a booster dose should be given every 10 years to maintain immunity against tetanus. Td may also be given as tetanus wound prophylaxis. Tdap Vaccine - COST NOT COVERED BY MEDICARE PART B Recommended at least once for all adults. For pregnant patients, recommended with each pregnancy. Shingles Vaccine (Shingrix) - COST NOT COVERED BY MEDICARE PART B  2 shot series recommended in those 19 years and older who have or will have weakened immune systems or those 50 years and older     Health Maintenance Due:  There are no preventive care reminders to display for this patient. Immunizations Due:      Topic Date Due   • Pneumococcal Vaccine: 65+ Years (2 - PPSV23 if available, else PCV20) 03/13/2019     Advance Directives   What are advance directives? Advance directives are legal documents that state your wishes and plans for medical care. These plans are made ahead of time in case you lose your ability to make decisions for yourself. Advance directives can apply to any medical decision, such as the treatments you want, and if you want to donate organs. What are the types of advance directives? There are many types of advance directives, and each state has rules about how to use them. You may choose a combination of any of the following:  Living will: This is a written record of the treatment you want. You can also choose which treatments you do not want, which to limit, and which to stop at a certain time. This includes surgery, medicine, IV fluid, and tube feedings. Durable power of  for healthcare [facility]):   This is a written record that states who you want to make healthcare choices for you when you are unable to make them for yourself. This person, called a proxy, is usually a family member or a friend. You may choose more than 1 proxy. Do not resuscitate (DNR) order:  A DNR order is used in case your heart stops beating or you stop breathing. It is a request not to have certain forms of treatment, such as CPR. A DNR order may be included in other types of advance directives. Medical directive: This covers the care that you want if you are in a coma, near death, or unable to make decisions for yourself. You can list the treatments you want for each condition. Treatment may include pain medicine, surgery, blood transfusions, dialysis, IV or tube feedings, and a ventilator (breathing machine). Values history: This document has questions about your views, beliefs, and how you feel and think about life. This information can help others choose the care that you would choose. Why are advance directives important? An advance directive helps you control your care. Although spoken wishes may be used, it is better to have your wishes written down. Spoken wishes can be misunderstood, or not followed. Treatments may be given even if you do not want them. An advance directive may make it easier for your family to make difficult choices about your care. Fall Prevention    Fall prevention  includes ways to make your home and other areas safer. It also includes ways you can move more carefully to prevent a fall. Health conditions that cause changes in your blood pressure, vision, or muscle strength and coordination may increase your risk for falls. Medicines may also increase your risk for falls if they make you dizzy, weak, or sleepy. Fall prevention tips:   Stand or sit up slowly. Use assistive devices as directed. Wear shoes that fit well and have soles that . Wear a personal alarm. Stay active. Manage your medical conditions. Home Safety Tips:  Add items to prevent falls in the bathroom. Keep paths clear. Install bright lights in your home. Keep items you use often on shelves within reach. Paint or place reflective tape on the edges of your stairs. © Copyright [employer] 2018 Information is for End User's use only and may not be sold, redistributed or otherwise used for commercial purposes.  All illustrations and images included in CareNotes® are the copyrighted property of [employer] or [address] [place] [address]
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
## 2023-11-07 ENCOUNTER — TELEPHONE (OUTPATIENT)
Dept: FAMILY MEDICINE CLINIC | Facility: CLINIC | Age: 88
End: 2023-11-07

## 2023-11-07 NOTE — TELEPHONE ENCOUNTER
I called [facility] to find out the status on the wheelchair and they advised that it was in review they were in need of the secondary insurance information and I gave this information to them. I also called the daughter Dean Wade to inform of the process.

## 2023-11-16 ENCOUNTER — TELEPHONE (OUTPATIENT)
Dept: FAMILY MEDICINE CLINIC | Facility: CLINIC | Age: 88
End: 2023-11-16

## 2023-11-16 NOTE — TELEPHONE ENCOUNTER
Patients daughter called asking for a refill on pravastatin 20 mg once a day and digoxin 0.125 mg once a day. I do not see the pravastatin on her medication list. [facility]

## 2023-11-18 DIAGNOSIS — E78.2 MIXED HYPERLIPIDEMIA: Primary | ICD-10-CM

## 2023-11-18 DIAGNOSIS — I48.91 ATRIAL FIBRILLATION, UNSPECIFIED TYPE (HCC): ICD-10-CM

## 2023-11-18 RX ORDER — PRAVASTATIN SODIUM 20 MG
20 TABLET ORAL
Qty: 90 TABLET | Refills: 3 | Status: SHIPPED | OUTPATIENT
Start: 2023-11-18 | End: 2023-11-20 | Stop reason: SDUPTHER

## 2023-11-18 RX ORDER — DIGOXIN 125 MCG
125 TABLET ORAL DAILY
Qty: 90 TABLET | Refills: 3 | Status: SHIPPED | OUTPATIENT
Start: 2023-11-18

## 2023-11-20 DIAGNOSIS — E78.2 MIXED HYPERLIPIDEMIA: ICD-10-CM

## 2023-11-20 NOTE — TELEPHONE ENCOUNTER
Requested medication(s) are due for refill today: Yes  Patient has already received a courtesy refill: No  Other reason request has been forwarded to provider: this pt hasn't been seen in over 1 year.

## 2023-11-22 RX ORDER — PRAVASTATIN SODIUM 20 MG
20 TABLET ORAL
Qty: 90 TABLET | Refills: 3 | Status: SHIPPED | OUTPATIENT
Start: 2023-11-22

## 2024-01-24 ENCOUNTER — TELEPHONE (OUTPATIENT)
Dept: ADMINISTRATIVE | Facility: OTHER | Age: 89
End: 2024-01-24

## 2024-01-24 ENCOUNTER — RA CDI HCC (OUTPATIENT)
Dept: OTHER | Facility: HOSPITAL | Age: 89
End: 2024-01-24

## 2024-01-24 NOTE — TELEPHONE ENCOUNTER
01/24/24 4:17 PM    Patient contacted (spoke with patient) to bring Advance Directive, POLST, or Living Will document to next scheduled pcp visit.    Thank you.  Stefani Reyes [facility]

## 2024-02-20 ENCOUNTER — OFFICE VISIT (OUTPATIENT)
Dept: FAMILY MEDICINE CLINIC | Facility: CLINIC | Age: 89
End: 2024-02-20
Payer: MEDICARE

## 2024-02-20 VITALS
HEART RATE: 92 BPM | DIASTOLIC BLOOD PRESSURE: 82 MMHG | OXYGEN SATURATION: 95 % | SYSTOLIC BLOOD PRESSURE: 144 MMHG | TEMPERATURE: 97.7 F | HEIGHT: 63 IN | WEIGHT: 124.4 LBS | BODY MASS INDEX: 22.04 KG/M2

## 2024-02-20 DIAGNOSIS — R11.0 NAUSEA: ICD-10-CM

## 2024-02-20 DIAGNOSIS — H35.30 MACULAR DEGENERATION, UNSPECIFIED LATERALITY, UNSPECIFIED TYPE: ICD-10-CM

## 2024-02-20 DIAGNOSIS — I10 PRIMARY HYPERTENSION: Primary | ICD-10-CM

## 2024-02-20 PROCEDURE — 99214 OFFICE O/P EST MOD 30 MIN: CPT | Performed by: FAMILY MEDICINE

## 2024-02-20 NOTE — PROGRESS NOTES
Subjective:    ERIK Akins is a 89 y.o. female here today for:  Chief Complaint   Patient presents with    Follow-up    Fall     Patient states that about a week ago she hit her head with her drawer, she felt fine and now she feels lousy. And her head on the right side feels weird   .      ---Above per clinical staff & reviewed. ---  HPI:  89-year-old female here for follow-up.  Patient is here with daughter.  Patient states she fell about a week ago and did hit her head.  Patient states she has some aches on her shoulder but otherwise no significant complaints.  Patient does state that she is not feeling well today.  She does state that she has a caretaker who may have brought her a virus.  Patient states she feels a bit sick to her stomach.  Patient has eaten both breakfast and lunch today without any vomiting.  Patient had also been on antibiotics for dental issue.  Discussed ginger ale or Nickie-Auxier for upset stomach.  Declined Zofran.  Will see patient back in 3 to 4 months for follow-up.  Advised daughter that if there are any problems she can always schedule virtual also.    The following portions of the patient's history were reviewed and updated as appropriate: allergies, current medications, past family history, past medical history, past social history, past surgical history and problem list.    Past Medical History:   Diagnosis Date    A-fib (HCC)     Fracture of ankle     Hyperlipidemia     Hypertension     Macular degeneration     Osteoarthritis     Vitamin D deficiency        Past Surgical History:   Procedure Laterality Date    APPENDECTOMY      PERINEAL  PELVIC RECONSTRUCTION SURGERY         Social History     Socioeconomic History    Marital status:      Spouse name: None    Number of children: None    Years of education: None    Highest education level: None   Occupational History    None   Tobacco Use    Smoking status: Never    Smokeless tobacco: Never   Vaping Use    Vaping status:  Never Used   Substance and Sexual Activity    Alcohol use: No     Comment: Social drinker per [employer]    Drug use: No    Sexual activity: Not Currently   Other Topics Concern    None   Social History Narrative    None     Social Determinants of Health     Financial Resource Strain: Low Risk  (10/31/2023)    Overall Financial Resource Strain (CARDIA)     Difficulty of Paying Living Expenses: Not hard at all   Food Insecurity: Not on file   Transportation Needs: No Transportation Needs (10/31/2023)    PRAPARE - Transportation     Lack of Transportation (Medical): No     Lack of Transportation (Non-Medical): No   Physical Activity: Not on file   Stress: Not on file   Social Connections: Not on file   Intimate Partner Violence: Not on file   Housing Stability: Not on file       Current Outpatient Medications   Medication Sig Dispense Refill    acetaminophen (TYLENOL) 325 mg tablet Take 650 mg by mouth every 6 (six) hours as needed for mild pain      bisoprolol (ZEBETA) 5 mg tablet Take 0.5 tablets (2.5 mg total) by mouth see administration instructions One half to one tablet daily as directed by physician 90 tablet 2    digoxin (LANOXIN) 0.125 mg tablet Take 1 tablet (125 mcg total) by mouth daily 90 tablet 3    Eliquis 2.5 MG TAKE 1 TABLET TWICE A  tablet 3    pravastatin (PRAVACHOL) 20 mg tablet Take 1 tablet (20 mg total) by mouth daily at bedtime 90 tablet 3     No current facility-administered medications for this visit.        Review of Systems   Constitutional: Negative.  Negative for chills and fever.   HENT: Negative.  Negative for ear pain and sore throat.    Eyes:  Negative for pain and visual disturbance.   Respiratory: Negative.  Negative for cough and shortness of breath.    Cardiovascular: Negative.  Negative for chest pain and palpitations.   Gastrointestinal:  Positive for nausea. Negative for abdominal pain and vomiting.   Genitourinary: Negative.  Negative for dysuria and hematuria.  "  Musculoskeletal:  Positive for gait problem. Negative for arthralgias and back pain.   Skin:  Negative for color change and rash.   Neurological:  Negative for seizures and syncope.   Psychiatric/Behavioral: Negative.     All other systems reviewed and are negative.       Objective:    /82 (BP Location: Left arm, Patient Position: Sitting, Cuff Size: Adult)   Pulse 92   Temp 97.7 °F (36.5 °C) (Temporal)   Ht 5' 3\" (1.6 m)   Wt 56.4 kg (124 lb 6.4 oz)   SpO2 95%   BMI 22.04 kg/m²   Wt Readings from Last 3 Encounters:   02/20/24 56.4 kg (124 lb 6.4 oz)   10/31/23 58.2 kg (128 lb 4.8 oz)   02/27/23 58.1 kg (128 lb)     BP Readings from Last 3 Encounters:   02/20/24 144/82   10/31/23 126/74   07/10/23 (!) 173/92       Lab Results   Component Value Date    WBC 7.82 07/10/2023    HGB 13.9 07/10/2023    HCT 44.6 07/10/2023     07/10/2023    CHOL 238 07/07/2015    TRIG 69 10/30/2020    HDL 76 10/30/2020    LDLDIRECT 122 (H) 11/20/2017    ALT 13 07/10/2023    AST 21 07/10/2023     02/12/2015    K 4.1 07/10/2023     07/10/2023    CREATININE 0.71 07/10/2023    BUN 25 07/10/2023    CO2 24 07/10/2023    INR 1.11 07/10/2023    GLUF 130 (H) 05/17/2021       Physical Exam  Vitals and nursing note reviewed.   Constitutional:       Appearance: Normal appearance. She is well-developed.   HENT:      Head: Normocephalic and atraumatic.   Eyes:      Pupils: Pupils are equal, round, and reactive to light.   Cardiovascular:      Rate and Rhythm: Normal rate and regular rhythm.      Heart sounds: No murmur heard.  Pulmonary:      Effort: Pulmonary effort is normal.      Breath sounds: Normal breath sounds.   Musculoskeletal:      Cervical back: Normal range of motion and neck supple.   Skin:     General: Skin is warm.      Capillary Refill: Capillary refill takes less than 2 seconds.   Neurological:      Mental Status: She is alert and oriented to person, place, and time.      Cranial Nerves: No cranial " nerve deficit.   Psychiatric:         Mood and Affect: Mood normal.         Thought Content: Thought content normal.                       Assessment/Plan:   Paolance was seen today for follow-up and fall.    Diagnoses and all orders for this visit:    Primary hypertension    Macular degeneration, unspecified laterality, unspecified type    Nausea      Return in about 4 months (around 6/20/2024) for Recheck.  There are no Patient Instructions on file for this visit.

## 2024-02-25 ENCOUNTER — TELEPHONE (OUTPATIENT)
Dept: OTHER | Facility: OTHER | Age: 89
End: 2024-02-25

## 2024-02-25 NOTE — TELEPHONE ENCOUNTER
Patient's daughter is calling regarding cancelling an appointment.    Date/Time: 02/26/2024 8:40 am    Patient was rescheduled: YES [] NO [x]    Patient requesting call back to reschedule: YES [x] NO []

## 2024-03-12 ENCOUNTER — APPOINTMENT (EMERGENCY)
Dept: CT IMAGING | Facility: HOSPITAL | Age: 89
DRG: 605 | End: 2024-03-12
Payer: MEDICARE

## 2024-03-12 ENCOUNTER — APPOINTMENT (EMERGENCY)
Dept: RADIOLOGY | Facility: HOSPITAL | Age: 89
DRG: 605 | End: 2024-03-12
Payer: MEDICARE

## 2024-03-12 ENCOUNTER — HOSPITAL ENCOUNTER (INPATIENT)
Facility: HOSPITAL | Age: 89
LOS: 3 days | Discharge: NURSING, REHAB OR LONG-TERM CARE | DRG: 605 | End: 2024-03-16
Attending: EMERGENCY MEDICINE | Admitting: INTERNAL MEDICINE
Payer: MEDICARE

## 2024-03-12 DIAGNOSIS — Z77.22 SECOND HAND SMOKE EXPOSURE: ICD-10-CM

## 2024-03-12 DIAGNOSIS — E53.8 VITAMIN B 12 DEFICIENCY: ICD-10-CM

## 2024-03-12 DIAGNOSIS — M25.552 LEFT HIP PAIN: ICD-10-CM

## 2024-03-12 DIAGNOSIS — Z86.73 HISTORY OF CVA (CEREBROVASCULAR ACCIDENT): ICD-10-CM

## 2024-03-12 DIAGNOSIS — R26.2 AMBULATORY DYSFUNCTION: ICD-10-CM
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
## 2024-03-12 DIAGNOSIS — R20.0 NUMBNESS OF HAND: ICD-10-CM

## 2024-03-12 PROBLEM — M25.559 HIP PAIN: Status: ACTIVE | Noted: 2024-03-12

## 2024-03-12 PROBLEM — D25.9 UTERINE MYOMA: Status: ACTIVE | Noted: 2024-03-12

## 2024-03-12 LAB
ABO GROUP BLD: NORMAL
ANION GAP SERPL CALCULATED.3IONS-SCNC: 7 MMOL/L (ref 4–13)
APTT PPP: 32 SECONDS (ref 23–37)
ATRIAL RATE: 63 BPM
BASOPHILS # BLD AUTO: 0.04 THOUSANDS/ÂΜL (ref 0–0.1)
BASOPHILS NFR BLD AUTO: 1 % (ref 0–1)
BLD GP AB SCN SERPL QL: NEGATIVE
BUN SERPL-MCNC: 18 MG/DL (ref 5–25)
CALCIUM SERPL-MCNC: 9 MG/DL (ref 8.4–10.2)
CHLORIDE SERPL-SCNC: 107 MMOL/L (ref 96–108)
CO2 SERPL-SCNC: 25 MMOL/L (ref 21–32)
CREAT SERPL-MCNC: 0.68 MG/DL (ref 0.6–1.3)
EOSINOPHIL # BLD AUTO: 0.06 THOUSAND/ÂΜL (ref 0–0.61)
EOSINOPHIL NFR BLD AUTO: 1 % (ref 0–6)
ERYTHROCYTE [DISTWIDTH] IN BLOOD BY AUTOMATED COUNT: 14.2 % (ref 11.6–15.1)
GFR SERPL CREATININE-BSD FRML MDRD: 75 ML/MIN/1.73SQ M
GLUCOSE SERPL-MCNC: 116 MG/DL (ref 65–140)
HCT VFR BLD AUTO: 41.1 % (ref 34.8–46.1)
HGB BLD-MCNC: 13.2 G/DL (ref 11.5–15.4)
IMM GRANULOCYTES # BLD AUTO: 0.01 THOUSAND/UL (ref 0–0.2)
IMM GRANULOCYTES NFR BLD AUTO: 0 % (ref 0–2)
INR PPP: 1.05 (ref 0.84–1.19)
LYMPHOCYTES # BLD AUTO: 0.88 THOUSANDS/ÂΜL (ref 0.6–4.47)
LYMPHOCYTES NFR BLD AUTO: 15 % (ref 14–44)
MCH RBC QN AUTO: 29.8 PG (ref 26.8–34.3)
MCHC RBC AUTO-ENTMCNC: 32.1 G/DL (ref 31.4–37.4)
MCV RBC AUTO: 93 FL (ref 82–98)
MONOCYTES # BLD AUTO: 0.46 THOUSAND/ÂΜL (ref 0.17–1.22)
MONOCYTES NFR BLD AUTO: 8 % (ref 4–12)
NEUTROPHILS # BLD AUTO: 4.49 THOUSANDS/ÂΜL (ref 1.85–7.62)
NEUTS SEG NFR BLD AUTO: 75 % (ref 43–75)
NRBC BLD AUTO-RTO: 0 /100 WBCS
PLATELET # BLD AUTO: 189 THOUSANDS/UL (ref 149–390)
PMV BLD AUTO: 10.3 FL (ref 8.9–12.7)
POTASSIUM SERPL-SCNC: 4.3 MMOL/L (ref 3.5–5.3)
PROTHROMBIN TIME: 13.9 SECONDS (ref 11.6–14.5)
QRS AXIS: 106 DEGREES
QRSD INTERVAL: 82 MS
QT INTERVAL: 378 MS
QTC INTERVAL: 393 MS
RBC # BLD AUTO: 4.43 MILLION/UL (ref 3.81–5.12)
RH BLD: POSITIVE
SODIUM SERPL-SCNC: 139 MMOL/L (ref 135–147)
SPECIMEN EXPIRATION DATE: NORMAL
T WAVE AXIS: 9 DEGREES
VENTRICULAR RATE: 65 BPM
WBC # BLD AUTO: 5.94 THOUSAND/UL (ref 4.31–10.16)

## 2024-03-12 PROCEDURE — 86850 RBC ANTIBODY SCREEN: CPT | Performed by: EMERGENCY MEDICINE

## 2024-03-12 PROCEDURE — 86901 BLOOD TYPING SEROLOGIC RH(D): CPT | Performed by: EMERGENCY MEDICINE

## 2024-03-12 PROCEDURE — 93010 ELECTROCARDIOGRAM REPORT: CPT | Performed by: INTERNAL MEDICINE

## 2024-03-12 PROCEDURE — 99285 EMERGENCY DEPT VISIT HI MDM: CPT | Performed by: EMERGENCY MEDICINE

## 2024-03-12 PROCEDURE — 80048 BASIC METABOLIC PNL TOTAL CA: CPT | Performed by: EMERGENCY MEDICINE

## 2024-03-12 PROCEDURE — 99285 EMERGENCY DEPT VISIT HI MDM: CPT

## 2024-03-12 PROCEDURE — 36415 COLL VENOUS BLD VENIPUNCTURE: CPT | Performed by: EMERGENCY MEDICINE

## 2024-03-12 PROCEDURE — 93005 ELECTROCARDIOGRAM TRACING: CPT

## 2024-03-12 PROCEDURE — 85025 COMPLETE CBC W/AUTO DIFF WBC: CPT | Performed by: EMERGENCY MEDICINE

## 2024-03-12 PROCEDURE — 96375 TX/PRO/DX INJ NEW DRUG ADDON: CPT

## 2024-03-12 PROCEDURE — 96374 THER/PROPH/DIAG INJ IV PUSH: CPT

## 2024-03-12 PROCEDURE — 70450 CT HEAD/BRAIN W/O DYE: CPT

## 2024-03-12 PROCEDURE — 72192 CT PELVIS W/O DYE: CPT

## 2024-03-12 PROCEDURE — 86900 BLOOD TYPING SEROLOGIC ABO: CPT | Performed by: EMERGENCY MEDICINE

## 2024-03-12 PROCEDURE — 73502 X-RAY EXAM HIP UNI 2-3 VIEWS: CPT

## 2024-03-12 PROCEDURE — 85730 THROMBOPLASTIN TIME PARTIAL: CPT | Performed by: EMERGENCY MEDICINE

## 2024-03-12 PROCEDURE — 85610 PROTHROMBIN TIME: CPT | Performed by: EMERGENCY MEDICINE

## 2024-03-12 RX ORDER — ACETAMINOPHEN 325 MG/1
975 TABLET ORAL EVERY 8 HOURS SCHEDULED
Status: DISCONTINUED | OUTPATIENT
Start: 2024-03-12 | End: 2024-03-16 | Stop reason: HOSPADM

## 2024-03-12 RX ORDER — ONDANSETRON 2 MG/ML
4 INJECTION INTRAMUSCULAR; INTRAVENOUS EVERY 6 HOURS PRN
Status: DISCONTINUED | OUTPATIENT
Start: 2024-03-12 | End: 2024-03-16 | Stop reason: HOSPADM

## 2024-03-12 RX ORDER — BISOPROLOL FUMARATE 5 MG/1
2.5 TABLET, FILM COATED ORAL DAILY
Status: DISCONTINUED | OUTPATIENT
Start: 2024-03-13 | End: 2024-03-16 | Stop reason: HOSPADM

## 2024-03-12 RX ORDER — PRAVASTATIN SODIUM 20 MG
20 TABLET ORAL
Status: DISCONTINUED | OUTPATIENT
Start: 2024-03-12 | End: 2024-03-16 | Stop reason: HOSPADM

## 2024-03-12 RX ORDER — DIGOXIN 125 MCG
125 TABLET ORAL
Status: DISCONTINUED | OUTPATIENT
Start: 2024-03-12 | End: 2024-03-16 | Stop reason: HOSPADM

## 2024-03-12 RX ORDER — HYDROMORPHONE HCL IN WATER/PF 6 MG/30 ML
0.2 PATIENT CONTROLLED ANALGESIA SYRINGE INTRAVENOUS ONCE
Status: COMPLETED | OUTPATIENT
Start: 2024-03-12 | End: 2024-03-12

## 2024-03-12 RX ORDER — KETOROLAC TROMETHAMINE 30 MG/ML
15 INJECTION, SOLUTION INTRAMUSCULAR; INTRAVENOUS ONCE
Status: COMPLETED | OUTPATIENT
Start: 2024-03-12 | End: 2024-03-12

## 2024-03-12 RX ORDER — ONDANSETRON 2 MG/ML
4 INJECTION INTRAMUSCULAR; INTRAVENOUS ONCE
Status: COMPLETED | OUTPATIENT
Start: 2024-03-12 | End: 2024-03-12

## 2024-03-12 RX ORDER — HYDROMORPHONE HCL IN WATER/PF 6 MG/30 ML
0.2 PATIENT CONTROLLED ANALGESIA SYRINGE INTRAVENOUS EVERY 6 HOURS PRN
Status: DISCONTINUED | OUTPATIENT
Start: 2024-03-12 | End: 2024-03-16 | Stop reason: HOSPADM

## 2024-03-12 RX ADMIN — ONDANSETRON 4 MG: 2 INJECTION INTRAMUSCULAR; INTRAVENOUS at 16:27

## 2024-03-12 RX ADMIN — KETOROLAC TROMETHAMINE 15 MG: 30 INJECTION, SOLUTION INTRAMUSCULAR; INTRAVENOUS at 17:43

## 2024-03-12 RX ADMIN — ACETAMINOPHEN 325MG 975 MG: 325 TABLET ORAL at 22:57

## 2024-03-12 RX ADMIN — MORPHINE SULFATE 2 MG: 2 INJECTION, SOLUTION INTRAMUSCULAR; INTRAVENOUS at 16:29

## 2024-03-12 RX ADMIN — DIGOXIN 125 MCG: 125 TABLET ORAL at 23:44

## 2024-03-12 RX ADMIN — PRAVASTATIN SODIUM 20 MG: 20 TABLET ORAL at 23:44

## 2024-03-12 RX ADMIN — HYDROMORPHONE HYDROCHLORIDE 0.2 MG: 0.2 INJECTION, SOLUTION INTRAMUSCULAR; INTRAVENOUS; SUBCUTANEOUS at 21:54

## 2024-03-12 RX ADMIN — APIXABAN 2.5 MG: 2.5 TABLET, FILM COATED ORAL at 23:44

## 2024-03-12 NOTE — ED PROVIDER NOTES
History  Chief Complaint   Patient presents with    Hip Pain     Pt was moving 'hospital cart' and states she has been unsteady for the last several days, applied too much pressure on to cart and fell, reports L hip pain, has shortened and externally rotated L leg.      89 yo F brought by ambulance from home after a fall.  She was pushing a cart in her home, that got away from her, causing her to fall and becoming essentially wedged against the wall and piano. She was able to call her daughter who got to her after about 20 mins, and then EMS was able to extricate her and transport her.  She does recall hitting her head, and c/o some nausea.  She is on eliquis.  She c/o pain localizing to left posterior hip area.        History provided by:  Patient      Prior to Admission Medications   Prescriptions Last Dose Informant Patient Reported? Taking?   Eliquis 2.5 MG 3/12/2024 Self No Yes   Sig: TAKE 1 TABLET TWICE A DAY   acetaminophen (TYLENOL) 325 mg tablet  Self Yes No   Sig: Take 650 mg by mouth every 6 (six) hours as needed for mild pain   bisoprolol (ZEBETA) 5 mg tablet 3/12/2024 Self No Yes   Sig: Take 0.5 tablets (2.5 mg total) by mouth see administration instructions One half to one tablet daily as directed by physician   digoxin (LANOXIN) 0.125 mg tablet 3/11/2024  No Yes   Sig: Take 1 tablet (125 mcg total) by mouth daily   pravastatin (PRAVACHOL) 20 mg tablet 3/11/2024  No Yes   Sig: Take 1 tablet (20 mg total) by mouth daily at bedtime      Facility-Administered Medications: None       Past Medical History:   Diagnosis Date    A-fib (HCC)     Fracture of ankle     Hyperlipidemia     Hypertension     Macular degeneration     Osteoarthritis     Vitamin D deficiency        Past Surgical History:   Procedure Laterality Date    APPENDECTOMY      PERINEAL  PELVIC RECONSTRUCTION SURGERY         Family History   Problem Relation Age of Onset    Supraventricular tachycardia Family      I have reviewed and agree with  the history as documented.    E-Cigarette/Vaping    E-Cigarette Use Never User      E-Cigarette/Vaping Substances    Nicotine No     THC No     CBD No     Flavoring No     Unknown No      Social History     Tobacco Use    Smoking status: Never    Smokeless tobacco: Never   Vaping Use    Vaping status: Never Used   Substance Use Topics    Alcohol use: No     Comment: Social drinker per [employer]    Drug use: No       Review of Systems    Physical Exam  Physical Exam  Vitals and nursing note reviewed.   Constitutional:       General: She is not in acute distress.     Appearance: She is well-developed. She is not diaphoretic.      Comments: Elderly and frail c/w recorded age   HENT:      Head: Normocephalic and atraumatic.      Right Ear: External ear normal.      Left Ear: External ear normal.      Nose: Nose normal.      Mouth/Throat:      Mouth: Mucous membranes are moist.   Eyes:      Conjunctiva/sclera: Conjunctivae normal.      Pupils: Pupils are equal, round, and reactive to light.   Cardiovascular:      Rate and Rhythm: Normal rate and regular rhythm.   Pulmonary:      Effort: Pulmonary effort is normal.   Abdominal:      Palpations: Abdomen is soft.      Tenderness: There is no abdominal tenderness.   Musculoskeletal:      Cervical back: Normal range of motion and neck supple.      Left hip: Tenderness (seems to be posterior) present. Decreased range of motion.   Skin:     General: Skin is warm and dry.      Capillary Refill: Capillary refill takes less than 2 seconds.      Findings: No rash.   Neurological:      Mental Status: She is alert and oriented to person, place, and time.      Gait: Gait normal.   Psychiatric:         Behavior: Behavior normal.         Thought Content: Thought content normal.         Judgment: Judgment normal.         Vital Signs  ED Triage Vitals   Temp Pulse Respirations Blood Pressure SpO2   -- 03/12/24 1559 03/12/24 1559 03/12/24 1559 03/12/24 1559    79 16 (!) 224/103 97 %       Temp src Heart Rate Source Patient Position - Orthostatic VS BP Location FiO2 (%)   -- 03/12/24 1559 03/12/24 1559 03/12/24 1559 --    Monitor Lying Right arm       Pain Score       03/12/24 1629       10 - Worst Possible Pain           Vitals:    03/12/24 1559 03/12/24 1715   BP: (!) 224/103 (!) 173/81   Pulse: 79 73   Patient Position - Orthostatic VS: Lying Lying         Visual Acuity      ED Medications  Medications   morphine injection 2 mg (2 mg Intravenous Given 3/12/24 1629)   ondansetron (ZOFRAN) injection 4 mg (4 mg Intravenous Given 3/12/24 1627)   ketorolac (TORADOL) injection 15 mg (15 mg Intravenous Given 3/12/24 1743)       Diagnostic Studies  Results Reviewed       Procedure Component Value Units Date/Time    Protime-INR [[id number]]  (Normal) Collected: 03/12/24 1626    Lab Status: Final result Specimen: Blood from Arm, Left Updated: 03/12/24 1707     Protime 13.9 seconds      INR 1.05    APTT [[id number]]  (Normal) Collected: 03/12/24 1626    Lab Status: Final result Specimen: Blood from Arm, Left Updated: 03/12/24 1707     PTT 32 seconds     Basic metabolic panel [[id number]] Collected: 03/12/24 1626    Lab Status: Final result Specimen: Blood from Arm, Left Updated: 03/12/24 1656     Sodium 139 mmol/L      Potassium 4.3 mmol/L      Chloride 107 mmol/L      CO2 25 mmol/L      ANION GAP 7 mmol/L      BUN 18 mg/dL      Creatinine 0.68 mg/dL      Glucose 116 mg/dL      Calcium 9.0 mg/dL      eGFR 75 ml/min/1.73sq m     Narrative:      National [employer] guidelines for Chronic Kidney Disease (CKD):     Stage 1 with normal or high GFR (GFR > 90 mL/min/1.73 square meters)    Stage 2 Mild CKD (GFR = 60-89 mL/min/1.73 square meters)    Stage 3A Moderate CKD (GFR = 45-59 mL/min/1.73 square meters)    Stage 3B Moderate CKD (GFR = 30-44 mL/min/1.73 square meters)    Stage 4 Severe CKD (GFR = 15-29 mL/min/1.73 square meters)    Stage 5 End Stage CKD (GFR <15 mL/min/1.73 square meters)  Note:  GFR calculation is accurate only with a steady state creatinine    CBC and differential [[id number]] Collected: 03/12/24 1626    Lab Status: No result Specimen: Blood from Arm, Left                    XR hip/pelv 2-3 vws left    (Results Pending)   CT head without contrast    (Results Pending)   CT pelvis wo contrast    (Results Pending)              Procedures  ECG 12 Lead Documentation Only    Date/Time: 3/12/2024 4:59 PM    Performed by: Daryn Eng MD  Authorized by: Daryn Eng MD    Rate:     ECG rate:  65  Rhythm:     Rhythm: atrial fibrillation    Ectopy:     Ectopy: none             ED Course  ED Course as of 03/12/24 1801   Tue Mar 12, 2024   1723 XR hip/pelv 2-3 vws left  Reviewed results with patient at bedside and updated on the plan.  I do not see a fracture, but she continues to have severe pain with flexion extension of the left hip, so I am inclined to evaluate by CT   1725 Initial BP improving   1800 Care is being handed over to Dr. Ramos pending read of CT, I have already discussed the need to be admitted because she is having pain and cannot walk, and lives alone .                                SBIRT 20yo+      Flowsheet Row Most Recent Value   Initial Alcohol Screen: US AUDIT-C     1. How often do you have a drink containing alcohol? 0 Filed at: 03/12/2024 1633   2. How many drinks containing alcohol do you have on a typical day you are drinking?  0 Filed at: 03/12/2024 1633   3b. FEMALE Any Age, or MALE 65+: How often do you have 4 or more drinks on one occassion? 0 Filed at: 03/12/2024 1633   Audit-C Score 0 Filed at: 03/12/2024 1633   DARCIE: How many times in the past year have you...    Used an illegal drug or used a prescription medication for non-medical reasons? Never Filed at: 03/12/2024 1633                      Medical Decision Making  Amount and/or Complexity of Data Reviewed  Labs: ordered.  Radiology: ordered. Decision-making details documented in ED  Course.    Risk  Prescription drug management.             Disposition  Final diagnoses:   Fall, initial encounter   Left hip pain   Ambulatory dysfunction     Time reflects when diagnosis was documented in both MDM as applicable and the Disposition within this note       Time User Action Codes Description Comment    3/12/2024  5:36 PM Daryn Eng [W19.XXXA] Fall, initial encounter     3/12/2024  5:36 PM Daryn Eng [M25.552] Left hip pain     3/12/2024  5:36 PM Daryn Eng [R26.2] Ambulatory dysfunction           ED Disposition       None          Follow-up Information    None         Patient's Medications   Discharge Prescriptions    No medications on file       No discharge procedures on file.    PDMP Review       None            ED Provider  Electronically Signed by             Daryn Eng MD  03/12/24 2406

## 2024-03-13 ENCOUNTER — APPOINTMENT (OUTPATIENT)
Dept: RADIOLOGY | Facility: HOSPITAL | Age: 89
DRG: 605 | End: 2024-03-13
Payer: MEDICARE

## 2024-03-13 PROBLEM — Z86.73 HISTORY OF CVA (CEREBROVASCULAR ACCIDENT): Status: ACTIVE | Noted: 2024-03-13

## 2024-03-13 PROBLEM — D32.9 MENINGIOMA (HCC): Status: ACTIVE | Noted: 2024-03-13

## 2024-03-13 PROCEDURE — 99222 1ST HOSP IP/OBS MODERATE 55: CPT | Performed by: STUDENT IN AN ORGANIZED HEALTH CARE EDUCATION/TRAINING PROGRAM

## 2024-03-13 PROCEDURE — 97163 PT EVAL HIGH COMPLEX 45 MIN: CPT

## 2024-03-13 PROCEDURE — 97166 OT EVAL MOD COMPLEX 45 MIN: CPT

## 2024-03-13 PROCEDURE — 99223 1ST HOSP IP/OBS HIGH 75: CPT | Performed by: INTERNAL MEDICINE

## 2024-03-13 PROCEDURE — 73552 X-RAY EXAM OF FEMUR 2/>: CPT

## 2024-03-13 RX ADMIN — ACETAMINOPHEN 325MG 975 MG: 325 TABLET ORAL at 22:26

## 2024-03-13 RX ADMIN — APIXABAN 2.5 MG: 2.5 TABLET, FILM COATED ORAL at 09:01

## 2024-03-13 RX ADMIN — DIGOXIN 125 MCG: 125 TABLET ORAL at 22:26

## 2024-03-13 RX ADMIN — APIXABAN 2.5 MG: 2.5 TABLET, FILM COATED ORAL at 17:33

## 2024-03-13 RX ADMIN — ACETAMINOPHEN 325MG 975 MG: 325 TABLET ORAL at 14:27

## 2024-03-13 RX ADMIN — ACETAMINOPHEN 325MG 975 MG: 325 TABLET ORAL at 05:53

## 2024-03-13 RX ADMIN — PRAVASTATIN SODIUM 20 MG: 20 TABLET ORAL at 22:26

## 2024-03-13 RX ADMIN — BISOPROLOL FUMARATE 2.5 MG: 5 TABLET, FILM COATED ORAL at 09:01

## 2024-03-13 NOTE — H&P
[facility]  H&P  Name: Mable Gallagher 89 y.o. female I MRN: [id number]  Unit/Bed#: E5 -01 I Date of Admission: 3/12/2024   Date of Service: 3/13/2024 I Hospital Day: 0      Assessment/Plan   Meningioma (HCC)  Assessment & Plan  Suspected by ct head but stable compared to prior imaging.  Incidental finding/asymptomatic  Op surveillance    History of CVA (cerebrovascular accident)  Assessment & Plan  Incidental finding on ct head.  Continue eliquis/ statin    Ambulatory dysfunction  Assessment & Plan  2* fall w/left hip pain, appears mechanical. Uses walker at baseline.    Fall  Assessment & Plan  Leaned too hard on her right side of her walker and lost balance.  Actually hurt left leg however falling in house.  No loc/presyncope.    -ct head negative for acute fracture/hemorrhage/ischemia, ct pelvis negative as well for acute pathology  -consult pt/ot/cm  Supportive care    Hip pain  Assessment & Plan  Ct pelvis negative for fracture  Supportive care w/analgesics, pt/ot    Leiomyoma of body of uterus  Assessment & Plan  Myomatous uterus of unclear significance in post menopausal patient but again noted incidentally on ct pelvis stable from 7/23    Chronic diastolic (congestive) heart failure (HCC)  Assessment & Plan  Wt Readings from Last 3 Encounters:   03/12/24 57 kg (125 lb 10.6 oz)   02/20/24 56.4 kg (124 lb 6.4 oz)   10/31/23 58.2 kg (128 lb 4.8 oz)     Maintained presently off lasix  Continue bisoprolol daily        Permanent atrial fibrillation (HCC)  Assessment & Plan  Rate controlled w/bisoprolol and digoxin will continue  Continue low dose eliquis for stroke prophylaxis    Primary hypertension  Assessment & Plan  Continue bisoprolol         VTE Pharmacologic Prophylaxis:   High Risk (Score >/= 5) - Pharmacological DVT Prophylaxis Ordered: apixaban (Eliquis). Sequential Compression Devices Ordered.  Code Status: level 3 dni/dnr-pt  Discussion with family: Updated contact  person (daughter) at bedside.    Anticipated Length of Stay: Patient will be admitted on an observation basis with an anticipated length of stay of less than 2 midnights secondary to ambulatory dysfunction.    Total Time Spent on Date of Encounter in care of patient:  mins. This time was spent on one or more of the following: performing physical exam; counseling and coordination of care; obtaining or reviewing history; documenting in the medical record; reviewing/ordering tests, medications or procedures; communicating with other healthcare professionals and discussing with patient's family/caregivers.    Chief Complaint: fall left leg pain    History of Present Illness:  Mable Gallagher is a 89 y.o. female with a PMH of ambulatory dysfunction requiring walker at baseline afib chronic diastolic chf, htn who presents with fall.  Pt was in her normal state of health when she went to stand up about 5 minutes after her caregiver left.  She denies lightheadedness and knew she had to 'stand up into my walker' before moving but put more weight on her right side than she expected and her walker went out from under her and she fell.  No loc no presyncope upon standing.  Had left lateral leg pain from proximal thigh to just above knee thereafter and inability to bear weight.  In ed she was evaluated by ct head.  Xray was negative for fx of femur however had persistent pain.  Ct pelvis was obtained confirming no fx but given ongoing pain and inability to bear weight admission was requested..    Review of Systems:  Review of Systems   Eyes:  Positive for visual disturbance (nearly blind 2* macular degeneration).   Musculoskeletal:  Positive for gait problem.   Neurological:  Negative for light-headedness and headaches.   All other systems reviewed and are negative.      Past Medical and Surgical History:   Past Medical History:   Diagnosis Date    A-fib (HCC)     Fracture of ankle     Hyperlipidemia     Hypertension      Macular degeneration     Osteoarthritis     Vitamin D deficiency        Past Surgical History:   Procedure Laterality Date    APPENDECTOMY      PERINEAL  PELVIC RECONSTRUCTION SURGERY         Meds/Allergies:  Prior to Admission medications    Medication Sig Start Date End Date Taking? Authorizing Provider   bisoprolol (ZEBETA) 5 mg tablet Take 0.5 tablets (2.5 mg total) by mouth see administration instructions One half to one tablet daily as directed by physician 9/20/23  Yes Brandin Walter DO   digoxin (LANOXIN) 0.125 mg tablet Take 1 tablet (125 mcg total) by mouth daily 11/18/23  Yes Catie Boyle MD   Eliquis 2.5 MG TAKE 1 TABLET TWICE A DAY 4/17/23  Yes Brandin Walter DO   pravastatin (PRAVACHOL) 20 mg tablet Take 1 tablet (20 mg total) by mouth daily at bedtime 11/22/23  Yes Brandin Walter DO   acetaminophen (TYLENOL) 325 mg tablet Take 650 mg by mouth every 6 (six) hours as needed for mild pain    Historical Provider, MD     I have reviewed home medications with patient personally.    Allergies: No Known Allergies    Social History:  Marital Status:    Occupation:   Patient Pre-hospital Living Situation:   Patient Pre-hospital Level of Mobility:   Patient Pre-hospital Diet Restrictions:   Substance Use History:   Social History     Substance and Sexual Activity   Alcohol Use No    Comment: Social drinker per [employer]     Social History     Tobacco Use   Smoking Status Never   Smokeless Tobacco Never     Social History     Substance and Sexual Activity   Drug Use No       Family History:  Family History   Problem Relation Age of Onset    Supraventricular tachycardia Family        Physical Exam:     Vitals:   Blood Pressure: 112/57 (03/12/24 2327)  Pulse: 60 (03/12/24 2327)  Temperature: 98.1 °F (36.7 °C) (03/12/24 2327)  Temp Source: Oral (03/12/24 2015)  Respirations: 18 (03/12/24 2327)  Weight - Scale: 57 kg (125 lb 10.6 oz) (03/12/24 1559)  SpO2: 93 % (03/12/24  2327)    Physical Exam  Vitals reviewed.   HENT:      Head: Normocephalic and atraumatic.      Right Ear: External ear normal.      Left Ear: External ear normal.   Eyes:      Extraocular Movements: Extraocular movements intact.   Cardiovascular:      Rate and Rhythm: Normal rate.      Heart sounds: No murmur heard.     No friction rub. No gallop.   Pulmonary:      Breath sounds: No wheezing, rhonchi or rales.   Abdominal:      General: There is no distension.      Palpations: There is no mass.      Tenderness: There is no abdominal tenderness. There is no guarding or rebound.      Hernia: No hernia is present.   Musculoskeletal:         General: Tenderness (TTP to left lateral thigh) present.      Comments: No TTP of C/T/L/S spine or step off.  No pain of posterior/lateral pelvis   Neurological:      Mental Status: She is alert. Mental status is at baseline.   Psychiatric:         Mood and Affect: Mood normal.          Additional Data:     Lab Results:  Results from last 7 days   Lab Units 03/12/24  1626   WBC Thousand/uL 5.94   HEMOGLOBIN g/dL 13.2   HEMATOCRIT % 41.1   PLATELETS Thousands/uL 189   NEUTROS PCT % 75   LYMPHS PCT % 15   MONOS PCT % 8   EOS PCT % 1     Results from last 7 days   Lab Units 03/12/24  1626   SODIUM mmol/L 139   POTASSIUM mmol/L 4.3   CHLORIDE mmol/L 107   CO2 mmol/L 25   BUN mg/dL 18   CREATININE mg/dL 0.68   ANION GAP mmol/L 7   CALCIUM mg/dL 9.0   GLUCOSE RANDOM mg/dL 116     Results from last 7 days   Lab Units 03/12/24  1626   INR  1.05                   Lines/Drains:  Invasive Devices       Peripheral Intravenous Line  Duration             Peripheral IV 03/12/24 Right;Ventral (anterior) Forearm <1 day                        Imaging: Reviewed radiology reports from this admission including: abdominal/pelvic CT and CT head  CT pelvis wo contrast   Final Result by Moises Mac MD (03/12 2004)      No acute osseous abnormality.      Workstation performed: [id number]         CT  head without contrast   Final Result by Jani Gould MD (03/12 8522)      No interval change. No acute intracranial hemorrhage or depressed calvarial fracture.                  Workstation performed: [id number]         XR hip/pelv 2-3 vws left    (Results Pending)       EKG and Other Studies Reviewed on Admission:   EKG: on my personal review pt is in rate controlled a fib    ** Please Note: This note has been constructed using a voice recognition system. **

## 2024-03-13 NOTE — PLAN OF CARE
Problem: OCCUPATIONAL THERAPY ADULT  Goal: Performs self-care activities at highest level of function for planned discharge setting.  See evaluation for individualized goals.  Description: Treatment Interventions: ADL retraining, Functional transfer training, UE strengthening/ROM, Endurance training, Patient/family training, Equipment evaluation/education, Compensatory technique education, Continued evaluation, Activityengagement          See flowsheet documentation for full assessment, interventions and recommendations.   Note: Limitation: Decreased ADL status, Decreased UE strength, Decreased endurance, Decreased Safe judgement during ADL, Decreased self-care trans, Decreased high-level ADLs  Prognosis: Good  Assessment: Pt is a 89 y.o. female seen for OT evaluation s/p adm to [facility] on 3/12/2024 s/p fall and admitted w/ Ambulatory dysfunction . XR L hip/pelvis: No acute osseous abnormality. CT pelvis: No acute osseous abnormality. Comorbidities affecting pt’s functional performance include a significant PMH of A-Fib, HLD, HTN, Macular degeneration, OA. Pt with active OT orders and activity orders for Up and OOB as tolerated. Pt lives alone in a two level house with 1 KIM and 1st floor setup. Pt has HHA Mon-Fri 8AM-2PM. At baseline, pt required assist w/ ADLs (I w/ toileting) and IADLs. Pt reports able to dress herself independently on weekends when HHA not present. Mod I for functional transfers/mobility w/ use of RW. (-) . (+) falls PTA. Upon evaluation, pt currently requires Supervision for UB ADLs, Mod A for LB ADLs, Mod A for toileting, Min A for bed mobility, Supervision for transfers, and Min A for functional mobility 2* the following deficits impacting occupational performance: decreased strength , decreased balance, decreased activity tolerance, limited functional reach, decreased safety awareness, and increased pain. These impairments, as well at pt’s personal factors of: KIM home  environment, limited home support, difficulty performing ADLs, difficulty performing transfers/mobility, limited insight into deficits, fall risk , functional decline , access to transportation , and advanced age limit pt’s ability to safely engage in all baseline areas of occupation. Pt to continue to benefit from continued acute OT services during hospital stay to address defined deficits and to maximize level of functional independence in the following Occupational Performance areas: grooming, bathing/shower, toilet hygiene, dressing, medication management, health maintenance, functional mobility, community mobility, clothing management, and social participation. The patient's raw score on the -PAC Daily Activity Inpatient Short Form is 17. A raw score of less than 19 suggests the patient may benefit from discharge to post-acute rehabilitation services. Please refer to the recommendation of the Occupational Therapist for safe discharge planning.  OT will continue to follow pt 3-5x/wk to address the following goals to  w/in 10-14 days:     Rehab Resource Intensity Level, OT: I (Maximum Resource Intensity)

## 2024-03-13 NOTE — PLAN OF CARE
Problem: PHYSICAL THERAPY ADULT  Goal: Performs mobility at highest level of function for planned discharge setting.  See evaluation for individualized goals.  Description: Treatment/Interventions: Functional transfer training, LE strengthening/ROM, Elevations, Therapeutic exercise, Patient/family training, Equipment eval/education, Bed mobility, Gait training, Compensatory technique education, Continued evaluation, Spoke to nursing, OT          See flowsheet documentation for full assessment, interventions and recommendations.  Note: Prognosis: Good  Problem List: Impaired balance, Decreased mobility, Pain  Assessment: Prudepamela TITUS Gallagher is a 89 y.o. female admitted to [facility] on 3/12/2024 for Ambulatory dysfunction. PT was consulted and pt was seen on 3/13/2024 for mobility assessment and d/c planning. Pt presents w high fall risk, acute pain. At baseline gets A for some ADLs and ambulates w RW. Pt is currently functioning at a S level for bed mobility and transfers, min A for ambulation w RW. Pt demonstrated ability to ambulate limited household distances. Reports pain at rest that improved w WB to LLE. Require occ cues throughout session for safety and technique. Self limited ambulation dt effort. Pt will benefit from continued skilled IP PT to address the above mentioned impairments  in order to maximize recovery and increase functional independence when completing mobility and ADLs. The patient's AM-PAC Basic Mobility Inpatient Short Form Raw Score is 18. A Raw score of greater than 16 suggests the patient may benefit from discharge to home. Given that pt is occ home alone, at risk for recurrent falls at current LOF and requires hands on guarding/assist to safely amb, would benefit from continued therapy services prior to dc to facilitate recovery and optimize mobility prior to going home.  Barriers to Discharge: Inaccessible home environment, Decreased caregiver support  Barriers to Discharge Comments: +home  alone, fall risk, limited amb  Rehab Resource Intensity Level, PT: I (Maximum Resource Intensity)    See flowsheet documentation for full assessment.

## 2024-03-13 NOTE — ASSESSMENT & PLAN NOTE
Wt Readings from Last 3 Encounters:   03/12/24 57 kg (125 lb 10.6 oz)   02/20/24 56.4 kg (124 lb 6.4 oz)   10/31/23 58.2 kg (128 lb 4.8 oz)     Maintained presently off lasix  Continue bisoprolol daily

## 2024-03-13 NOTE — ASSESSMENT & PLAN NOTE
Myomatous uterus of unclear significance in post menopausal patient but again noted incidentally on ct pelvis stable from 7/23  D/w pt and daughter they aren't interested in further evaluation and workup given advanced age

## 2024-03-13 NOTE — ASSESSMENT & PLAN NOTE
Myomatous uterus of unclear significance in post menopausal patient but again noted incidentally on ct pelvis stable from 7/23

## 2024-03-13 NOTE — PHYSICAL THERAPY NOTE
PHYSICAL THERAPY EVALUATION          Patient Name: Mable Gallagher  Today's Date: 3/13/2024  PT EVALUATION    89 y.o.    [id number]    Hip pain [M25.559]  Left hip pain [M25.552]  Fall, initial encounter [W19.XXXA]  Ambulatory dysfunction [R26.2]    Past Medical History:   Diagnosis Date    A-fib (HCC)     Fracture of ankle     Hyperlipidemia     Hypertension     Macular degeneration     Osteoarthritis     Vitamin D deficiency      Past Surgical History:   Procedure Laterality Date    APPENDECTOMY      PERINEAL  PELVIC RECONSTRUCTION SURGERY          03/13/24 0926   PT Last Visit   PT Visit Date 03/13/24   Note Type   Note type Evaluation   Pain Assessment   Pain Assessment Tool 0-10   Pain Score 10 - Worst Possible Pain   Pain Location/Orientation Orientation: Right;Location: Hip;Location: Leg   Effect of Pain on Daily Activities reports improvement in pain sx w WB   Hospital Pain Intervention(s) Repositioned;Emotional support;Rest   Restrictions/Precautions   Weight Bearing Precautions Per Order No  (at time of evaluation)   Other Precautions Chair Alarm;Bed Alarm;Fall Risk;Pain   Home Living   Type of Home House   Home Layout Two level;Able to live on main level with bedroom/bathroom;Stairs to enter with rails   Bathroom Toilet Raised   Bathroom Equipment Grab bars in shower;Shower chair;Grab bars around toilet   Home Equipment Walker   Additional Comments first fl set up w 1 KIM   Prior Function   Level of [place] Independent with ADLs;Independent with functional mobility;Needs assistance with ADLs;Needs assistance with IADLS   Lives With Alone   Receives Help From Personal care attendant;Family  (reports aide M-F from 8a-2p. family will occ stop by on weekends prn)   IADLs Family/Friend/Other provides transportation;Family/Friend/Other provides meals;Independent with medication management  (meals from family, aide and some delivered.)   Falls in the last 6 months 1 to 4    Vocational Retired   Comments indep for toileting. reports aide helps her w showers and dressing however on the weekend she is able to indep dress herself. microwaves meals brought to her. amb w RW   General   Additional Pertinent History pt admitted 3/12/24 for ambulatory dysfunction. acute imaging ordered negative for fx. up and oob orders. PMHx significant for afib, CHF, CVA, meningioma, macular degeneration, arthritis   Cognition   Overall Cognitive Status WFL   Arousal/Participation Cooperative   Orientation Level Oriented X4   Memory Within functional limits   Following Commands Follows one step commands without difficulty   LLE Assessment   LLE Assessment WFL  (3-, some pain limitations)   Bed Mobility   Supine to Sit 5  Supervision   Additional items HOB elevated;Bedrails;Increased time required   Transfers   Sit to Stand 5  Supervision   Additional items Increased time required;Other;Verbal cues  (RW)   Stand to Sit 5  Supervision   Additional items Armrests;Increased time required;Verbal cues;Other  (RW)   Additional Comments cues for hand placement   Ambulation/Elevation   Gait pattern Improper Weight shift;Short stride;Excessively slow   Gait Assistance 4  Minimal assist   Additional items Assist x 1;Verbal cues  (cues for sequencing w RW)   Assistive Device Rolling walker   Distance 20'   Balance   Static Sitting Fair -   Static Standing Fair -   Dynamic Standing Poor +   Ambulatory Poor +   Endurance Deficit   Endurance Deficit No   Activity Tolerance   Activity Tolerance Patient tolerated treatment well;Other (Comment)  (effort)   Medical Staff Made Aware Allie OLIVER   Nurse Made Aware Ollie ECKERT; cleared for therapy   Assessment   Prognosis Good   Problem List Impaired balance;Decreased mobility;Pain   Assessment Prudence TITUS Gallagher is a 89 y.o. female admitted to [facility] on 3/12/2024 for Ambulatory dysfunction. PT was consulted and pt was seen on 3/13/2024 for mobility assessment and d/c planning. Pt  presents w high fall risk, acute pain. At baseline gets A for some ADLs and ambulates w RW. Pt is currently functioning at a S level for bed mobility and transfers, min A for ambulation w RW. Pt demonstrated ability to ambulate limited household distances. Reports pain at rest that improved w WB to LLE. Require occ cues throughout session for safety and technique. Self limited ambulation dt effort. Pt will benefit from continued skilled IP PT to address the above mentioned impairments  in order to maximize recovery and increase functional independence when completing mobility and ADLs. The patient's AM-PAC Basic Mobility Inpatient Short Form Raw Score is 18. A Raw score of greater than 16 suggests the patient may benefit from discharge to home. Given that pt is occ home alone, at risk for recurrent falls at current LOF and requires hands on guarding/assist to safely amb, would benefit from continued therapy services prior to dc to facilitate recovery and optimize mobility prior to going home.   Barriers to Discharge Inaccessible home environment;Decreased caregiver support   Barriers to Discharge Comments +home alone, fall risk, limited amb   Goals   Patient Goals reduce pain   STG Expiration Date 03/27/24   Short Term Goal #1 1).  Pt will perform bed mobility with Isabell demonstrating appropriate technique 100% of the time in order to improve function.2)  Perform all transfers with Isabell demonstrating safe and appropriate technique 100% of the time in order to improve ability to negotiate safely in home environment.3) Amb with least restrictive AD > 100'x1 with mod I in order to demonstrate ability to negotiate in home environment.4)  Improve overall strength and balance 1/2 grade in order to optimize ability to perform functional tasks and reduce fall risk.5) Increase activity tolerance to 45 minutes in order to improve endurance to functional tasks.6)  Negotiate stairs using most appropriate technique and S-CGA in  order to be able to negotiate safely in home environment. 7) PT for ongoing patient and family/caregiver education, DME needs and d/c planning in order to promote highest level of function in least restrictive environment.   Plan   Treatment/Interventions Functional transfer training;LE strengthening/ROM;Elevations;Therapeutic exercise;Patient/family training;Equipment eval/education;Bed mobility;Gait training;Compensatory technique education;Continued evaluation;Spoke to nursing;OT   PT Frequency 4-6x/wk   Discharge Recommendation   Rehab Resource Intensity Level, PT I (Maximum Resource Intensity)   AM-PAC Basic Mobility Inpatient   Turning in Flat Bed Without Bedrails 3   Lying on Back to Sitting on Edge of Flat Bed Without Bedrails 3   Moving Bed to Chair 3   Standing Up From Chair Using Arms 3   Walk in Room 3   Climb 3-5 Stairs With Railing 3   Basic Mobility Inpatient Raw Score 18   Basic Mobility Standardized Score 41.05   Highest Level Of Mobility   JH-HLM Goal 6: Walk 10 steps or more   JH-HLM Achieved 6: Walk 10 steps or more   End of Consult   Patient Position at End of Consult Bedside chair;Bed/Chair alarm activated;All needs within reach   History: co - morbidities including age, social background, use of assistive device, assist for adl's/iadl's, ?cognition, current experience including fall risk  Exam: impairments in systems including multiple body structures involved; musculoskeletal (strength), neuromuscular (balance, gait, transfers), joint integrity (acute imaging negative for trauma), am-pac, cognition; activity limitations  (difficulties executing an action); participation restrictions (problems associated w involvement in life situations)  Clinical: unstable/unpredictable  Complexity:high      Vida Craig, PT

## 2024-03-13 NOTE — ASSESSMENT & PLAN NOTE
Leaned too hard on her right side of her walker and lost balance.  Actually hurt left leg however falling in house.  No loc/presyncope.    -ct head negative for acute fracture/hemorrhage/ischemia, ct pelvis negative as well for acute pathology  -consult pt/ot/cm  Supportive care

## 2024-03-13 NOTE — PLAN OF CARE

## 2024-03-13 NOTE — OCCUPATIONAL THERAPY NOTE
Occupational Therapy Evaluation     Patient Name: Mable Gallagher  Today's Date: 3/13/2024  Problem List  Principal Problem:    Ambulatory dysfunction  Active Problems:    Primary hypertension    Permanent atrial fibrillation (HCC)    Chronic diastolic (congestive) heart failure (HCC)    Leiomyoma of body of uterus    Hip pain    Fall    History of CVA (cerebrovascular accident)    Meningioma (HCC)    Past Medical History  Past Medical History:   Diagnosis Date    A-fib (HCC)     Fracture of ankle     Hyperlipidemia     Hypertension     Macular degeneration     Osteoarthritis     Vitamin D deficiency      Past Surgical History  Past Surgical History:   Procedure Laterality Date    APPENDECTOMY      PERINEAL  PELVIC RECONSTRUCTION SURGERY             03/13/24 0927   OT Last Visit   OT Visit Date 03/13/24   Note Type   Note type Evaluation   Pain Assessment   Pain Assessment Tool 0-10   Pain Score 10 - Worst Possible Pain   Pain Location/Orientation Location: Leg;Location: Hip;Orientation: Left   Hospital Pain Intervention(s) Repositioned;Ambulation/increased activity;Emotional support;Rest   Multiple Pain Sites No   Restrictions/Precautions   Weight Bearing Precautions Per Order No  (at time of evaluation)   Other Precautions Chair Alarm;Bed Alarm;Fall Risk;Pain   Home Living   Type of Home House   Home Layout Two level;Performs ADLs on one level;Able to live on main level with bedroom/bathroom;Stairs to enter with rails   Bathroom Shower/Tub Walk-in shower   Bathroom Toilet Raised   Bathroom Equipment Grab bars in shower;Shower chair;Grab bars around toilet   Bathroom Accessibility Accessible   Home Equipment Walker   Additional Comments Pt lives alone in a two level house with 1 KIM and 1st floor setup. Pt has HHA Mon-Fri 8AM-2PM.   Prior Function   Level of [place] Independent with functional mobility;Needs assistance with ADLs;Needs assistance with IADLS   Lives With Alone   Receives Help From  Personal care attendant  (reports aide M-F from 8a-2p. family will occ stop by on weekends prn)   IADLs Independent with medication management;Family/Friend/Other provides transportation;Family/Friend/Other provides meals  (pt recieves meals 3days/wk from [employer] and dtr provides frozen meals that pt reheats)   Falls in the last 6 months 1 to 4   Vocational Retired   Comments At baseline, pt required assist w/ ADLs (I w/ toileting) and IADLs. Pt reports able to dress herself independently on weekends when HHA not present. Mod I for functional transfers/mobility w/ use of RW. (-) . (+) falls PTA.   Lifestyle   Autonomy At baseline, pt required assist w/ ADLs (I w/ toileting) and IADLs. Pt reports able to dress herself independently on weekends when HHA not present. Mod I for functional transfers/mobility w/ use of RW. (-) . (+) falls PTA.   Reciprocal Relationships Supportive family   Service to Others Retired   ADL   Where Assessed Chair   Eating Assistance 7  Independent   Grooming Assistance 5  Supervision/Setup   UB Bathing Assistance 5  Supervision/Setup   LB Bathing Assistance 3  Moderate Assistance   UB Dressing Assistance 5  Supervision/Setup   LB Dressing Assistance 3  Moderate Assistance   Toileting Assistance  4  Minimal Assistance   Functional Assistance 4  Minimal Assistance   Bed Mobility   Supine to Sit 5  Supervision   Additional items HOB elevated;Bedrails;Increased time required   Sit to Supine Unable to assess   Additional Comments Verbal cues for increased safety awareness- (ie: Keep RW close to body during transitions all the way to chair surface). Pt seated OOB in chair with chair alarm activated at end of session. Call bell and phone within reach. All needs met and pt reports no further questions for OT at this time.   Transfers   Sit to Stand 5  Supervision   Additional items Increased time required;Verbal cues   Stand to Sit 5  Supervision   Additional items  Armrests;Increased time required;Verbal cues   Additional Comments Cues for safe technique and hand placement   Functional Mobility   Functional Mobility 4  Minimal assistance   Additional Comments Assist x1   Additional items Rolling walker   Balance   Static Sitting Fair   Dynamic Sitting Fair -   Static Standing Fair -   Dynamic Standing Poor +   Ambulatory Poor +   Activity Tolerance   Activity Tolerance Patient tolerated treatment well;Patient limited by pain   Medical Staff Made Aware BESSY Mcpherson   Nurse Made Aware yes; TOMEKA Levin   RUE Assessment   RUE Assessment WFL  (4-/5 throughout)   LUE Assessment   LUE Assessment WFL  (4-/5 throughout)   Hand Function   Gross Motor Coordination Functional   Fine Motor Coordination Functional   Sensation   Light Touch No apparent deficits   Proprioception   Proprioception No apparent deficits   Vision-Basic Assessment   Visual History Macular degeneration   Vision - Complex Assessment   Additional Comments Decreased acuity B/L eyes 2* MD. Pt able to accurately identify 3/3 digits held up by therapist   Psychosocial   Psychosocial (WDL) WDL   Perception   Inattention/Neglect Appears intact   Cognition   Overall Cognitive Status WFL   Arousal/Participation Alert;Cooperative   Attention Within functional limits   Orientation Level Oriented X4   Memory Within functional limits   Following Commands Follows one step commands without difficulty   Assessment   Limitation Decreased ADL status;Decreased UE strength;Decreased endurance;Decreased Safe judgement during ADL;Decreased self-care trans;Decreased high-level ADLs   Prognosis Good   Assessment Pt is a 89 y.o. female seen for OT evaluation s/p adm to [facility] on 3/12/2024 s/p fall and admitted w/ Ambulatory dysfunction . XR L hip/pelvis: No acute osseous abnormality. CT pelvis: No acute osseous abnormality. Comorbidities affecting pt’s functional performance include a significant PMH of A-Fib, HLD, HTN, Macular  degeneration, OA. Pt with active OT orders and activity orders for Up and OOB as tolerated. Pt lives alone in a two level house with 1 KIM and 1st floor setup. Pt has HHA Mon-Fri 8AM-2PM. At baseline, pt required assist w/ ADLs (I w/ toileting) and IADLs. Pt reports able to dress herself independently on weekends when HHA not present. Mod I for functional transfers/mobility w/ use of RW. (-) . (+) falls PTA. Upon evaluation, pt currently requires Supervision for UB ADLs, Mod A for LB ADLs, Mod A for toileting, Min A for bed mobility, Supervision for transfers, and Min A for functional mobility 2* the following deficits impacting occupational performance: decreased strength , decreased balance, decreased activity tolerance, limited functional reach, decreased safety awareness, and increased pain. These impairments, as well at pt’s personal factors of: KIM home environment, limited home support, difficulty performing ADLs, difficulty performing transfers/mobility, limited insight into deficits, fall risk , functional decline , access to transportation , and advanced age limit pt’s ability to safely engage in all baseline areas of occupation. Pt to continue to benefit from continued acute OT services during hospital stay to address defined deficits and to maximize level of functional independence in the following Occupational Performance areas: grooming, bathing/shower, toilet hygiene, dressing, medication management, health maintenance, functional mobility, community mobility, clothing management, and social participation. The patient's raw score on the -PAC Daily Activity Inpatient Short Form is 17. A raw score of less than 19 suggests the patient may benefit from discharge to post-acute rehabilitation services. Please refer to the recommendation of the Occupational Therapist for safe discharge planning.  OT will continue to follow pt 3-5x/wk to address the following goals to  w/in 10-14 days:   Goals    Patient Goals To have less pain   LTG Time Frame 10-14   Long Term Goal Please refer to LTGs listed below   Plan   Treatment Interventions ADL retraining;Functional transfer training;UE strengthening/ROM;Endurance training;Patient/family training;Equipment evaluation/education;Compensatory technique education;Continued evaluation;Activityengagement   Goal Expiration Date 03/27/24   OT Treatment Day 0   OT Frequency 3-5x/wk   Discharge Recommendation   Rehab Resource Intensity Level, OT I (Maximum Resource Intensity)   AM-PAC Daily Activity Inpatient   Lower Body Dressing 2   Bathing 2   Toileting 3   Upper Body Dressing 3   Grooming 3   Eating 4   Daily Activity Raw Score 17   Daily Activity Standardized Score (Calc for Raw Score >=11) 37.26   AM-PAC Applied Cognition Inpatient   Following a Speech/Presentation 4   Understanding Ordinary Conversation 4   Taking Medications 3   Remembering Where Things Are Placed or Put Away 4   Remembering List of 4-5 Errands 3   Taking Care of Complicated Tasks 3   Applied Cognition Raw Score 21   Applied Cognition Standardized Score 44.3        GOALS    Pt will improve activity tolerance to G for min 30 min txment sessions for increase engagement in functional tasks    Pt will complete bed mobility at a Mod I level w/ G balance/safety demonstrated to decrease caregiver assistance required     Pt will complete UB dressing/self care w/ mod I using adaptive device and DME as needed     Pt will complete LB dressing/self care w/ mod I using adaptive device and DME as needed    Pt will complete toileting w/ mod I w/ G hygiene/thoroughness using DME as needed    Pt will improve functional transfers to Mod I on/off all surfaces using DME as needed w/ G balance/safety     Pt will improve functional mobility during ADL/IADL/leisure tasks to Mod I using DME as needed w/ G balance/safety     Pt will be attentive 100% of the time during ongoing cognitive assessment w/ G participation to  assist w/ safe d/c planning/recommendations    Pt will demonstrate G carryover of pt/caregiver education and training as appropriate w/o cues w/ good tolerance to increase safety during functional tasks    Pt will increase BUE strength by 1MM grade via AROM exercises to increase independence in ADLs and transfers    Pt will verbalize 3 potential fall hazards and identify appropriate compensatory techniques to decrease fall risk in home environment     Pt will increase standing tolerance to 3-5 mins with Fair+ dynamic standing balance to increase safety during participation in ADLs       Allie Bowen OTR/L

## 2024-03-13 NOTE — PLAN OF CARE

## 2024-03-13 NOTE — CASE MANAGEMENT
Case Management Assessment & Discharge Planning Note    Patient name Mable Gallagher  [facility] 5 /E5 -* MRN [id number]  : 1930 Date 3/13/2024       Current Admission Date: 3/12/2024  Current Admission Diagnosis:Ambulatory dysfunction   Patient Active Problem List    Diagnosis Date Noted    History of CVA (cerebrovascular accident) 2024    Meningioma (HCC) 2024    Leiomyoma of body of uterus 2024    Hip pain 2024    Fall 2024    Ambulatory dysfunction 2024    Chronic rhinitis 2023    Macular degeneration     Second hand smoke exposure 2022    Post-viral cough syndrome 2022    Chronic diastolic (congestive) heart failure (HCC) 2021    Lower extremity edema 2021    Numbness of hand 2020    Ulnar nerve entrapment at elbow 2017    Vitamin D deficiency 2014    Premature ventricular contraction 2013    Hyperlipidemia 10/16/2012    Primary hypertension 10/16/2012    Permanent atrial fibrillation (HCC) 10/16/2012    Esophageal reflux 10/16/2012      LOS (days): 0  Geometric Mean LOS (GMLOS) (days):   Days to GMLOS:     OBJECTIVE:              Current admission status: Observation  Referral Reason: Acute Rehab    Preferred Pharmacy:   [facility] [address] [address]  Phone: [phone] Fax: [phone]    [id number] [place], [place]  [address] [address]  Phone: [phone] Fax: [phone]    Primary Care Provider: Catie Boyle MD    Primary Insurance: MEDICARE  Secondary Insurance: COMMERCIAL MISCELLANEOUS    ASSESSMENT:  Active Health Care Proxies    There are no active Health Care Proxies on file.       Advance Directives  Does patient have a Health Care POA?: Yes  Does patient have Advance Directives?: Yes  Advance Directives: Living will, Power of  for  health care  Primary Contact: Ramu Gallagher (Child)  [phone] (Mobile)    Obs Notice Signed: 03/12/24    Readmission Root Cause  30 Day Readmission: No    Patient Information  Admitted from:: Home  Mental Status: Alert  During Assessment patient was accompanied by: Daughter  Assessment information provided by:: Patient, Daughter  Primary Caregiver: Private caregiver  Caregiver's Name:: Davy: [phone]: [facility]  Caregiver's Telephone Number:: Davy: [phone]: [facility]  Support Systems: Family members, Private Caregivers  County of Residence: [place]  What city do you live in[place]  Home entry access options. Select all that apply.: Stairs  Number of steps to enter home.: 3  Do the steps have railings?: No  Type of Current Residence: [address] [place] [address] home  Upon entering residence, is there a bedroom on the main floor (no further steps)?: Yes  Upon entering residence, is there a bathroom on the main floor (no further steps)?: Yes  Living Arrangements: Lives Alone  Is patient a ?: No    Activities of Daily Living Prior to Admission  Functional Status: Assistance  Completes ADLs independently?: No  Level of ADL dependence: Assistance  Ambulates independently?: No  Level of ambulatory dependence: Assistance  Does patient use assisted devices?: Yes  Assisted Devices (DME) used: Walker, Rollator  Does patient currently own DME?: Yes  What DME does the patient currently own?: Walker, Rollator  Does patient have a history of Outpatient Therapy (PT/OT)?: No  Does the patient have a history of Short-Term Rehab?: No  Does patient have a history of HHC?: No  Does patient currently have HHC?: No    Current Home Health Care  Type of Current Home Care Services: Home health aide, Meals on Wheels    Patient Information Continued  Income Source: Pension/senior care  Does patient have prescription coverage?: Yes  Does patient receive dialysis treatments?: No  Does patient have a history of substance  abuse?: No  Does patient have a history of Mental Health Diagnosis?: No    PHQ 2/9 Screening   Reviewed PHQ 2/9 Depression Screening Score?: No    Means of Transportation  Means of Transport to Appts:: Family transport      Social Determinants of Health (SDOH)      Flowsheet Row Most Recent Value   Housing Stability    In the last 12 months, was there a time when you were not able to pay the mortgage or rent on time? N   In the last 12 months, was there a time when you did not have a steady place to sleep or slept in a shelter (including now)? N   Transportation Needs    In the past 12 months, has lack of transportation kept you from medical appointments or from getting medications? no   In the past 12 months, has lack of transportation kept you from meetings, work, or from getting things needed for daily living? No   Food Insecurity    Within the past 12 months, you worried that your food would run out before you got the money to buy more. Never true   Within the past 12 months, the food you bought just didn't last and you didn't have money to get more. Never true   Utilities    In the past 12 months has the electric, gas, oil, or water company threatened to shut off services in your home? No            DISCHARGE DETAILS:    Discharge planning discussed with:: Patient and DTR  Freedom of Choice: Yes  Comments - Freedom of Choice: Pending [facility]  CM contacted family/caregiver?: Yes  Were Treatment Team discharge recommendations reviewed with patient/caregiver?: Yes  Did patient/caregiver verbalize understanding of patient care needs?: N/A- going to facility  Were patient/caregiver advised of the risks associated with not following Treatment Team discharge recommendations?: Yes    Contacts  Patient Contacts: DTR: José Miguel [phone]  Relationship to Patient:: Family  Contact Method: In Person  Reason/Outcome: Discharge Planning, Referral    Requested Home Health Care         Is the patient interested in [facility]  at discharge?: No    DME Referral Provided  Referral made for DME?: Yes  DME referral completed for the following items:: Wheelchair  DME Supplier Name:: [employer]    Other Referral/Resources/Interventions Provided:  Interventions: Acute Rehab  Referral Comments: Referral sent to [facility] for [facility] and [place] fro WC    Would you like to participate in our [facility] service program?  : No - Declined    Treatment Team Recommendation: Acute Rehab      Additional Comments: CM spoke with patient and DTR Stefani at bedside. Patient has a caregiver 8-2pm with [facility] [phone]. Patient has a walker at home and a Rollator available to use. Family is requesting a WC. CM discussed PT recs for Level I [facility]. Patient and DTR states they want to discuss futher. CM provided numbers and address to [facility]. CM will continue to follow up on d/c planning and needs.  Update: Patient and DTR agreed on Acute Rehab and asked for CM to have WC sent to patient's Daughter home. Order placed.

## 2024-03-13 NOTE — ASSESSMENT & PLAN NOTE
Suspected by ct head but stable compared to prior imaging.  Incidental finding/asymptomatic  Op surveillance

## 2024-03-13 NOTE — CONSULTS
Orthopedics   Mable QURESHI Aileen 89 y.o. female MRN: [id number]  Unit/Bed#: E5 -01      Chief Complaint:   left hip pain    HPI:   89 y.o.female who ambulates at baseline with a walker had a fall yesterday onto her left hip after her caregiver left.  She had inability to bear weight on the left side so she was brought to the ER.  X-rays of the left hip as well as CT pelvis without contrast were performed and negative for acute fracture.  She was admitted to hospital medicine services for ambulatory dysfunction, and orthopedics was consulted.  Today, the patient states her pain is well localized to the posterior lateral mid, proximal thigh without radiation.  She states she has no current left lower back pain, or left knee pain.  No associated numbness and tingling.  She is not on any blood thinning medications.  No history of surgery to the left hip    Review Of Systems:   Skin: Normal  Neuro: See HPI  Musculoskeletal: See HPI  14 point review of systems negative except as stated above     Past Medical History:   Past Medical History:   Diagnosis Date    A-fib (HCC)     Fracture of ankle     Hyperlipidemia     Hypertension     Macular degeneration     Osteoarthritis     Vitamin D deficiency        Past Surgical History:   Past Surgical History:   Procedure Laterality Date    APPENDECTOMY      PERINEAL  PELVIC RECONSTRUCTION SURGERY         Family History:  Family history reviewed and non-contributory  Family History   Problem Relation Age of Onset    Supraventricular tachycardia Family        Social History:  Social History     Socioeconomic History    Marital status:      Spouse name: None    Number of children: None    Years of education: None    Highest education level: None   Occupational History    None   Tobacco Use    Smoking status: Never    Smokeless tobacco: Never   Vaping Use    Vaping status: Never Used   Substance and Sexual Activity    Alcohol use: No     Comment: Social drinker per  [employer]    Drug use: No    Sexual activity: Not Currently   Other Topics Concern    None   Social History Narrative    None     Social Determinants of Health     Financial Resource Strain: Low Risk  (10/31/2023)    Overall Financial Resource Strain (CARDIA)     Difficulty of Paying Living Expenses: Not hard at all   Food Insecurity: Not on file   Transportation Needs: No Transportation Needs (10/31/2023)    PRAPARE - Transportation     Lack of Transportation (Medical): No     Lack of Transportation (Non-Medical): No   Physical Activity: Not on file   Stress: Not on file   Social Connections: Not on file   Intimate Partner Violence: Not on file   Housing Stability: Not on file       Allergies:   No Known Allergies        Labs:  0   Lab Value Date/Time    HCT 41.1 03/12/2024 1626    HCT 44.6 07/10/2023 2056    HCT 38.9 12/15/2022 0041    HCT 44.0 01/08/2016 1029    HCT 39.8 05/13/2015 1034    HCT 40.0 03/10/2015 1051    HGB 13.2 03/12/2024 1626    HGB 13.9 07/10/2023 2056    HGB 12.0 12/15/2022 0041    HGB 14.0 01/08/2016 1029    HGB 12.3 05/13/2015 1034    HGB 12.2 03/10/2015 1051    INR 1.05 03/12/2024 1626    INR 2.45 (H) 01/08/2016 1029    WBC 5.94 03/12/2024 1626    WBC 7.82 07/10/2023 2056    WBC 7.29 12/15/2022 0041    WBC 10.30 (H) 01/08/2016 1029    WBC 8.00 05/13/2015 1034    WBC 9.49 03/10/2015 1051       Meds:    Current Facility-Administered Medications:     acetaminophen (TYLENOL) tablet 975 mg, 975 mg, Oral, Q8H ABI, Brigette Cano PA-C, 975 mg at 03/13/24 0553    apixaban (ELIQUIS) tablet 2.5 mg, 2.5 mg, Oral, BID, Brigette Cano PA-C, 2.5 mg at 03/13/24 0901    bisoprolol (ZEBETA) tablet 2.5 mg, 2.5 mg, Oral, Daily, Brigette Cano PA-C, 2.5 mg at 03/13/24 0901    digoxin (LANOXIN) tablet 125 mcg, 125 mcg, Oral, HS, Brigette Cano PA-C, 125 mcg at 03/12/24 2344    HYDROmorphone HCl (DILAUDID) injection 0.2 mg, 0.2 mg, Intravenous, Q6H PRN, Brigette Cano PA-C    ondansetron  "(ZOFRAN) injection 4 mg, 4 mg, Intravenous, Q6H PRN, Brigette Cano PA-C    oxyCODONE (ROXICODONE) split tablet 2.5 mg, 2.5 mg, Oral, Q6H PRN, Brigette Cano PA-C    pravastatin (PRAVACHOL) tablet 20 mg, 20 mg, Oral, HS, Brigette Cano PA-C, 20 mg at 03/12/24 2344    Blood Culture:   No results found for: \"BLOODCX\"    Wound Culture:   No results found for: \"WOUNDCULT\"    Ins and Outs:  No intake/output data recorded.          Physical Exam:   /54   Pulse 64   Temp 98.5 °F (36.9 °C)   Resp 16   Wt 57 kg (125 lb 10.6 oz)   SpO2 93%   BMI 22.26 kg/m²   Gen: No acute distress, resting comfortably in bed  HEENT: Eyes clear, moist mucus membranes, hearing intact  Respiratory: No audible wheezing or stridor  Cardiovascular: Well Perfused peripherally, 2+ distal pulse  Abdomen: nondistended, no peritoneal signs  Musculoskeletal: left lower extremity  There is no shortening or significant rotation of the lower extremity compared to the contralateral side.  Skin dry, and intact, no erythema or open wounds.  Painful range of motion of hip joint but no micromotion tenderness  No significant tenderness about the knee   + TTP Mid / proximal and lateral thigh  Sensation intact L3-S1  Negative logroll  Able to straight leg raise but weak 2/2 pain  No calf tenderness or pitting edema        Radiology:   I personally reviewed the films.    X-rays Left hip 3/12/2024  FINDINGS:  No acute fracture or dislocation.  Bilateral hip osteoarthritis.  No lytic or blastic osseous lesion.  Right pelvis hernioplasty.  Degenerative lower lumbar spine and pubic symphysis.     IMPRESSION:  No acute osseous abnormality.    CT Pelvis without contrast 3/12/2024  BONES: No acute fracture or suspicious osseous lesion. Degenerative changes in the visualized lower lumbar spine, and unchanged grade 1 anterolisthesis at L4-5.     IMPRESSION:  No acute osseous abnormality.   "   _*_*_*_*_*_*_*_*_*_*_*_*_*_*_*_*_*_*_*_*_*_*_*_*_*_*_*_*_*_*_*_*_*_*_*_*_*_*_*_*_*    Assessment:  89 y.o.female with left hip contusion/pain s/p fall.  X-rays of left hip and CT Pelvis negative for fracture.    Plan:   NWB left lower extremity for now  Obtain full length left femur x-rays for completeness.  Order placed.    Pain control  Ice to the area  Dispo: Ortho will follow-up on x-rays.  If no fracture identified, will likely transition to WBAT LLE and recommend rest, Ice, and PT/OT.  Discussed with attending physician.          Alex Griffith PA-C

## 2024-03-13 NOTE — PLAN OF CARE

## 2024-03-13 NOTE — ASSESSMENT & PLAN NOTE
Rate controlled w/bisoprolol and digoxin will continue  Continue low dose eliquis for stroke prophylaxis
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
## 2024-03-14 PROCEDURE — 99231 SBSQ HOSP IP/OBS SF/LOW 25: CPT

## 2024-03-14 PROCEDURE — 99233 SBSQ HOSP IP/OBS HIGH 50: CPT | Performed by: INTERNAL MEDICINE

## 2024-03-14 RX ORDER — CYCLOBENZAPRINE HCL 5 MG
5 TABLET ORAL
Status: DISCONTINUED | OUTPATIENT
Start: 2024-03-14 | End: 2024-03-15

## 2024-03-14 RX ORDER — MUSCLE RUB CREAM 100; 150 MG/G; MG/G
CREAM TOPICAL 3 TIMES DAILY
Status: DISCONTINUED | OUTPATIENT
Start: 2024-03-14 | End: 2024-03-16 | Stop reason: HOSPADM

## 2024-03-14 RX ORDER — LIDOCAINE 50 MG/G
1 PATCH TOPICAL DAILY
Status: DISCONTINUED | OUTPATIENT
Start: 2024-03-14 | End: 2024-03-16 | Stop reason: HOSPADM

## 2024-03-14 RX ADMIN — PRAVASTATIN SODIUM 20 MG: 20 TABLET ORAL at 21:20

## 2024-03-14 RX ADMIN — ACETAMINOPHEN 325MG 975 MG: 325 TABLET ORAL at 05:24

## 2024-03-14 RX ADMIN — APIXABAN 2.5 MG: 2.5 TABLET, FILM COATED ORAL at 17:18

## 2024-03-14 RX ADMIN — APIXABAN 2.5 MG: 2.5 TABLET, FILM COATED ORAL at 09:29

## 2024-03-14 RX ADMIN — BISOPROLOL FUMARATE 2.5 MG: 5 TABLET, FILM COATED ORAL at 09:29

## 2024-03-14 RX ADMIN — ACETAMINOPHEN 325MG 975 MG: 325 TABLET ORAL at 21:20

## 2024-03-14 RX ADMIN — DIGOXIN 125 MCG: 125 TABLET ORAL at 21:20

## 2024-03-14 RX ADMIN — LIDOCAINE 5% 1 PATCH: 700 PATCH TOPICAL at 17:18

## 2024-03-14 RX ADMIN — CYCLOBENZAPRINE HYDROCHLORIDE 5 MG: 5 TABLET, FILM COATED ORAL at 21:20

## 2024-03-14 NOTE — ASSESSMENT & PLAN NOTE
Mechanical fall without any evidence of osseous abnormality  Suspect related to musculoskeletal etiology including irritation of iliotibial band and quadricep muscle group  Supportive care including topical and oral medication  Scheduled Tylenol ATC  Low-dose muscle relaxer at bedtime  Lidocaine patch  Physical therapy and Occupational Therapy evaluations, possible rehab placement if no improvement

## 2024-03-14 NOTE — PLAN OF CARE
Problem: Potential for Falls  Goal: Patient will remain free of falls  Description: INTERVENTIONS:  - Educate patient/family on patient safety including physical limitations  - Instruct patient to call for assistance with activity   - Consult OT/PT to assist with strengthening/mobility   - Keep Call bell within reach  - Keep bed low and locked with side rails adjusted as appropriate  - Keep care items and personal belongings within reach  - Initiate and maintain comfort rounds  - Make Fall Risk Sign visible to staff  - Offer Toileting every  Hours, in advance of need  - Initiate/Maintain alarm  - Obtain necessary fall risk management equipment:   - Apply yellow socks and bracelet for high fall risk patients  - Consider moving patient to room near nurses station  Outcome: Progressing     Problem: PAIN - ADULT  Goal: Verbalizes/displays adequate comfort level or baseline comfort level  Description: Interventions:  - Encourage patient to monitor pain and request assistance  - Assess pain using appropriate pain scale  - Administer analgesics based on type and severity of pain and evaluate response  - Implement non-pharmacological measures as appropriate and evaluate response  - Consider cultural and social influences on pain and pain management  - Notify physician/advanced practitioner if interventions unsuccessful or patient reports new pain  Outcome: Progressing     Problem: INFECTION - ADULT  Goal: Absence or prevention of progression during hospitalization  Description: INTERVENTIONS:  - Assess and monitor for signs and symptoms of infection  - Monitor lab/diagnostic results  - Monitor all insertion sites, i.e. indwelling lines, tubes, and drains  - Monitor endotracheal if appropriate and nasal secretions for changes in amount and color  - Marlow appropriate cooling/warming therapies per order  - Administer medications as ordered  - Instruct and encourage patient and family to use good hand hygiene technique  -  Immunosuppresion Meds: Tacrolimus; Sirolimus  Patient Notified: 01/24/19  Patient Notified Time: 5070  Was Provider Consulted: Yes  Provider Consulted: Kanika Dill  Last Creatinine: 1.01  Tacrolimus Result Date: 01/23/19  Tacrolimus Test Result: 8.0  Current Dose: 2 mg BID  Current Range Goal: 6-9  Comments: Continue current dose  Sirolimus Result Date: 01/23/19  Sirolimus Test Result: 7.7  Current Dose: 2 mg Wed/Fri and 1 mg all other days  Current Range Goal: 6-12    Comments: Continue current dose Identify and instruct in appropriate isolation precautions for identified infection/condition  Outcome: Progressing  Goal: Absence of fever/infection during neutropenic period  Description: INTERVENTIONS:  - Monitor WBC    Outcome: Progressing     Problem: SAFETY ADULT  Goal: Patient will remain free of falls  Description: INTERVENTIONS:  - Educate patient/family on patient safety including physical limitations  - Instruct patient to call for assistance with activity   - Consult OT/PT to assist with strengthening/mobility   - Keep Call bell within reach  - Keep bed low and locked with side rails adjusted as appropriate  - Keep care items and personal belongings within reach  - Initiate and maintain comfort rounds  - Make Fall Risk Sign visible to staff  - Offer Toileting every  Hours, in advance of need  - Initiate/Maintain alarm  - Obtain necessary fall risk management equipment:   - Apply yellow socks and bracelet for high fall risk patients  - Consider moving patient to room near nurses station  Outcome: Progressing  Goal: Maintain or return to baseline ADL function  Description: INTERVENTIONS:  -  Assess patient's ability to carry out ADLs; assess patient's baseline for ADL function and identify physical deficits which impact ability to perform ADLs (bathing, care of mouth/teeth, toileting, grooming, dressing, etc.)  - Assess/evaluate cause of self-care deficits   - Assess range of motion  - Assess patient's mobility; develop plan if impaired  - Assess patient's need for assistive devices and provide as appropriate  - Encourage maximum independence but intervene and supervise when necessary  - Involve family in performance of ADLs  - Assess for home care needs following discharge   - Consider OT consult to assist with ADL evaluation and planning for discharge  - Provide patient education as appropriate  Outcome: Progressing  Goal: Maintains/Returns to pre admission functional level  Description:  INTERVENTIONS:  - Perform AM-PAC 6 Click Basic Mobility/ Daily Activity assessment daily.  - Set and communicate daily mobility goal to care team and patient/family/caregiver.   - Collaborate with rehabilitation services on mobility goals if consulted  - Perform Range of Motion  times a day.  - Reposition patient every  hours.  - Dangle patient  times a day  - Stand patient  times a day  - Ambulate patient  times a day  - Out of bed to chair  times a day   - Out of bed for meals times a day  - Out of bed for toileting  - Record patient progress and toleration of activity level   Outcome: Progressing     Problem: DISCHARGE PLANNING  Goal: Discharge to home or other facility with appropriate resources  Description: INTERVENTIONS:  - Identify barriers to discharge w/patient and caregiver  - Arrange for needed discharge resources and transportation as appropriate  - Identify discharge learning needs (meds, wound care, etc.)  - Arrange for interpretive services to assist at discharge as needed  - Refer to Case Management Department for coordinating discharge planning if the patient needs post-hospital services based on physician/advanced practitioner order or complex needs related to functional status, cognitive ability, or social support system  Outcome: Progressing     Problem: Knowledge Deficit  Goal: Patient/family/caregiver demonstrates understanding of disease process, treatment plan, medications, and discharge instructions  Description: Complete learning assessment and assess knowledge base.  Interventions:  - Provide teaching at level of understanding  - Provide teaching via preferred learning methods  Outcome: Progressing

## 2024-03-14 NOTE — ASSESSMENT & PLAN NOTE
Previous history of CVA  Continue low-dose Eliquis  Has been previously evaluated for watchman which she declined

## 2024-03-14 NOTE — CASE MANAGEMENT
Case Management Discharge Planning Note    Patient name Mable Gallagher  [facility] 5 /E5 -* MRN [id number]  : 1930 Date 3/14/2024       Current Admission Date: 3/12/2024  Current Admission Diagnosis:Ambulatory dysfunction   Patient Active Problem List    Diagnosis Date Noted    History of CVA (cerebrovascular accident) 2024    Meningioma (HCC) 2024    Leiomyoma of body of uterus 2024    Hip pain 2024    Fall 2024    Ambulatory dysfunction 2024    Chronic rhinitis 2023    Macular degeneration     Second hand smoke exposure 2022    Post-viral cough syndrome 2022    Chronic diastolic (congestive) heart failure (HCC) 2021    Lower extremity edema 2021    Numbness of hand 2020    Ulnar nerve entrapment at elbow 2017    Vitamin D deficiency 2014    Premature ventricular contraction 2013    Hyperlipidemia 10/16/2012    Primary hypertension 10/16/2012    Permanent atrial fibrillation (HCC) 10/16/2012    Esophageal reflux 10/16/2012      LOS (days): 1  Geometric Mean LOS (GMLOS) (days): 2.6  Days to GMLOS:1.8     OBJECTIVE:  Risk of Unplanned Readmission Score: 8.12         Current admission status: Inpatient   Preferred Pharmacy:   [facility] [address] [address]  Phone: [phone] Fax: [phone]    [id number] [address] [address]  Phone: [phone] Fax: [phone]    Primary Care Provider: Catie Boyle MD    Primary Insurance: MEDICARE  Secondary Insurance: COMMERCIAL MISCELLANEOUS    DISCHARGE DETAILS:    Discharge planning discussed with:: Patient and Daughter  Freedom of Choice: Yes  Comments - Freedom of Choice: Pending facility acceptance.  CM contacted family/caregiver?: Yes  Were Treatment Team discharge recommendations reviewed with  patient/caregiver?: Yes  Did patient/caregiver verbalize understanding of patient care needs?: N/A- going to facility  Were patient/caregiver advised of the risks associated with not following Treatment Team discharge recommendations?: Yes    Contacts  Patient Contacts: Daughter  Relationship to Patient:: Family  Contact Method: In Person  Reason/Outcome: Discharge Planning, Referral    Requested Home Health Care         Is the patient interested in HHC at discharge?: No    DME Referral Provided  Referral made for DME?: Yes  DME referral completed for the following items:: Wheelchair  DME Supplier Name:: [employer]    Other Referral/Resources/Interventions Provided:  Interventions: Acute Rehab, Short Term Rehab  Referral Comments: Denied [facility], Referral placed for STR    Would you like to participate in our [facility] service program?  : No - Declined    Treatment Team Recommendation: Short Term Rehab      Additional Comments: CM spoke with patient and family at bedside to advise that both [facility] and [facility] declined. CM discussed alternative options such as STR and HHC services. Patient and family agreed to STR referral to be sent. CM to provide list for them to review once we have accepting facility. Patient has Medicare. Was changed to INPT status from OBS on 3/13/24. DCP will follow Medicare guidelines. Will continue to follow for any additional d/c needs.  Update: Patient and family reviewed STR list and have made choice for [facility]. CM reached out to facility in aidin. Will continue to follow for d/c per Medicare guidelines.

## 2024-03-14 NOTE — PROGRESS NOTES
Progress Note - Orthopedics   Prudence TITUS Gallagher 89 y.o. female MRN: [id number]  Unit/Bed#: E5 -01      Subjective:    89 y.o.female who is present 2 day s/p fall onto her left hip. At this time she reports that she is continuing to have moderate pains mostly to her posterior hip and thigh. She states that she now has been able to weight bear on her left leg while ambulating with her walker. She has some moderate soreness. She feels some stiffness and tightness with straight leg raise.   No acute events, no new complaints. Pain well controlled. Denies fevers, chills, CP, SOB, N/V, numbness or tingling.     Labs:  0   Lab Value Date/Time    HCT 41.1 03/12/2024 1626    HCT 44.6 07/10/2023 2056    HCT 38.9 12/15/2022 0041    HCT 44.0 01/08/2016 1029    HCT 39.8 05/13/2015 1034    HCT 40.0 03/10/2015 1051    HGB 13.2 03/12/2024 1626    HGB 13.9 07/10/2023 2056    HGB 12.0 12/15/2022 0041    HGB 14.0 01/08/2016 1029    HGB 12.3 05/13/2015 1034    HGB 12.2 03/10/2015 1051    INR 1.05 03/12/2024 1626    INR 2.45 (H) 01/08/2016 1029    WBC 5.94 03/12/2024 1626    WBC 7.82 07/10/2023 2056    WBC 7.29 12/15/2022 0041    WBC 10.30 (H) 01/08/2016 1029    WBC 8.00 05/13/2015 1034    WBC 9.49 03/10/2015 1051       Meds:    Current Facility-Administered Medications:     acetaminophen (TYLENOL) tablet 975 mg, 975 mg, Oral, Q8H ABI, Brigette Cano PA-C, 975 mg at 03/14/24 0524    apixaban (ELIQUIS) tablet 2.5 mg, 2.5 mg, Oral, BID, Brigette Cano PA-C, 2.5 mg at 03/14/24 0929    bisoprolol (ZEBETA) tablet 2.5 mg, 2.5 mg, Oral, Daily, Brigette Cano PA-C, 2.5 mg at 03/14/24 0929    digoxin (LANOXIN) tablet 125 mcg, 125 mcg, Oral, HS, Brigette Cano PA-C, 125 mcg at 03/13/24 2226    HYDROmorphone HCl (DILAUDID) injection 0.2 mg, 0.2 mg, Intravenous, Q6H PRN, Brigette Cano PA-C    ondansetron (ZOFRAN) injection 4 mg, 4 mg, Intravenous, Q6H PRN, Brigette Cano PA-C    oxyCODONE (ROXICODONE) split tablet 2.5  "mg, 2.5 mg, Oral, Q6H PRN, Brigette Cano PA-C    pravastatin (PRAVACHOL) tablet 20 mg, 20 mg, Oral, HS, Brigette Cano PA-C, 20 mg at 03/13/24 2226    Blood Culture:   No results found for: \"BLOODCX\"    Wound Culture:   No results found for: \"WOUNDCULT\"    Ins and Outs:  No intake/output data recorded.          Physical:  Vitals:    03/14/24 1134   BP: 166/71   Pulse: 77   Resp: 17   Temp: 98 °F (36.7 °C)   SpO2: 95%     Musculoskeletal: left Lower Extremity    Skin dry, and intact, no erythema or open wounds.  Painful range of motion of hip joint but no micromotion tenderness  No significant tenderness of the knee   + TTP Mid / proximal and lateral thigh  Sensation intact L3-S1  Negative logroll  Able to straight leg raise but weak 2/2 pain  No calf tenderness or pitting edema  2+ DP pulse  Digits warm and well perfused  Capillary refill < 2 seconds    Assessment:    89 y.o.female 2 days s/p fall with pain of her left posterior hip and posterior thigh. X-rays and CT are negative for hip fracture .     Plan:  WBAT to LLE  PT/OT  Ice to localized area  Pain control   Dispo: Ortho signing off  Patient to rehab facility per case management    Theresa Rodriguez PA-C     "

## 2024-03-14 NOTE — PROGRESS NOTES
[facility]  Progress Note  Name: Mable Gallagher I  MRN: [id number]  Unit/Bed#: E5 -01 I Date of Admission: 3/12/2024   Date of Service: 3/14/2024 I Hospital Day: 1    Assessment/Plan   * Ambulatory dysfunction  Assessment & Plan  Mechanical fall without any evidence of osseous abnormality  Suspect related to musculoskeletal etiology including irritation of iliotibial band and quadricep muscle group  Supportive care including topical and oral medication  Scheduled Tylenol ATC  Low-dose muscle relaxer at bedtime  Lidocaine patch  Physical therapy and Occupational Therapy evaluations, possible rehab placement if no improvement      Meningioma (HCC)  Assessment & Plan  Suspected by ct head but stable compared to prior imaging.  Incidental finding/asymptomatic  Op surveillance    History of CVA (cerebrovascular accident)  Assessment & Plan  Previous history of CVA  Continue low-dose Eliquis  Has been previously evaluated for watchman which she declined    Hip pain  Assessment & Plan  Continue weightbearing as tolerated to left lower extremity  Cleared by orthopedics    Leiomyoma of body of uterus  Assessment & Plan  Myomatous uterus of unclear significance in post menopausal patient but again noted incidentally on ct pelvis stable from 7/23  D/w pt and daughter they aren't interested in further evaluation and workup given advanced age    Chronic diastolic (congestive) heart failure (HCC)  Assessment & Plan  Wt Readings from Last 3 Encounters:   03/12/24 57 kg (125 lb 10.6 oz)   02/20/24 56.4 kg (124 lb 6.4 oz)   10/31/23 58.2 kg (128 lb 4.8 oz)     Maintained presently off lasix  Continue bisoprolol daily        Fall-resolved as of 3/14/2024  Assessment & Plan  Leaned too hard on her right side of her walker and lost balance.  Actually hurt left leg however falling in house.  No loc/presyncope.    -ct head negative for acute fracture/hemorrhage/ischemia, ct pelvis negative as well for  acute pathology  -consult pt/ot/cm  Supportive care                 Hospital Course:     89-year-old female patient presenting with ambulatory dysfunction in the setting of fall and left hip pain.  Patient with history of CVA, atrial fibrillation, chronic diastolic heart failure    Assessment:      Principal Problem:    Ambulatory dysfunction  Active Problems:    Chronic diastolic (congestive) heart failure (HCC)    Leiomyoma of body of uterus    Hip pain    History of CVA (cerebrovascular accident)    Meningioma (HCC)      Plan:    Start low-dose Flexeril at bedtime  Check B12 and TSH in a.m. given reported paresthesias  Geriatrics consultation       VTE Pharmacologic Prophylaxis:   Pharmacologic: Apixaban (Eliquis)  Mechanical VTE Prophylaxis in Place: Yes    AM-PAC Basic Mobility:  Basic Mobility Inpatient Raw Score: 18    JH-HLM Achieved: 6: Walk 10 steps or more  JH-HLM Goal: 6: Walk 10 steps or more    HLM Goal listed above. Continue with multidisciplinary rounding and encourage appropriate mobility to improve upon HLM goals.         Patient Centered Rounds: Case discussed and reviewed with nursing    Discussions with Specialists or Other Care Team Provider: Case management    Education and Discussions with Family / Patient: Discussed with patient family at bedside    Time Spent for Care: 80 minutes.  More than 50% of total time spent on counseling and coordination of care as described above.    Current Length of Stay: 1 day(s)    Current Patient Status: Inpatient   Certification Statement: The patient will continue to require additional inpatient hospital stay due to paresthesias, left leg pain, ambulatory dysfunction    Discharge Plan / Estimated Discharge Date: 48 to 72 hours based upon workup    Code Status: Level 3 - DNAR and DNI      Subjective:   Seen and examined, no acute complaints.  Still with left leg pain.  Cleared by orthopedics.  No chest pain no shortness of breath    A complete and  comprehensive 14 point organ system review has been performed and all other systems are negative other than stated above.    Objective:     Vitals:   Temp (24hrs), Av.9 °F (36.6 °C), Min:97.2 °F (36.2 °C), Max:98.4 °F (36.9 °C)    Temp:  [97.2 °F (36.2 °C)-98.4 °F (36.9 °C)] 98.4 °F (36.9 °C)  HR:  [56-77] 76  Resp:  [16-18] 16  BP: (125-177)/(60-83) 142/82  SpO2:  [94 %-97 %] 95 %  Body mass index is 22.26 kg/m².     Input and Output Summary (last 24 hours):     No intake or output data in the 24 hours ending 24 1800    Physical Exam:     General: well appearing, no acute distress  HEENT: atraumatic, PERRLA, moist mucosa, normal pharynx, normal tonsils and adenoids, normal tongue, no fluid in sinuses  Neck: Trachea midline, no carotid bruit, no masses  Respiratory: normal chest wall expansion, CTA B, no r/r/w, no rubs  Cardiovascular: Irregular irregular, no m/r/g, Normal S1 and S2  Abdomen: Soft, non-tender, non-distended, normal bowel sounds in all quadrants, no hepatosplenomegaly, no tympany  Rectal: deferred  Musculoskeletal: Left leg limited by pain, no paresthesia  Integumentary: warm, dry, and pink, with no rash, purpura, or petechia  Heme/Lymph: no lymphadenopathy, no bruises  Neurological: Cranial Nerves II-XII grossly intact  Psychiatric: cooperative with normal mood, affect, and cognition      Additional Data:     Labs:    Results from last 7 days   Lab Units 24  1626   WBC Thousand/uL 5.94   HEMOGLOBIN g/dL 13.2   HEMATOCRIT % 41.1   PLATELETS Thousands/uL 189   NEUTROS PCT % 75   LYMPHS PCT % 15   MONOS PCT % 8   EOS PCT % 1     Results from last 7 days   Lab Units 24  1626   POTASSIUM mmol/L 4.3   CHLORIDE mmol/L 107   CO2 mmol/L 25   BUN mg/dL 18   CREATININE mg/dL 0.68   CALCIUM mg/dL 9.0     Results from last 7 days   Lab Units 24  1626   INR  1.05       * I Have Reviewed All Lab Data Listed Above.  * Additional Pertinent Lab Tests Reviewed: All Labs For Current Hospital  Admission Reviewed    Imaging:    Imaging Reports Reviewed Today Include: No new imaging  Imaging Personally Reviewed by Myself Includes: No new imaging    Recent Cultures (last 7 days):           Last 24 Hours Medication List:   Current Facility-Administered Medications   Medication Dose Route Frequency Provider Last Rate    acetaminophen  975 mg Oral Q8H ABI Brigette Cano PA-C      apixaban  2.5 mg Oral BID Brigette Cano PA-C      bisoprolol  2.5 mg Oral Daily Brigette Cano PA-C      cyclobenzaprine  5 mg Oral HS Keith Olivares DO      digoxin  125 mcg Oral HS Brigette Cano PA-C      HYDROmorphone  0.2 mg Intravenous Q6H PRN Brigette Cano PA-C      lidocaine  1 patch Topical Daily Keith Olivares DO      menthol-methyl salicylate   Apply externally TID Keith Olivares DO      ondansetron  4 mg Intravenous Q6H PRN Brigette Cano PA-C      oxyCODONE  2.5 mg Oral Q6H PRN Brigette Cano PA-C      pravastatin  20 mg Oral HS Brigette Cano PA-C         AM-PAC Basic Mobility:  Basic Mobility Inpatient Raw Score: 18    JH-HLM Achieved: 6: Walk 10 steps or more  JH-HLM Goal: 6: Walk 10 steps or more    HLM Goal listed above. Continue with multidisciplinary rounding and encourage appropriate mobility to improve upon HLM goals.     Today, Patient Was Seen By: Keith Olivares DO    ** Please Note: This note was completed in part utilizing [employer] software dictation.  Grammatical errors, random word insertions, spelling mistakes, and incomplete sentences may be an occasional consequence of this system secondary to software limitations, ambient noise, and hardware issues.  If you have any questions or concerns about the content, text, or information contained within the body of this dictation, please contact the provider for clarification. **

## 2024-03-15 PROBLEM — E53.8 VITAMIN B 12 DEFICIENCY: Status: ACTIVE | Noted: 2024-03-15

## 2024-03-15 LAB
ANION GAP SERPL CALCULATED.3IONS-SCNC: 6 MMOL/L (ref 4–13)
BUN SERPL-MCNC: 15 MG/DL (ref 5–25)
CALCIUM SERPL-MCNC: 8.6 MG/DL (ref 8.4–10.2)
CHLORIDE SERPL-SCNC: 108 MMOL/L (ref 96–108)
CO2 SERPL-SCNC: 27 MMOL/L (ref 21–32)
CREAT SERPL-MCNC: 0.7 MG/DL (ref 0.6–1.3)
DME PARACHUTE DELIVERY DATE ACTUAL: NORMAL
DME PARACHUTE DELIVERY DATE EXPECTED: NORMAL
DME PARACHUTE DELIVERY DATE REQUESTED: NORMAL
DME PARACHUTE DELIVERY NOTE: NORMAL
DME PARACHUTE ITEM DESCRIPTION: NORMAL
DME PARACHUTE ORDER STATUS: NORMAL
DME PARACHUTE SUPPLIER NAME: NORMAL
DME PARACHUTE SUPPLIER PHONE: NORMAL
ERYTHROCYTE [DISTWIDTH] IN BLOOD BY AUTOMATED COUNT: 14.2 % (ref 11.6–15.1)
FLUAV RNA RESP QL NAA+PROBE: NEGATIVE
FLUBV RNA RESP QL NAA+PROBE: NEGATIVE
GFR SERPL CREATININE-BSD FRML MDRD: 74 ML/MIN/1.73SQ M
GLUCOSE SERPL-MCNC: 86 MG/DL (ref 65–140)
HCT VFR BLD AUTO: 38.8 % (ref 34.8–46.1)
HGB BLD-MCNC: 12.2 G/DL (ref 11.5–15.4)
MCH RBC QN AUTO: 29.8 PG (ref 26.8–34.3)
MCHC RBC AUTO-ENTMCNC: 31.4 G/DL (ref 31.4–37.4)
MCV RBC AUTO: 95 FL (ref 82–98)
PLATELET # BLD AUTO: 183 THOUSANDS/UL (ref 149–390)
PMV BLD AUTO: 10.3 FL (ref 8.9–12.7)
POTASSIUM SERPL-SCNC: 3.8 MMOL/L (ref 3.5–5.3)
RBC # BLD AUTO: 4.09 MILLION/UL (ref 3.81–5.12)
RSV RNA RESP QL NAA+PROBE: NEGATIVE
SARS-COV-2 RNA RESP QL NAA+PROBE: NEGATIVE
SODIUM SERPL-SCNC: 141 MMOL/L (ref 135–147)
TSH SERPL DL<=0.05 MIU/L-ACNC: 4.31 UIU/ML (ref 0.45–4.5)
VIT B12 SERPL-MCNC: 267 PG/ML (ref 180–914)
WBC # BLD AUTO: 5.71 THOUSAND/UL (ref 4.31–10.16)

## 2024-03-15 PROCEDURE — 85027 COMPLETE CBC AUTOMATED: CPT | Performed by: INTERNAL MEDICINE

## 2024-03-15 PROCEDURE — 84443 ASSAY THYROID STIM HORMONE: CPT | Performed by: INTERNAL MEDICINE

## 2024-03-15 PROCEDURE — 82607 VITAMIN B-12: CPT | Performed by: INTERNAL MEDICINE

## 2024-03-15 PROCEDURE — 0241U HB NFCT DS VIR RESP RNA 4 TRGT: CPT | Performed by: INTERNAL MEDICINE

## 2024-03-15 PROCEDURE — 80048 BASIC METABOLIC PNL TOTAL CA: CPT | Performed by: INTERNAL MEDICINE

## 2024-03-15 PROCEDURE — 99233 SBSQ HOSP IP/OBS HIGH 50: CPT | Performed by: INTERNAL MEDICINE

## 2024-03-15 PROCEDURE — 99222 1ST HOSP IP/OBS MODERATE 55: CPT

## 2024-03-15 RX ORDER — LANOLIN ALCOHOL/MO/W.PET/CERES
3 CREAM (GRAM) TOPICAL
Status: DISCONTINUED | OUTPATIENT
Start: 2024-03-15 | End: 2024-03-16 | Stop reason: HOSPADM

## 2024-03-15 RX ORDER — METHOCARBAMOL 500 MG/1
500 TABLET, FILM COATED ORAL
Status: DISCONTINUED | OUTPATIENT
Start: 2024-03-15 | End: 2024-03-16 | Stop reason: HOSPADM

## 2024-03-15 RX ADMIN — ACETAMINOPHEN 325MG 975 MG: 325 TABLET ORAL at 06:07

## 2024-03-15 RX ADMIN — MENTHOL, UNSPECIFIED FORM AND METHYL SALICYLATE: 10; 150 CREAM TOPICAL at 08:36

## 2024-03-15 RX ADMIN — MENTHOL, UNSPECIFIED FORM AND METHYL SALICYLATE 1 APPLICATION: 10; 150 CREAM TOPICAL at 21:22

## 2024-03-15 RX ADMIN — LIDOCAINE 5% 1 PATCH: 700 PATCH TOPICAL at 08:35

## 2024-03-15 RX ADMIN — BISOPROLOL FUMARATE 2.5 MG: 5 TABLET, FILM COATED ORAL at 08:38

## 2024-03-15 RX ADMIN — MENTHOL, UNSPECIFIED FORM AND METHYL SALICYLATE: 10; 150 CREAM TOPICAL at 17:17

## 2024-03-15 RX ADMIN — APIXABAN 2.5 MG: 2.5 TABLET, FILM COATED ORAL at 17:17

## 2024-03-15 RX ADMIN — CYANOCOBALAMIN TAB 500 MCG 1000 MCG: 500 TAB at 17:17

## 2024-03-15 RX ADMIN — DIGOXIN 125 MCG: 125 TABLET ORAL at 21:20

## 2024-03-15 RX ADMIN — Medication 3 MG: at 21:20

## 2024-03-15 RX ADMIN — APIXABAN 2.5 MG: 2.5 TABLET, FILM COATED ORAL at 08:35

## 2024-03-15 RX ADMIN — PRAVASTATIN SODIUM 20 MG: 20 TABLET ORAL at 21:20

## 2024-03-15 RX ADMIN — METHOCARBAMOL 500 MG: 500 TABLET ORAL at 21:20

## 2024-03-15 NOTE — CONSULTS
Consultation - Geriatrics   Prudence TITUS Gallagher 89 y.o. female MRN: [id number]  Unit/Bed#: E5 -01 Encounter: [id number]      Assessment/Plan    Cognitive Screening  Patient has no documented history of memory issues or cognitive impairment   Patient notes that she does have some forgetfulness at home   Most recent TSH on labs today noted to be 4.312  Most recent vitamin B 12 level on 10/24/2019 noted to be 247  This was ordered for today and value is pending   Would recommend supplementation for a level < 400   CT of the head on 3/12/2024 revealed mild chronic microvascular ischemic changes and a chronic right caudate head lacunar infarct   No MoCA or cognitive testing noted in epic  Patient is alert and oriented x 4 on exam today   Maintain delirium precautions as discussed below  Redirect and reorient as needed  Keep physically, mentally, and socially active     Delirium Precautions   Baseline mentation: alert and oriented x 4 but forgetful at times  Current mentation: alert and oriented x 4  Patient is at high risk secondary to age, fall, acute pain, vision impairment, hearing impairment, and hospitalization   Maintain delirium precautions   Provide redirection, reorientation, and distraction techniques  Maintain fall and safety precautions   Assist with ADLs/IADLs  Avoid deliriogenic medications such as tramadol, benzodiazepines, anticholinergics, benadryl  Treat pain using geriatric pain protocol   Encourage oral hydration and nutrition   Monitor for constipation and urinary retention   Implement sleep hygiene and limit night time interuptions   Maintain sleep-wake cycle   Encourage early and frequent mobilization   Most recent EKG on 3/12/2024 revealed a QTc interval of 393  If all other interventions are unsuccessful for acute agitation and behaviors, can consider Zyprexa 2.5 mg IM Q 8 hours   Would avoid benzodiazepines such as Ativan as these can worsen delirium     Deconditioning   Baseline function:  primarily independent with ADLs and needs assistance with IADLs  Patient is at increased risk for deconditioning secondary to fall, acute pain, weakness, gait dysfunction, and hospitalization    Continue to optimize diet, hydration, and mobility for healing   GFR 74 on labs today   Keep hydrated   Congestive heart failure   Patient with a known documented history   Most recent echo on 9/8/2022 revealed an EF of 55%  She is maintained off of diuretics   Patient appears euvolemic on exam today  Recommend low sodium diet   Continue to monitor weights and I&O  Monitor for signs and symptoms of infection, dehydration, DVT, and skin breakdown    Frailty   Clinical Frail Scale: 5- Mildly Frail  More evident slowing, needs help high order IADLs (transport, bills, medications)  Progressively impairs shopping and walking outside alone, meal prep and housework  Most recent albumin on 7/10/2023 noted to be 4.3  Consider nutrition consult  Encourage protein supplementation     Ambulatory Dysfunction/Falls  Patient presented to the hospital after sustaining a fall at home   She notes approximately 3 falls in the past 3 months   She ambulates with a roller walker at baseline    PT/OT consulted to assist with strengthening/mobility and assist with discharge planning to appropriate level of care  Assess patient frequently for physical needs, encourage use of assistant devices as needed and directed by PT/OT  Identify cognitive and physical deficits and behaviors that affect risk of falls  Consider moving patient closer to nursing station to monitor more closely for impulsive behavior which may increase risk of falls  [place] fall and safety precautions   Educate patient/family on patient safety including physical limitations and importance of using call bell for assistance   Modify environment to reduce risk of injury including disconnecting from pole when not in use, ensuring adequate lighting in room and restroom, ensuring that  path to restroom is clear and free of trip hazards  Out of bed as tolerated    Impaired Vision   Patient has a documented history of macular degeneration   She notes that she does wear eyeglasses but states that they do not really help her vision   Recommend use of corrective lenses at all appropriate times  Encourage adequate lighting and encourage use of assistance with ambulation  Keep personal belongings close to avoid reaching  Encourage appropriate footwear at all times  Recommend large font for printed materials provided to patient    Impaired Hearing   Patient states that she does have hearing impairment   She notes she used to wear hearing aids but states that she has not worn them in quite some time and they now no longer work  Hearing impairment strongly correlated with depression, cognitive impairment, delirium and falls in the older adult  Use hearing aids or sound amplifier  Speak face to face  Use clear dictation and enunciation of words    Dentition/Appetite   Patient does not wear dentures   She states she has a good appetite at baseline   Encourage use of dentures at all appropriate times  Ensure meal consistency is appropriate for all abilities   Consider nutrition consult   Continue aspiration precautions     Elimination   Patient is continent of bowel and bladder at baseline  She denies any voiding difficulties   She states that she does have some difficulty with constipation which she attributes to inactivity   Patient noted her last bowel movement was on 3/11  No bowel movement documented since admission   She is not currently on a bowel regimen   Monitor for constipation and urinary retention     Insomnia   Patient states that she has no difficulty sleeping at baseline   She does not take any medication for sleep   First line is behavioral therapy   Avoid sedative hypnotics including benzodiazepines and benadryl  Encourage staying awake during the day   Encourage daytime activities and  morning exercise   Decrease or eliminate daytime naps   Avoid caffeine especially during late afternoon and evening hours  Establish a nighttime routine  Implement sleep hygiene and limit nighttime interruptions  Can consider melatonin 3 mg daily at bedtime for sleep if needed     Anxiety/Depression  Patient has no documented history of anxiety or depression   He does not take any medication for this at baseline  Mood appears stable on exam today   Continue supportive care     Acute Pain due to Fall  Patient presented to the hospital status post fall onto her left hip   She reported moderate pain to her posterior hip/thigh   She describes the pain as sharp/shooting and states the pain is usually temporary   She denies pain with ambulation   She notes that she is not having pain on exam today as she is sitting in a comfortable position   Orthopedics was consulted and recommended WBAT  Suspect that her pain is musculoskeletal in nature   She was prescribed cyclobenzaprine last night   Would avoid this medication secondary to anticholinergic side effects   Discussed with patient and family at the bedside and agree to order only for bedtime   Would consider methocarbamol 250 or 500 mg at bedtime as opposed to cyclobenzaprine   Can continue with scheduled tylenol ATC   Can also use aqua K pad or other heating pad to the area  Continue nonpharmacological methods of pain management     Home Safety  Patient resides at home independently   She does have caregivers for 6 hours per day   She is primarily independent with ADLs and needs assistance with IADLs     Advanced Care Planning  Level 3 DNR     Home Medication Review   [facility] ([phone])  Digoxin 0.125 mg daily (last filled 1/29 for 90 day supply)  Pravastatin 20 mg daily (last filled 1/29 for 90 day supply)  Eliquis 2.5 mg BID (last filled 1/23 for 90 day supply)    Bisoprolol last filled on 9/22/2023    I have personally reviewed this medication list with  the patients pharmacy listed above.       History of Present Illness   Physician Requesting Consult: Keith Olivares DO  Reason for Consult / Principal Problem: Left hip pain  Hx and PE limited by: N/A    HPI: Mable QURESHI Aileen is a 89 y.o. year old female who has CHF, GERD, history of CVA, hyperlipidemia, meningioma, macular degeneration, and ambulatory dysfunction and presented to the hospital after sustaining a fall at home.  Her caregiver had just left the house when she was noted to have stood up and fell.  She denied lightheadedness.  She notes that her walker went out from under her.  She did have left lateral leg pain from the proximal thigh to above the knee and pain inability to bear weight.  X-ray imaging was negative for femur fracture, however, the patient did have persistent pain.  A CT of the pelvis was obtained and revealed no fracture.  She was admitted for pain control.  Orthopedics was consulted and initially recommended nonweightbearing to the left lower extremity until further imaging could be obtained.  She was evaluated again yesterday and was recommended to be weightbearing as tolerated to the left lower extremity.  Orthopedics has since signed off.  Geriatrics has been consulted for left hip pain.    The patient resides at home independently. She does have a daily caregiver who comes for approximately 6 hours per day. She is able to bathe and dress herself but she does get a shower with the help of the caregiver twice a week. She does have someone who comes in to clean the house and her family helps her with meals. She has someone helping her with her finances and her caregiver sets up her medication into the medication box for her. She notes that over the past month she has noticed some difficulty with her memory. She states she is forgetful at times. She notes that she has had a total of about 3 falls in the past 3 months. She states she ambulates with a roller walker. She notes that  she does have macular degeneration and difficulty with her vision. She states that she does have eyeglasses but they do not help her vision much. She admits to difficulty with her hearing stating that she used to have hearing aids but stopped wearing them once she was residing at home independently. She notes that her hearing aids no longer work and she no longer wears them. She states that she does not wear dentures and her appetite at home is good. She states that she does have some trouble with constipation at home due to inactivity. Her daughter at the bedside notes that she is taking a colace. She states that this helps her with bowel movements. She notes that she has no difficulty sleeping at home.     The patient was seen and evaluated today at the bedside for geriatric consult. She is noted to be lying in bed comfortably in no acute distress. She is alert and oriented x 4 on my exam today. She is currently denying pain. She notes that the pain she is getting is sharp and shooting. She admits that the pain is temporary and she denies pain with ambulation. She denies chest pain and shortness of breath. She notes that she has been nauseous from the food here. Family did bring food the other night which she tolerated well. She states that she is sleeping well here.     Care was coordinated with the patients daughter and son at the bedside. Discussed current pain regimen and recommendation to continue with the muscle relaxer. They did inquire about discharge. I spoke with Cori with case management and she notes that the patient will likely be discharged tomorrow as tonight would be her third midnight. Both patients son and daughter are aware and all questions were answered and addressed.     Care was coordinated with patients nurse Anderson. She notes no acute issues or events overnight.     Care was coordinated with Dr. Olivares with internal medicine.     Inpatient consult to Gerontology  Consult performed by:  KSENIA Maria  Consult ordered by: Keith Olivares DO        Review of Systems   Constitutional:  Positive for activity change. Negative for appetite change and fatigue.   HENT:  Positive for hearing loss. Negative for dental problem.    Eyes:  Positive for visual disturbance (hx of macular degeneration).   Respiratory:  Negative for cough and shortness of breath.    Cardiovascular:  Negative for chest pain and leg swelling.   Gastrointestinal:  Negative for abdominal distention, abdominal pain, constipation, diarrhea, nausea and vomiting.   Genitourinary:  Negative for difficulty urinating and dysuria.   Musculoskeletal:  Positive for gait problem. Negative for arthralgias.   Skin:  Negative for color change and pallor.   Neurological:  Positive for weakness. Negative for dizziness and headaches.   Psychiatric/Behavioral:  Negative for agitation, confusion and sleep disturbance. The patient is not nervous/anxious.        Historical Information   Past Medical History:   Diagnosis Date    A-fib (HCC)     Fracture of ankle     Hyperlipidemia     Hypertension     Macular degeneration     Osteoarthritis     Vitamin D deficiency      Past Surgical History:   Procedure Laterality Date    APPENDECTOMY      PERINEAL  PELVIC RECONSTRUCTION SURGERY       Social History   Social History     Substance and Sexual Activity   Alcohol Use No    Comment: Social drinker per [employer]     Social History     Substance and Sexual Activity   Drug Use No     Social History     Tobacco Use   Smoking Status Never   Smokeless Tobacco Never     Family History:   Family History   Problem Relation Age of Onset    Supraventricular tachycardia Family        Meds/Allergies   Current meds:   Current Facility-Administered Medications   Medication Dose Route Frequency    acetaminophen (TYLENOL) tablet 975 mg  975 mg Oral Q8H ABI    apixaban (ELIQUIS) tablet 2.5 mg  2.5 mg Oral BID    bisoprolol (ZEBETA) tablet 2.5 mg  2.5 mg Oral Daily     cyclobenzaprine (FLEXERIL) tablet 5 mg  5 mg Oral HS    digoxin (LANOXIN) tablet 125 mcg  125 mcg Oral HS    HYDROmorphone HCl (DILAUDID) injection 0.2 mg  0.2 mg Intravenous Q6H PRN    lidocaine (LIDODERM) 5 % patch 1 patch  1 patch Topical Daily    menthol-methyl salicylate (BENGAY) 10-15 % cream   Apply externally TID    ondansetron (ZOFRAN) injection 4 mg  4 mg Intravenous Q6H PRN    oxyCODONE (ROXICODONE) split tablet 2.5 mg  2.5 mg Oral Q6H PRN    pravastatin (PRAVACHOL) tablet 20 mg  20 mg Oral HS     No Known Allergies    Objective   Vitals: Blood pressure 118/61, pulse (!) 53, temperature 97.8 °F (36.6 °C), temperature source Oral, resp. rate 18, weight 57 kg (125 lb 10.6 oz), SpO2 94%.,Body mass index is 22.26 kg/m².      Physical Exam  Vitals and nursing note reviewed.   Constitutional:       General: She is not in acute distress.     Appearance: She is not ill-appearing.   HENT:      Head: Normocephalic.      Mouth/Throat:      Mouth: Mucous membranes are dry.   Eyes:      General: No scleral icterus.     Conjunctiva/sclera: Conjunctivae normal.   Cardiovascular:      Rate and Rhythm: Normal rate and regular rhythm.   Pulmonary:      Effort: Pulmonary effort is normal. No respiratory distress.   Abdominal:      General: Bowel sounds are normal. There is no distension.      Palpations: Abdomen is soft.      Tenderness: There is no abdominal tenderness.   Musculoskeletal:         General: No swelling or tenderness.   Skin:     General: Skin is warm and dry.   Neurological:      General: No focal deficit present.      Mental Status: She is alert and oriented to person, place, and time. Mental status is at baseline.      Motor: Weakness present.      Gait: Gait abnormal.         Lab Results:   Results from last 7 days   Lab Units 03/15/24  0619   WBC Thousand/uL 5.71   HEMOGLOBIN g/dL 12.2   HEMATOCRIT % 38.8   PLATELETS Thousands/uL 183        Results from last 7 days   Lab Units 03/15/24  0619   POTASSIUM  "mmol/L 3.8   CHLORIDE mmol/L 108   CO2 mmol/L 27   BUN mg/dL 15   CREATININE mg/dL 0.70   CALCIUM mg/dL 8.6       Imaging Studies: I have personally reviewed pertinent reports.    EKG, Pathology, and Other Studies: I have personally reviewed pertinent reports.    VTE Prophylaxis: Apixaban (Eliquis)    Code Status: Level 3 - DNAR and DNI      Please note:  Voice-recognition software may have been used in the preparation of this document.  Occasional wrong word or \"sound-alike\" substitutions may have occurred due to the inherent limitations of voice recognition software.  Interpretation should be guided by context. o  "

## 2024-03-15 NOTE — PLAN OF CARE

## 2024-03-15 NOTE — PLAN OF CARE

## 2024-03-15 NOTE — ASSESSMENT & PLAN NOTE
Mechanical fall without any evidence of osseous abnormality  Suspect related to musculoskeletal etiology including irritation of iliotibial band and quadricep muscle group  Supportive care including topical and oral medication  Scheduled Tylenol ATC  Low-dose muscle relaxer at bedtime  Lidocaine patch  Heating pad  Scheduled for short-term rehab tomorrow

## 2024-03-15 NOTE — CASE MANAGEMENT
Case Management Discharge Planning Note    Patient name Mable Gallagher  Location [facility] /E5 -* MRN [id number]  : 1930 Date 3/15/2024       Current Admission Date: 3/12/2024  Current Admission Diagnosis:Ambulatory dysfunction   Patient Active Problem List    Diagnosis Date Noted    History of CVA (cerebrovascular accident) 2024    Meningioma (HCC) 2024    Leiomyoma of body of uterus 2024    Hip pain 2024    Ambulatory dysfunction 2024    Chronic rhinitis 2023    Macular degeneration     Second hand smoke exposure 2022    Post-viral cough syndrome 2022    Chronic diastolic (congestive) heart failure (HCC) 2021    Lower extremity edema 2021    Numbness of hand 2020    Ulnar nerve entrapment at elbow 2017    Vitamin D deficiency 2014    Premature ventricular contraction 2013    Hyperlipidemia 10/16/2012    Esophageal reflux 10/16/2012      LOS (days): 2  Geometric Mean LOS (GMLOS) (days): 2.6  Days to GMLOS:0.7     OBJECTIVE:  Risk of Unplanned Readmission Score: 8.57         Current admission status: Inpatient   Preferred Pharmacy:   [facility] [address] [address]  Phone: [phone] Fax: [phone]    [id number] [place], [place] - [address] [address]  Phone: [phone] Fax: [phone]    Primary Care Provider: Catie Boyle MD    Primary Insurance: MEDICARE  Secondary Insurance: COMMERCIAL MISCELLANEOUS    DISCHARGE DETAILS:    Discharge planning discussed with:: Lauro Ko  Freedom of Choice: Yes  Comments - Freedom of Choice: Rekha Salmeron CM contacted family/caregiver?: Yes  Were Treatment Team discharge recommendations reviewed with patient/caregiver?: Yes  Did patient/caregiver verbalize understanding of patient care needs?: N/A- going to facility  Were  patient/caregiver advised of the risks associated with not following Treatment Team discharge recommendations?: Yes    Contacts  Patient Contacts: Daughter José Miguel [phone]  Relationship to Patient:: Family  Contact Method: Phone  Phone Number: José Miguel [phone]  Reason/Outcome: Discharge Planning    Requested Home Health Care         Is the patient interested in HHC at discharge?: No    DME Referral Provided  Referral made for DME?: Yes  DME referral completed for the following items:: Wheelchair  DME Supplier Name:: [employer]    Other Referral/Resources/Interventions Provided:  Interventions: Short Term Rehab, DME  Referral Comments: DCP to [facility]    Would you like to participate in our [facility] service program?  : No - Declined    Treatment Team Recommendation: Short Term Rehab  Discharge Destination Plan:: Short Term Rehab  Transport at Discharge : Stretcher van         Transfer Mode: Stretcher  Accompanied by: EMS personnel     IMM Given (Date):: 03/15/24  IMM Given to:: Patient  Family notified:: José Miguel [phone]  Additional Comments: CM spoke with [facility] via phone [phone] who confirmed they have a bed for admisison on Sat and CM to arrange transport for anytime. They requested a Rapid Covid Swab. MD placed order for test. PASRR completed and uploaded to [employer]. IMM reviewed with family and signed. CM spoke with DTR Stefani who feels a stretcher transport would be best for her mother. CM to reach out to CM d/c support if transport auth is needed. CM updated nurse report number and fax. ARIADNA confirmed WC to be delivered to DTR home. Weekend CM to reach out to family should their be any changes in d/c plan.    Accepting Facility Name, City & State : [facility]  Receiving Facility/Agency Phone Number: [phone]  Facility/Agency Fax Number: [phone]  Update: [facility] [phone]  Pickup at 11:30am EDT

## 2024-03-15 NOTE — PROGRESS NOTES
[facility]  Progress Note  Name: Mable Gallagher I  MRN: [id number]  Unit/Bed#: E5 -01 I Date of Admission: 3/12/2024   Date of Service: 3/15/2024 I Hospital Day: 2    Assessment/Plan   * Ambulatory dysfunction  Assessment & Plan  Mechanical fall without any evidence of osseous abnormality  Suspect related to musculoskeletal etiology including irritation of iliotibial band and quadricep muscle group  Supportive care including topical and oral medication  Scheduled Tylenol ATC  Low-dose muscle relaxer at bedtime  Lidocaine patch  Heating pad  Scheduled for short-term rehab tomorrow      Vitamin B 12 deficiency  Assessment & Plan  Start oral B12 repletion    Meningioma (HCC)  Assessment & Plan  Suspected by ct head but stable compared to prior imaging.  Incidental finding/asymptomatic  Op surveillance    History of CVA (cerebrovascular accident)  Assessment & Plan  Previous history of CVA  Continue low-dose Eliquis  Has been previously evaluated for watchman which she declined    Hip pain  Assessment & Plan  Continue weightbearing as tolerated to left lower extremity  Cleared by orthopedics    Leiomyoma of body of uterus  Assessment & Plan  Myomatous uterus of unclear significance in post menopausal patient but again noted incidentally on ct pelvis stable from 7/23  D/w pt and daughter they aren't interested in further evaluation and workup given advanced age    Chronic diastolic (congestive) heart failure (HCC)  Assessment & Plan  Wt Readings from Last 3 Encounters:   03/12/24 57 kg (125 lb 10.6 oz)   02/20/24 56.4 kg (124 lb 6.4 oz)   10/31/23 58.2 kg (128 lb 4.8 oz)     Maintained presently off lasix  Continue bisoprolol daily                     Hospital Course:     89-year-old female patient admitted with ambulatory dysfunction in the setting of fall.  Scheduled for short-term rehab placement.  Also found to have B12 deficiency.  Overnight tolerated muscle relaxer, no other  acute complaints today.  Feels well    Assessment:      Principal Problem:    Ambulatory dysfunction  Active Problems:    Chronic diastolic (congestive) heart failure (HCC)    Leiomyoma of body of uterus    Hip pain    History of CVA (cerebrovascular accident)    Meningioma (HCC)    Vitamin B 12 deficiency      Plan:    Geriatrics consultation for medication comanagement  Replete B12  Discharge planning       VTE Pharmacologic Prophylaxis:   Pharmacologic: Apixaban (Eliquis)  Mechanical VTE Prophylaxis in Place: Yes    AM-PAC Basic Mobility:  Basic Mobility Inpatient Raw Score: 18    JH-HLM Achieved: 6: Walk 10 steps or more  JH-HLM Goal: 6: Walk 10 steps or more    HLM Goal listed above. Continue with multidisciplinary rounding and encourage appropriate mobility to improve upon HLM goals.         Patient Centered Rounds: Case discussed and reviewed with nursing    Discussions with Specialists or Other Care Team Provider: Case management, geriatrics    Education and Discussions with Family / Patient: Daughter at bedside    Time Spent for Care: 80 minutes.  More than 50% of total time spent on counseling and coordination of care as described above.    Current Length of Stay: 2 day(s)    Current Patient Status: Inpatient   Certification Statement: The patient will continue to require additional inpatient hospital stay due to rehab placement    Discharge Plan / Estimated Discharge Date: Tomorrow    Code Status: Level 3 - DNAR and DNI      Subjective:   Seen and examined, no acute complaints.  Reports feeling improved today.  Slept well overnight, no nausea no vomiting no chest pain    A complete and comprehensive 14 point organ system review has been performed and all other systems are negative other than stated above.    Objective:     Vitals:   Temp (24hrs), Av.9 °F (36.6 °C), Min:97.6 °F (36.4 °C), Max:98.4 °F (36.9 °C)    Temp:  [97.6 °F (36.4 °C)-98.4 °F (36.9 °C)] 97.6 °F (36.4 °C)  HR:  [50-73] 73  Resp:   [17-18] 17  BP: (118-156)/(61-83) 156/83  SpO2:  [94 %-96 %] 96 %  Body mass index is 22.26 kg/m².     Input and Output Summary (last 24 hours):       Intake/Output Summary (Last 24 hours) at 3/15/2024 1920  Last data filed at 3/15/2024 1300  Gross per 24 hour   Intake 420 ml   Output 200 ml   Net 220 ml       Physical Exam:     General: well appearing, no acute distress  HEENT: atraumatic, PERRLA, moist mucosa, normal pharynx, normal tonsils and adenoids, normal tongue, no fluid in sinuses  Neck: Trachea midline, no carotid bruit, no masses  Respiratory: normal chest wall expansion, CTA B, no r/r/w, no rubs  Cardiovascular: RRR, no m/r/g, Normal S1 and S2  Abdomen: Soft, non-tender, non-distended, normal bowel sounds in all quadrants, no hepatosplenomegaly, no tympany  Rectal: deferred  Musculoskeletal: Moves all  Integumentary: warm, dry, and pink, with no rash, purpura, or petechia  Heme/Lymph: no lymphadenopathy, no bruises  Neurological: Cranial Nerves II-XII grossly intact  Psychiatric: cooperative with normal mood, affect, and cognition      Additional Data:     Labs:    Results from last 7 days   Lab Units 03/15/24  0619 03/12/24  1626   WBC Thousand/uL 5.71 5.94   HEMOGLOBIN g/dL 12.2 13.2   HEMATOCRIT % 38.8 41.1   PLATELETS Thousands/uL 183 189   NEUTROS PCT %  --  75   LYMPHS PCT %  --  15   MONOS PCT %  --  8   EOS PCT %  --  1     Results from last 7 days   Lab Units 03/15/24  0619   POTASSIUM mmol/L 3.8   CHLORIDE mmol/L 108   CO2 mmol/L 27   BUN mg/dL 15   CREATININE mg/dL 0.70   CALCIUM mg/dL 8.6     Results from last 7 days   Lab Units 03/12/24  1626   INR  1.05       * I Have Reviewed All Lab Data Listed Above.  * Additional Pertinent Lab Tests Reviewed: All Labs For Current Hospital Admission Reviewed    Imaging:    Imaging Reports Reviewed Today Include: No new imaging  Imaging Personally Reviewed by Myself Includes: No new imaging    Recent Cultures (last 7 days):           Last 24 Hours  Medication List:   Current Facility-Administered Medications   Medication Dose Route Frequency Provider Last Rate    acetaminophen  975 mg Oral Q8H ABI Brigette Cano PA-C      apixaban  2.5 mg Oral BID Brigette Cano PA-C      bisoprolol  2.5 mg Oral Daily Brigette Cano PA-C      cyanocobalamin  1,000 mcg Oral Daily Keith Olivares DO      digoxin  125 mcg Oral HS Brigette Cano PA-C      HYDROmorphone  0.2 mg Intravenous Q6H PRN Brigette Cano PA-C      lidocaine  1 patch Topical Daily Keith Olivares DO      melatonin  3 mg Oral HS Keith Olivares DO      menthol-methyl salicylate   Apply externally TID Keith Olivares DO      methocarbamol  500 mg Oral HS Keith Olivares DO      ondansetron  4 mg Intravenous Q6H PRN Brigette Cano PA-C      oxyCODONE  2.5 mg Oral Q6H PRN Brigette Cano PA-C      pravastatin  20 mg Oral HS Brigette Cano PA-C         AM-PAC Basic Mobility:  Basic Mobility Inpatient Raw Score: 18    JH-HLM Achieved: 6: Walk 10 steps or more  JH-HLM Goal: 6: Walk 10 steps or more    HLM Goal listed above. Continue with multidisciplinary rounding and encourage appropriate mobility to improve upon HLM goals.     Today, Patient Was Seen By: Keith Olivares DO    ** Please Note: This note was completed in part utilizing Nuance Dragon One Medical software dictation.  Grammatical errors, random word insertions, spelling mistakes, and incomplete sentences may be an occasional consequence of this system secondary to software limitations, ambient noise, and hardware issues.  If you have any questions or concerns about the content, text, or information contained within the body of this dictation, please contact the provider for clarification. **

## 2024-03-16 VITALS
WEIGHT: 125.66 LBS | OXYGEN SATURATION: 95 % | RESPIRATION RATE: 21 BRPM | SYSTOLIC BLOOD PRESSURE: 169 MMHG | TEMPERATURE: 97.9 F | DIASTOLIC BLOOD PRESSURE: 83 MMHG | BODY MASS INDEX: 22.26 KG/M2 | HEART RATE: 81 BPM

## 2024-03-16 PROCEDURE — 99239 HOSP IP/OBS DSCHRG MGMT >30: CPT | Performed by: INTERNAL MEDICINE

## 2024-03-16 RX ORDER — LIDOCAINE 50 MG/G
1 PATCH TOPICAL DAILY
Start: 2024-03-17

## 2024-03-16 RX ORDER — MUSCLE RUB CREAM 100; 150 MG/G; MG/G
CREAM TOPICAL 3 TIMES DAILY
Start: 2024-03-16

## 2024-03-16 RX ORDER — METHOCARBAMOL 500 MG/1
500 TABLET, FILM COATED ORAL
Start: 2024-03-16 | End: 2024-03-20

## 2024-03-16 RX ORDER — LANOLIN ALCOHOL/MO/W.PET/CERES
3 CREAM (GRAM) TOPICAL
Start: 2024-03-16

## 2024-03-16 RX ADMIN — BISOPROLOL FUMARATE 2.5 MG: 5 TABLET, FILM COATED ORAL at 08:27

## 2024-03-16 RX ADMIN — APIXABAN 2.5 MG: 2.5 TABLET, FILM COATED ORAL at 08:11

## 2024-03-16 RX ADMIN — CYANOCOBALAMIN TAB 500 MCG 1000 MCG: 500 TAB at 08:10

## 2024-03-16 RX ADMIN — LIDOCAINE 5% 1 PATCH: 700 PATCH TOPICAL at 08:11

## 2024-03-16 RX ADMIN — MENTHOL, UNSPECIFIED FORM AND METHYL SALICYLATE: 10; 150 CREAM TOPICAL at 08:17

## 2024-03-16 NOTE — PLAN OF CARE

## 2024-03-16 NOTE — DISCHARGE INSTR - AVS FIRST PAGE
Dear Mable Gallagher,     It was our pleasure to care for you here at [facility].  It is our hope that we were always able to exceed the expected standards for your care during your stay.  You were hospitalized due to fall resulting in amatory dysfunction you were cared for on the fifth floor by Keith Olivares DO with the [facility] who covers for your primary care physician (PCP), Catie Boyle MD, while you were hospitalized.  If you have any questions or concerns related to this hospitalization, you may contact us at .  For follow up as well as any medication refills, we recommend that you follow up with your primary care physician.  A registered nurse will reach out to you by phone within a few days after your discharge to answer any additional questions that you may have after going home.  However, at this time we provide for you here, the most important instructions / recommendations at discharge:     Notable Medication Adjustments -   B12 supplement  Continue Tylenol and short course of Robaxin  Testing Required after Discharge -   Repeat B12 levels in 4 to 6 weeks  Important follow up information -   PCP follow-up in 1 week  Other Instructions -   None  Please review this entire after visit summary as additional general instructions including medication list, appointments, activity, diet, any pertinent wound care, and other additional recommendations from your care team that may be provided for you.      Sincerely,     Keith Olivares DO and Leslei Michael RN

## 2024-03-16 NOTE — DISCHARGE SUMMARY
[facility]  Discharge- Mable Gallagher 7/9/1930, 89 y.o. female MRN: [id number]  Unit/Bed#: E5 -01 Encounter: [id number]  Primary Care Provider: Catie Boyle MD   Date and time admitted to hospital: 3/12/2024  3:57 PM        Admitting Provider:  Keith Olivares DO  Discharge Provider:  Keith Olivares DO  Admission Date: 3/12/2024       Discharge Date: 03/16/24   LOS: 3  Primary Care Physician at Discharge: Catie Boyle [phone]    HOSPITAL COURSE:  Mable Gallagher is a 89 y.o. female who presented with fall at home.  Her past medical history is notable for CHF GERD and history of CVA hyperlipidemia meningioma and macular degeneration.  She presented after a fall at home which lodged between the piano and wall.  She was found by caregiver to have significant left hip pain.  The patient was evaluated in consultation with orthopedic service and CT and plain film imaging demonstrated no acute fracture.  She was cleared by orthopedics for discharge.  She was evaluated in consultation by the geriatric service given her advanced age and comorbidities.  B12 levels were checked and showed low normal B12 and she was initiated on oral B12 repletion.      Patient subsequently was cleared medically for discharge, she was discharged to short-term rehab.    At the time of discharge the patient was tolerating oral diet they were without acute complaint and they were medically cleared for discharge.  All questions were answered the patient's satisfaction and they were in agreement with the discharge plan.    DISCHARGE DIAGNOSES  * Ambulatory dysfunction  Assessment & Plan  Mechanical fall without any evidence of osseous abnormality  Suspect related to musculoskeletal etiology including irritation of iliotibial band and quadricep muscle group  Supportive care including topical and oral medication  Scheduled Tylenol ATC  Low-dose muscle relaxer at bedtime  Lidocaine  patch  Heating pad  Scheduled for short-term rehab tomorrow      Vitamin B 12 deficiency  Assessment & Plan  Start oral B12 repletion    Meningioma (HCC)  Assessment & Plan  Suspected by ct head but stable compared to prior imaging.  Incidental finding/asymptomatic  Op surveillance    History of CVA (cerebrovascular accident)  Assessment & Plan  Previous history of CVA  Continue low-dose Eliquis  Has been previously evaluated for watchman which she declined    Hip pain  Assessment & Plan  Continue weightbearing as tolerated to left lower extremity  Cleared by orthopedics    Leiomyoma of body of uterus  Assessment & Plan  Myomatous uterus of unclear significance in post menopausal patient but again noted incidentally on ct pelvis stable from 7/23  D/w pt and daughter they aren't interested in further evaluation and workup given advanced age    Chronic diastolic (congestive) heart failure (HCC)  Assessment & Plan  Wt Readings from Last 3 Encounters:   03/12/24 57 kg (125 lb 10.6 oz)   02/20/24 56.4 kg (124 lb 6.4 oz)   10/31/23 58.2 kg (128 lb 4.8 oz)     Maintained presently off lasix  Continue bisoprolol daily          CONSULTING PROVIDERS   IP CONSULT TO CASE MANAGEMENT  IP CONSULT TO ORTHOPEDIC SURGERY  IP CONSULT TO GERONTOLOGY    PROCEDURES PERFORMED  * No surgery found *    RADIOLOGY RESULTS  XR femur 2 vw left    Result Date: 3/13/2024  Impression: No acute osseous abnormality. Workstation performed: [id number]     XR hip/pelv 2-3 vws left    Result Date: 3/13/2024  Impression: No acute osseous abnormality. Workstation performed: [id number]     CT pelvis wo contrast    Result Date: 3/12/2024  Impression: No acute osseous abnormality. Workstation performed: [id number]     CT head without contrast    Result Date: 3/12/2024  Impression: No interval change. No acute intracranial hemorrhage or depressed calvarial fracture. Workstation performed: [id number]       LABS  Results from last 7 days   Lab Units  "03/15/24  0619 03/12/24  1626   WBC Thousand/uL 5.71 5.94   HEMOGLOBIN g/dL 12.2 13.2   HEMATOCRIT % 38.8 41.1   MCV fL 95 93   PLATELETS Thousands/uL 183 189   INR   --  1.05     Results from last 7 days   Lab Units 03/15/24  0619 03/12/24  1626   SODIUM mmol/L 141 139   POTASSIUM mmol/L 3.8 4.3   CHLORIDE mmol/L 108 107   CO2 mmol/L 27 25   BUN mg/dL 15 18   CREATININE mg/dL 0.70 0.68   CALCIUM mg/dL 8.6 9.0   EGFR ml/min/1.73sq m 74 75   GLUCOSE RANDOM mg/dL 86 116                          Results from last 7 days   Lab Units 03/15/24  0619   TSH 3RD GENERATON uIU/mL 4.312               Cultures:         Invalid input(s): \"URIBILINOGEN\"        Results from last 7 days   Lab Units 03/15/24  1555   INFLUENZA A PCR  Negative     Results from last 7 days   Lab Units 03/15/24  1555   SARS-COV-2  Negative   INFLUENZA A PCR  Negative   INFLUENZA B PCR  Negative   RSV PCR  Negative         PHYSICAL EXAM:  Vitals:   Blood Pressure: 169/83 (03/16/24 0700)  Pulse: 81 (03/16/24 0700)  Temperature: 97.9 °F (36.6 °C) (03/15/24 2117)  Temp Source: Oral (03/15/24 2117)  Respirations: 21 (03/16/24 0700)  Weight - Scale: 57 kg (125 lb 10.6 oz) (03/12/24 1559)  SpO2: 95 % (03/16/24 0700)      General: well appearing, no acute distress  HEENT: atraumatic, PERRLA, moist mucosa, normal pharynx, normal tonsils and adenoids, normal tongue, no fluid in sinuses  Neck: Trachea midline, no carotid bruit, no masses  Respiratory: normal chest wall expansion, CTA B  Cardiovascular: RRR, no m/r/g, Normal S1 and S2  Abdomen: Soft, non-tender, non-distended, normal bowel sounds in all quadrants, no hepatosplenomegaly, no tympany  Rectal: deferred  Musculoskeletal: Moves all  Integumentary: warm, dry, and pink, with no visible rash, purpura, or petechia  Heme/Lymph: no lymphadenopathy, no bruises  Neurological: Cranial Nerves II-XII grossly intact  Psychiatric: cooperative with normal mood, affect, and cognition       Discharge Disposition: Non " [facility] SNF/TCU/SNU, [facility]    AM-PAC Basic Mobility:  Basic Mobility Inpatient Raw Score: 18    JH-HLM Achieved: 6: Walk 10 steps or more  JH-HLM Goal: 6: Walk 10 steps or more    HLM Goal listed above. Continue with ongoing physical therapy and encourage appropriate mobility to improve upon HLM goals.      Test Results Pending at Discharge:           Medications   Summary of Medication Adjustments made as a result of this hospitalization: See discharge summary and AVS for medication changes  Medication Dosing Tapers - Please refer to Discharge Medication List for details on any medication dosing tapers (if applicable to patient).  Discharge Medication List: See after visit summary for reconciled discharge medications.     Diet restrictions:         Diet Orders   (From admission, onward)                 Start     Ordered    03/13/24 0859  Diet Cardiovascular; Cardiac  Diet effective now        References:    Adult Nutrition Support Algorithm    RD Therapeutic Diet Order Protocol   Question Answer Comment   Diet Type Cardiovascular    Cardiac Cardiac    RD to adjust diet per protocol? Yes        03/13/24 0858                  Activity restrictions: No strenuous activity  Discharge Condition: good    Outpatient Follow-Up and Discharge Instructions  See after visit summary section titled Discharge Instructions for information provided to patient and family.      Code Status: Level 3 - DNAR and DNI  Discharge Statement   I spent 86 minutes discharging the patient. This time was spent on the day of discharge. Greater than 50% of total time was spent with the patient and / or family counseling and / or coordination of care.    ** Please Note: This note was completed in part utilizing Nuance Dragon Medical One Software.  Grammatical errors, random word insertions, spelling mistakes, and incomplete sentences may be an occasional consequence of this system secondary to software limitations, ambient noise, and  hardware issues.  If you have any questions or concerns about the content, text, or information contained within the body of this dictation, please contact the provider for clarification.**

## 2024-03-16 NOTE — PLAN OF CARE
Problem: Potential for Falls  Goal: Patient will remain free of falls  Description: INTERVENTIONS:  - Educate patient/family on patient safety including physical limitations  - Instruct patient to call for assistance with activity   - Consult OT/PT to assist with strengthening/mobility   - Keep Call bell within reach  - Keep bed low and locked with side rails adjusted as appropriate  - Keep care items and personal belongings within reach  - Initiate and maintain comfort rounds  - Make Fall Risk Sign visible to staff  - Offer Toileting every 2 Hours, in advance of need  - Initiate/Maintain bed alarm  - Obtain necessary fall risk management equipment:   - Apply yellow socks and bracelet for high fall risk patients  - Consider moving patient to room near nurses station  Outcome: Progressing     Problem: PAIN - ADULT  Goal: Verbalizes/displays adequate comfort level or baseline comfort level  Description: Interventions:  - Encourage patient to monitor pain and request assistance  - Assess pain using appropriate pain scale  - Administer analgesics based on type and severity of pain and evaluate response  - Implement non-pharmacological measures as appropriate and evaluate response  - Consider cultural and social influences on pain and pain management  - Notify physician/advanced practitioner if interventions unsuccessful or patient reports new pain  Outcome: Progressing     Problem: INFECTION - ADULT  Goal: Absence or prevention of progression during hospitalization  Description: INTERVENTIONS:  - Assess and monitor for signs and symptoms of infection  - Monitor lab/diagnostic results  - Monitor all insertion sites, i.e. indwelling lines, tubes, and drains  - Monitor endotracheal if appropriate and nasal secretions for changes in amount and color  - [place] appropriate cooling/warming therapies per order  - Administer medications as ordered  - Instruct and encourage patient and family to use good hand hygiene  technique  - Identify and instruct in appropriate isolation precautions for identified infection/condition  Outcome: Progressing  Goal: Absence of fever/infection during neutropenic period  Description: INTERVENTIONS:  - Monitor WBC    Outcome: Progressing

## 2024-03-18 ENCOUNTER — PATIENT OUTREACH (OUTPATIENT)
Dept: CASE MANAGEMENT | Facility: OTHER | Age: 89
End: 2024-03-18

## 2024-03-18 NOTE — PROGRESS NOTES
Outpatient care management referral via HRR report 3/18/24.Discharged to [facility] 3/16/24. Email sent to facility to inform them I will be following the patient during their skilled stay.  This Admin Coordinator will continue to monitor via chart review.

## 2024-03-21 ENCOUNTER — TELEPHONE (OUTPATIENT)
Age: 89
End: 2024-03-21

## 2024-03-21 ENCOUNTER — PATIENT OUTREACH (OUTPATIENT)
Dept: CASE MANAGEMENT | Facility: OTHER | Age: 89
End: 2024-03-21

## 2024-03-21 NOTE — TELEPHONE ENCOUNTER
Roberto suero [facility] called to schedule and appt. Called office, nurse stated that all appts should be scheduled at time of discharge.

## 2024-03-21 NOTE — PROGRESS NOTES
Chart review completed.  Email sent to  SNF Coordinator to obtain an update on patient.   This Admin Coordinator will continue to monitor via chart review throughout episode.

## 2024-03-22 ENCOUNTER — HOME HEALTH ADMISSION (OUTPATIENT)
Dept: HOME HEALTH SERVICES | Facility: HOME HEALTHCARE | Age: 89
End: 2024-03-22
Payer: MEDICARE

## 2024-03-24 ENCOUNTER — HOME CARE VISIT (OUTPATIENT)
Dept: HOME HEALTH SERVICES | Facility: HOME HEALTHCARE | Age: 89
End: 2024-03-24
Payer: MEDICARE

## 2024-03-24 VITALS — HEART RATE: 81 BPM | OXYGEN SATURATION: 95 % | DIASTOLIC BLOOD PRESSURE: 76 MMHG | SYSTOLIC BLOOD PRESSURE: 144 MMHG

## 2024-03-24 PROCEDURE — 400013 VN SOC

## 2024-03-24 PROCEDURE — G0151 HHCP-SERV OF PT,EA 15 MIN: HCPCS

## 2024-03-24 PROCEDURE — 10330081 VN NO-PAY CLAIM PROCEDURE

## 2024-03-25 ENCOUNTER — HOME CARE VISIT (OUTPATIENT)
Dept: HOME HEALTH SERVICES | Facility: HOME HEALTHCARE | Age: 89
End: 2024-03-25
Payer: MEDICARE

## 2024-03-25 ENCOUNTER — TRANSITIONAL CARE MANAGEMENT (OUTPATIENT)
Dept: FAMILY MEDICINE CLINIC | Facility: CLINIC | Age: 89
End: 2024-03-25

## 2024-03-25 ENCOUNTER — PATIENT OUTREACH (OUTPATIENT)
Dept: CASE MANAGEMENT | Facility: OTHER | Age: 89
End: 2024-03-25

## 2024-03-25 DIAGNOSIS — I50.32 CHRONIC DIASTOLIC (CONGESTIVE) HEART FAILURE (HCC): Primary | ICD-10-CM

## 2024-03-25 PROCEDURE — G0321 AUDIO-ONLY HHS: HCPCS

## 2024-03-25 NOTE — CASE COMMUNICATION
[facility] has admitted your patient to Home Health service with the following disciplines:    PT and OT    This report is informational only, no response is needed  Primary focus of home health care.. Musculoskeletal  Patient stated goals of care.. I want to be around as long as possible, but not long enough to be a burden to anyone.  I want to be able to get around without frightening myself.  Anticipated visit pattern and next vi sit date.. [id number]    See medication list - meds in home differ from AVS..  Pt reports no longer taking bisoprolol, vit b12, lidocaine, melatonin, bengay.     Significant clinical findings.. Pt demonstrates continue pain LLE, strength deficits ble, balance deficits.  Pt demos diminished safety and balance during ambulation and transfers and requires education to improve home safety.     Potential barriers to goal achievement.. Pain, w eakness.     Thank you for allowing us to participate in the care of your patient.

## 2024-03-25 NOTE — PROGRESS NOTES
P[facility] email alert received the patient discharged 3/23/24 to Home with [facility]. I have removed myself off of the care team, added the CM to the care team who will follow the patient through the episode, sent the care manager an inbasket notifying them of the HRR Referal.  Ambulatory referral placed for complex care management.  A email was sent to the facility requesting discharge instructions. When Admin Coordinator has received the Discharge paperwork  Admin Coordinator will attach to this encounter.    Removed intact

## 2024-03-26 ENCOUNTER — HOME CARE VISIT (OUTPATIENT)
Dept: HOME HEALTH SERVICES | Facility: HOME HEALTHCARE | Age: 89
End: 2024-03-26
Payer: MEDICARE

## 2024-03-26 VITALS — DIASTOLIC BLOOD PRESSURE: 68 MMHG | SYSTOLIC BLOOD PRESSURE: 140 MMHG | HEART RATE: 100 BPM | OXYGEN SATURATION: 98 %

## 2024-03-26 PROCEDURE — G0152 HHCP-SERV OF OT,EA 15 MIN: HCPCS

## 2024-03-28 ENCOUNTER — HOME CARE VISIT (OUTPATIENT)
Dept: HOME HEALTH SERVICES | Facility: HOME HEALTHCARE | Age: 89
End: 2024-03-28
Payer: MEDICARE

## 2024-03-28 VITALS
SYSTOLIC BLOOD PRESSURE: 130 MMHG | DIASTOLIC BLOOD PRESSURE: 70 MMHG | HEART RATE: 63 BPM | OXYGEN SATURATION: 97 % | TEMPERATURE: 97.7 F

## 2024-03-28 PROCEDURE — G0152 HHCP-SERV OF OT,EA 15 MIN: HCPCS

## 2024-03-29 ENCOUNTER — HOME CARE VISIT (OUTPATIENT)
Dept: HOME HEALTH SERVICES | Facility: HOME HEALTHCARE | Age: 89
End: 2024-03-29
Payer: MEDICARE

## 2024-03-29 ENCOUNTER — PATIENT OUTREACH (OUTPATIENT)
Dept: FAMILY MEDICINE CLINIC | Facility: CLINIC | Age: 89
End: 2024-03-29

## 2024-03-29 NOTE — PROGRESS NOTES
Contacted patient for f/u post hospitalization and rehab stay.  She presented to hospital following a fall at home with c/o L hip pain.  No fracture found.  She was sent to rehab and discharged home with home health services of PT/OT.  Patient denies any pain in her L hip.  She is using a wheelchair more often than ambulating.  She lives alone but receives caregiver assistance from 8-2 most days.  She has very supportive family and denies needing additional assistance at home.  Nutritional intake adequate.  Her family provides meals and she also receives meals from [employer] where she has been a long term member.  She does not weigh at home.  No c/o chest pain, sob or LE edema.  Medications are managed in weekly pill box and she takes all as prescribed.  Follow up appointments scheduled.  No transportation issues.  She denies any needs at this time.

## 2024-04-01 ENCOUNTER — HOME CARE VISIT (OUTPATIENT)
Dept: HOME HEALTH SERVICES | Facility: HOME HEALTHCARE | Age: 89
End: 2024-04-01
Payer: MEDICARE

## 2024-04-01 VITALS — HEART RATE: 62 BPM | DIASTOLIC BLOOD PRESSURE: 82 MMHG | SYSTOLIC BLOOD PRESSURE: 140 MMHG | OXYGEN SATURATION: 94 %

## 2024-04-01 PROCEDURE — G0151 HHCP-SERV OF PT,EA 15 MIN: HCPCS

## 2024-04-03 ENCOUNTER — HOME CARE VISIT (OUTPATIENT)
Dept: HOME HEALTH SERVICES | Facility: HOME HEALTHCARE | Age: 89
End: 2024-04-03
Payer: MEDICARE

## 2024-04-03 VITALS — HEART RATE: 80 BPM | DIASTOLIC BLOOD PRESSURE: 70 MMHG | OXYGEN SATURATION: 96 % | SYSTOLIC BLOOD PRESSURE: 142 MMHG

## 2024-04-03 PROCEDURE — G0152 HHCP-SERV OF OT,EA 15 MIN: HCPCS

## 2024-04-04 ENCOUNTER — HOME CARE VISIT (OUTPATIENT)
Dept: HOME HEALTH SERVICES | Facility: HOME HEALTHCARE | Age: 89
End: 2024-04-04
Payer: MEDICARE

## 2024-04-05 ENCOUNTER — HOME CARE VISIT (OUTPATIENT)
Dept: HOME HEALTH SERVICES | Facility: HOME HEALTHCARE | Age: 89
End: 2024-04-05
Payer: MEDICARE

## 2024-04-05 VITALS — HEART RATE: 88 BPM | SYSTOLIC BLOOD PRESSURE: 140 MMHG | OXYGEN SATURATION: 94 % | DIASTOLIC BLOOD PRESSURE: 85 MMHG

## 2024-04-05 VITALS — HEART RATE: 92 BPM | DIASTOLIC BLOOD PRESSURE: 72 MMHG | OXYGEN SATURATION: 97 % | SYSTOLIC BLOOD PRESSURE: 138 MMHG

## 2024-04-05 PROCEDURE — G0152 HHCP-SERV OF OT,EA 15 MIN: HCPCS

## 2024-04-05 PROCEDURE — G0151 HHCP-SERV OF PT,EA 15 MIN: HCPCS

## 2024-04-08 ENCOUNTER — HOME CARE VISIT (OUTPATIENT)
Dept: HOME HEALTH SERVICES | Facility: HOME HEALTHCARE | Age: 89
End: 2024-04-08
Payer: MEDICARE

## 2024-04-08 VITALS — HEART RATE: 102 BPM | OXYGEN SATURATION: 94 % | DIASTOLIC BLOOD PRESSURE: 84 MMHG | SYSTOLIC BLOOD PRESSURE: 132 MMHG

## 2024-04-08 PROCEDURE — G0151 HHCP-SERV OF PT,EA 15 MIN: HCPCS

## 2024-04-10 ENCOUNTER — HOME CARE VISIT (OUTPATIENT)
Dept: HOME HEALTH SERVICES | Facility: HOME HEALTHCARE | Age: 89
End: 2024-04-10
Payer: MEDICARE

## 2024-04-10 VITALS — SYSTOLIC BLOOD PRESSURE: 140 MMHG | OXYGEN SATURATION: 94 % | HEART RATE: 84 BPM | DIASTOLIC BLOOD PRESSURE: 86 MMHG

## 2024-04-10 PROCEDURE — G0151 HHCP-SERV OF PT,EA 15 MIN: HCPCS

## 2024-04-15 ENCOUNTER — HOME CARE VISIT (OUTPATIENT)
Dept: HOME HEALTH SERVICES | Facility: HOME HEALTHCARE | Age: 89
End: 2024-04-15
Payer: MEDICARE

## 2024-04-15 VITALS — DIASTOLIC BLOOD PRESSURE: 82 MMHG | SYSTOLIC BLOOD PRESSURE: 145 MMHG | OXYGEN SATURATION: 94 % | HEART RATE: 70 BPM

## 2024-04-15 PROCEDURE — G0151 HHCP-SERV OF PT,EA 15 MIN: HCPCS

## 2024-04-17 ENCOUNTER — HOME CARE VISIT (OUTPATIENT)
Dept: HOME HEALTH SERVICES | Facility: HOME HEALTHCARE | Age: 89
End: 2024-04-17
Payer: MEDICARE

## 2024-04-17 VITALS — SYSTOLIC BLOOD PRESSURE: 138 MMHG | HEART RATE: 70 BPM | OXYGEN SATURATION: 94 % | DIASTOLIC BLOOD PRESSURE: 88 MMHG

## 2024-04-17 PROCEDURE — G0151 HHCP-SERV OF PT,EA 15 MIN: HCPCS

## 2024-04-20 DIAGNOSIS — I48.0 PAF (PAROXYSMAL ATRIAL FIBRILLATION) (HCC): ICD-10-CM

## 2024-04-23 RX ORDER — APIXABAN 2.5 MG/1
TABLET, FILM COATED ORAL
Qty: 180 TABLET | Refills: 3 | Status: SHIPPED | OUTPATIENT
Start: 2024-04-23

## 2024-04-24 ENCOUNTER — HOME CARE VISIT (OUTPATIENT)
Dept: HOME HEALTH SERVICES | Facility: HOME HEALTHCARE | Age: 89
End: 2024-04-24
Payer: MEDICARE

## 2024-04-24 ENCOUNTER — RA CDI HCC (OUTPATIENT)
Dept: OTHER | Facility: HOSPITAL | Age: 89
End: 2024-04-24

## 2024-04-24 VITALS — HEART RATE: 66 BPM | SYSTOLIC BLOOD PRESSURE: 144 MMHG | DIASTOLIC BLOOD PRESSURE: 78 MMHG | OXYGEN SATURATION: 94 %

## 2024-04-24 PROCEDURE — G0151 HHCP-SERV OF PT,EA 15 MIN: HCPCS

## 2024-04-25 ENCOUNTER — TELEPHONE (OUTPATIENT)
Dept: ADMINISTRATIVE | Facility: OTHER | Age: 89
End: 2024-04-25

## 2024-04-25 NOTE — TELEPHONE ENCOUNTER
04/25/24 11:57 AM    Patient contacted (left message) to bring Advance Directive, POLST, or Living Will document to next scheduled pcp visit.    Thank you.  Yulsisa Pike PG [facility]

## 2024-04-26 ENCOUNTER — HOME CARE VISIT (OUTPATIENT)
Dept: HOME HEALTH SERVICES | Facility: HOME HEALTHCARE | Age: 89
End: 2024-04-26
Payer: MEDICARE

## 2024-04-26 VITALS — OXYGEN SATURATION: 93 % | HEART RATE: 68 BPM | DIASTOLIC BLOOD PRESSURE: 82 MMHG | SYSTOLIC BLOOD PRESSURE: 144 MMHG

## 2024-04-26 PROCEDURE — G0151 HHCP-SERV OF PT,EA 15 MIN: HCPCS

## 2024-04-30 ENCOUNTER — OFFICE VISIT (OUTPATIENT)
Dept: FAMILY MEDICINE CLINIC | Facility: CLINIC | Age: 89
End: 2024-04-30
Payer: MEDICARE

## 2024-04-30 VITALS
SYSTOLIC BLOOD PRESSURE: 138 MMHG | OXYGEN SATURATION: 93 % | HEART RATE: 92 BPM | BODY MASS INDEX: 21.19 KG/M2 | WEIGHT: 119.6 LBS | DIASTOLIC BLOOD PRESSURE: 82 MMHG | TEMPERATURE: 97.8 F | HEIGHT: 63 IN

## 2024-04-30 DIAGNOSIS — R26.2 AMBULATORY DYSFUNCTION: ICD-10-CM

## 2024-04-30 DIAGNOSIS — E53.8 VITAMIN B 12 DEFICIENCY: Primary | ICD-10-CM

## 2024-04-30 DIAGNOSIS — I50.32 CHRONIC DIASTOLIC (CONGESTIVE) HEART FAILURE (HCC): ICD-10-CM

## 2024-04-30 DIAGNOSIS — M25.559 HIP PAIN, UNSPECIFIED LATERALITY: ICD-10-CM

## 2024-04-30 PROCEDURE — 99214 OFFICE O/P EST MOD 30 MIN: CPT | Performed by: FAMILY MEDICINE

## 2024-04-30 PROCEDURE — G2211 COMPLEX E/M VISIT ADD ON: HCPCS | Performed by: FAMILY MEDICINE

## 2024-04-30 NOTE — PROGRESS NOTES
Subjective:    ERIK Akins is a 89 y.o. female here today for:  Chief Complaint   Patient presents with    Follow-up   .      ---Above per clinical staff & reviewed. ---  HPI:  89yof here for follow up  Had gone to ER 3/12/2024 after fall  Did not have any broken bones  Had significant hip/thigh which was most likely musculoskeletal  Pt using wheelchair now, had been using walker prior to fall  Did have PT at house but not any longer- states she does exercises herself  Has caretaker 8am-4pm  Sleeps thru night, does not need to wake up at all  Eating well per pt  Occasional swelling of lower legs and occasionally constipation  B12 on lower side of normal- recommended supplement  Will recheck roddy few months    Hospital records reviewed, went to short term rehab after hospital stay    The following portions of the patient's history were reviewed and updated as appropriate: allergies, current medications, past family history, past medical history, past social history, past surgical history and problem list.    Past Medical History:   Diagnosis Date    A-fib (HCC)     Fracture of ankle     Hyperlipidemia     Hypertension     Macular degeneration     Osteoarthritis     Vitamin D deficiency        Past Surgical History:   Procedure Laterality Date    APPENDECTOMY      PERINEAL  PELVIC RECONSTRUCTION SURGERY         Social History     Socioeconomic History    Marital status:      Spouse name: None    Number of children: None    Years of education: None    Highest education level: None   Occupational History    None   Tobacco Use    Smoking status: Never    Smokeless tobacco: Never   Vaping Use    Vaping status: Never Used   Substance and Sexual Activity    Alcohol use: No     Comment: Social drinker per [employer]    Drug use: No    Sexual activity: Not Currently   Other Topics Concern    None   Social History Narrative    None     Social Determinants of Health     Financial Resource Strain: Low Risk  (10/31/2023)     Overall Financial Resource Strain (CARDIA)     Difficulty of Paying Living Expenses: Not hard at all   Food Insecurity: No Food Insecurity (3/16/2024)    Hunger Vital Sign     Worried About Running Out of Food in the Last Year: Never true     Ran Out of Food in the Last Year: Never true   Transportation Needs: No Transportation Needs (3/16/2024)    PRAPARE - Transportation     Lack of Transportation (Medical): No     Lack of Transportation (Non-Medical): No   Physical Activity: Not on file   Stress: Not on file   Social Connections: Not on file   Intimate Partner Violence: Not on file   Housing Stability: Low Risk  (3/16/2024)    Housing Stability Vital Sign     Unable to Pay for Housing in the Last Year: No     Number of Places Lived in the Last Year: 1     Unstable Housing in the Last Year: No       Current Outpatient Medications   Medication Sig Dispense Refill    bisoprolol (ZEBETA) 5 mg tablet Take 0.5 tablets (2.5 mg total) by mouth see administration instructions One half to one tablet daily as directed by physician 90 tablet 2    digoxin (LANOXIN) 0.125 mg tablet Take 1 tablet (125 mcg total) by mouth daily 90 tablet 3    Eliquis 2.5 MG TAKE 1 TABLET TWICE A  tablet 3    pravastatin (PRAVACHOL) 20 mg tablet Take 1 tablet (20 mg total) by mouth daily at bedtime 90 tablet 3    cyanocobalamin (VITAMIN B-12) 1000 MCG tablet Take 1 tablet (1,000 mcg total) by mouth daily (Patient not taking: Reported on 3/24/2024)      lidocaine (LIDODERM) 5 % Apply 1 patch topically over 12 hours daily Remove & Discard patch within 12 hours or as directed by MD (Patient not taking: Reported on 3/24/2024)      melatonin 3 mg Take 1 tablet (3 mg total) by mouth daily at bedtime (Patient not taking: Reported on 3/24/2024)      menthol-methyl salicylate (BENGAY) 10-15 % cream Apply topically 3 (three) times a day (Patient not taking: Reported on 3/24/2024)      methocarbamol (ROBAXIN) 500 mg tablet Take 1 tablet (500 mg  "total) by mouth daily at bedtime for 4 days       No current facility-administered medications for this visit.        Review of Systems   Constitutional: Negative.  Negative for chills and fever.   HENT: Negative.  Negative for ear pain and sore throat.    Eyes:  Negative for pain and visual disturbance.   Respiratory: Negative.  Negative for cough and shortness of breath.    Cardiovascular: Negative.  Negative for chest pain and palpitations.   Gastrointestinal:  Positive for constipation. Negative for abdominal pain and vomiting.   Genitourinary: Negative.  Negative for dysuria and hematuria.   Musculoskeletal:  Positive for arthralgias and gait problem. Negative for back pain.   Skin:  Negative for color change and rash.   Neurological:  Positive for weakness. Negative for seizures and syncope.   Psychiatric/Behavioral: Negative.          Objective:    /82 (BP Location: Left arm, Patient Position: Sitting, Cuff Size: Adult)   Pulse 92   Temp 97.8 °F (36.6 °C) (Temporal)   Ht 5' 3\" (1.6 m)   Wt 54.3 kg (119 lb 9.6 oz)   SpO2 93%   BMI 21.19 kg/m²   Wt Readings from Last 3 Encounters:   04/30/24 54.3 kg (119 lb 9.6 oz)   03/12/24 57 kg (125 lb 10.6 oz)   02/20/24 56.4 kg (124 lb 6.4 oz)     BP Readings from Last 3 Encounters:   04/30/24 138/82   04/26/24 144/82   04/24/24 144/78       Lab Results   Component Value Date    WBC 5.71 03/15/2024    HGB 12.2 03/15/2024    HCT 38.8 03/15/2024     03/15/2024    CHOL 238 07/07/2015    TRIG 69 10/30/2020    HDL 76 10/30/2020    LDLDIRECT 122 (H) 11/20/2017    ALT 13 07/10/2023    AST 21 07/10/2023     02/12/2015    K 3.8 03/15/2024     03/15/2024    CREATININE 0.70 03/15/2024    BUN 15 03/15/2024    CO2 27 03/15/2024    INR 1.05 03/12/2024    GLUF 130 (H) 05/17/2021       Physical Exam  Vitals and nursing note reviewed.   Constitutional:       Appearance: Normal appearance. She is well-developed.   HENT:      Head: Normocephalic and " atraumatic.   Eyes:      Pupils: Pupils are equal, round, and reactive to light.   Cardiovascular:      Rate and Rhythm: Normal rate and regular rhythm.      Heart sounds: Murmur heard.   Pulmonary:      Effort: Pulmonary effort is normal.      Breath sounds: Normal breath sounds.   Musculoskeletal:      Cervical back: Normal range of motion and neck supple.   Skin:     General: Skin is warm.      Capillary Refill: Capillary refill takes less than 2 seconds.   Neurological:      Mental Status: She is alert and oriented to person, place, and time.      Cranial Nerves: No cranial nerve deficit.   Psychiatric:         Mood and Affect: Mood normal.         Thought Content: Thought content normal.                       Assessment/Plan:   Prudence was seen today for follow-up.    Diagnoses and all orders for this visit:    Fall, subsequent encounter    Vitamin B 12 deficiency    Ambulatory dysfunction    Chronic diastolic (congestive) heart failure (HCC)      Return in about 2 months (around 6/30/2024) for Recheck.  There are no Patient Instructions on file for this visit.

## 2024-07-08 ENCOUNTER — OFFICE VISIT (OUTPATIENT)
Dept: CARDIOLOGY CLINIC | Facility: CLINIC | Age: 89
End: 2024-07-08
Payer: MEDICARE

## 2024-07-08 VITALS
HEART RATE: 96 BPM | OXYGEN SATURATION: 96 % | DIASTOLIC BLOOD PRESSURE: 84 MMHG | HEIGHT: 63 IN | BODY MASS INDEX: 21.19 KG/M2 | SYSTOLIC BLOOD PRESSURE: 150 MMHG

## 2024-07-08 DIAGNOSIS — I50.32 CHRONIC DIASTOLIC (CONGESTIVE) HEART FAILURE (HCC): Primary | ICD-10-CM

## 2024-07-08 DIAGNOSIS — E78.2 MIXED HYPERLIPIDEMIA: ICD-10-CM

## 2024-07-08 DIAGNOSIS — I48.21 PERMANENT ATRIAL FIBRILLATION (HCC): ICD-10-CM

## 2024-07-08 DIAGNOSIS — I10 ESSENTIAL HYPERTENSION: ICD-10-CM

## 2024-07-08 PROCEDURE — 99214 OFFICE O/P EST MOD 30 MIN: CPT | Performed by: INTERNAL MEDICINE

## 2024-07-08 RX ORDER — TRIAMCINOLONE ACETONIDE 0.1 %
PASTE (GRAM) DENTAL
COMMUNITY
Start: 2024-06-25

## 2024-07-08 NOTE — PROGRESS NOTES
Cardiology Office Note  MD Riley Waller MD Jason Kaplan, DO, [facility]  MD Tierney Garcia DO, [facility]  Tyler Miguel DO, [facility]  ----------------------------------------------------------------  [address] [address]    Mable Gallagher 89 y.o. female MRN: [id number]  Unit/Bed#:  Encounter: [id number]      History of Present Illness:  It was a pleasure to see Mable Gallagher in the office today for follow-up CV evaluation.  She has a past medical history of chronic diastolic heart failure, atrial fibrillation, hypertension, dyslipidemia, PVCs and GERD.  She established care with me in September 2022. She previously followed with Dr. Leary.  Patient was chronically followed by Dr. Leary due to her diastolic heart failure and atrial fibrillation.  The patient was managed on Eliquis twice daily as well as bisoprolol.  In the past, the patient had been taking her bisoprolol when her systolic pressure exceeded 150 mm Hg on a daily basis.  She would take it only intermittently during those times.  She underwent echocardiogram in September 2022.  Echocardiogram demonstrated normal left ventricular systolic function with mild right ventricular dilatation and moderate mitral and tricuspid regurgitation with mild pulmonary hypertension.  She is here today for routine CV follow-up.  Overall, she has been feeling well aside from some lower extremity swelling.  She denies any chest pain, pressure, tightness or squeezing.  Denies lightheadedness, dizziness or palpitations.    Review of Systems:  Review of Systems   Constitutional: Negative for decreased appetite, fever, weight gain and weight loss.   HENT:  Negative for congestion and sore throat.    Eyes:  Negative for visual disturbance.   Cardiovascular:  Positive for leg swelling. Negative for chest pain, dyspnea on exertion, near-syncope and palpitations.   Respiratory:  Negative for cough  and shortness of breath.    Hematologic/Lymphatic: Negative for bleeding problem.   Skin:  Negative for rash.   Musculoskeletal:  Negative for myalgias and neck pain.   Gastrointestinal:  Negative for abdominal pain and nausea.   Neurological:  Negative for light-headedness and weakness.   Psychiatric/Behavioral:  Negative for depression.        Past Medical History:   Diagnosis Date    A-fib (HCC)     Fracture of ankle     Hyperlipidemia     Hypertension     Macular degeneration     Osteoarthritis     Vitamin D deficiency        Past Surgical History:   Procedure Laterality Date    APPENDECTOMY      PERINEAL  PELVIC RECONSTRUCTION SURGERY         Social History     Socioeconomic History    Marital status:      Spouse name: Not on file    Number of children: Not on file    Years of education: Not on file    Highest education level: Not on file   Occupational History    Not on file   Tobacco Use    Smoking status: Never    Smokeless tobacco: Never   Vaping Use    Vaping status: Never Used   Substance and Sexual Activity    Alcohol use: No     Comment: Social drinker per [employer]    Drug use: No    Sexual activity: Not Currently   Other Topics Concern    Not on file   Social History Narrative    Not on file     Social Determinants of Health     Financial Resource Strain: Low Risk  (10/31/2023)    Overall Financial Resource Strain (CARDIA)     Difficulty of Paying Living Expenses: Not hard at all   Food Insecurity: No Food Insecurity (3/16/2024)    Hunger Vital Sign     Worried About Running Out of Food in the Last Year: Never true     Ran Out of Food in the Last Year: Never true   Transportation Needs: No Transportation Needs (3/16/2024)    PRAPARE - Transportation     Lack of Transportation (Medical): No     Lack of Transportation (Non-Medical): No   Physical Activity: Not on file   Stress: Not on file   Social Connections: Not on file   Intimate Partner Violence: Not on file   Housing Stability: Low Risk   "(3/16/2024)    Housing Stability Vital Sign     Unable to Pay for Housing in the Last Year: No     Number of Times Moved in the Last Year: 1     Homeless in the Last Year: No       Family History   Problem Relation Age of Onset    Supraventricular tachycardia Family        No Known Allergies      Current Outpatient Medications:     bisoprolol (ZEBETA) 5 mg tablet, Take 0.5 tablets (2.5 mg total) by mouth see administration instructions One half to one tablet daily as directed by physician, Disp: 90 tablet, Rfl: 2    digoxin (LANOXIN) 0.125 mg tablet, Take 1 tablet (125 mcg total) by mouth daily, Disp: 90 tablet, Rfl: 3    Eliquis 2.5 MG, TAKE 1 TABLET TWICE A DAY, Disp: 180 tablet, Rfl: 3    Multiple Vitamins-Minerals (PRESERVISION AREDS 2 PO), , Disp: , Rfl:     pravastatin (PRAVACHOL) 20 mg tablet, Take 1 tablet (20 mg total) by mouth daily at bedtime, Disp: 90 tablet, Rfl: 3    triamcinolone (KENALOG) 0.1 % oral topical paste, apply to affected area four times a day, Disp: , Rfl:     cyanocobalamin (VITAMIN B-12) 1000 MCG tablet, Take 1 tablet (1,000 mcg total) by mouth daily (Patient not taking: Reported on 3/24/2024), Disp: , Rfl:     lidocaine (LIDODERM) 5 %, Apply 1 patch topically over 12 hours daily Remove & Discard patch within 12 hours or as directed by MD (Patient not taking: Reported on 3/24/2024), Disp: , Rfl:     melatonin 3 mg, Take 1 tablet (3 mg total) by mouth daily at bedtime (Patient not taking: Reported on 3/24/2024), Disp: , Rfl:     menthol-methyl salicylate (BENGAY) 10-15 % cream, Apply topically 3 (three) times a day (Patient not taking: Reported on 3/24/2024), Disp: , Rfl:     methocarbamol (ROBAXIN) 500 mg tablet, Take 1 tablet (500 mg total) by mouth daily at bedtime for 4 days (Patient not taking: Reported on 7/8/2024), Disp: , Rfl:     Vitals:    07/08/24 1313   BP: 150/84   BP Location: Left arm   Patient Position: Sitting   Cuff Size: Standard   Pulse: 96   SpO2: 96%   Height: 5' 3\" " (1.6 m)       Body mass index is 21.19 kg/m².    PHYSICAL EXAMINATION:  Gen: Awake, Alert, NAD   Head/eyes: AT/NC, pupils equal and round, Anicteric  ENT: mmm  Neck: Supple, No elevated JVP, trachea midline  Resp: CTA bilaterally no w/r/r  CV: irreg irreg +S1, S2, No m/r/g  Abd: Soft, NT/ND + BS  Ext: +1 pitting LLE edema   Neuro: Follows commands, moves all extermities  Psych: Appropriate affect, normal mood, pleasant attitude, non-combative  Skin: warm; no rash, erythema or venous stasis changes on exposed skin    --------------------------------------------------------------------------------  TREADMILL STRESS  No results found for this or any previous visit.     --------------------------------------------------------------------------------  NUCLEAR STRESS TEST: No results found for this or any previous visit.    No results found for this or any previous visit.      --------------------------------------------------------------------------------  CATH:  No results found for this or any previous visit.    --------------------------------------------------------------------------------  ECHO:   No results found for this or any previous visit.    No results found for this or any previous visit.    --------------------------------------------------------------------------------  HOLTER  No results found for this or any previous visit.    No results found for this or any previous visit.    --------------------------------------------------------------------------------  CAROTIDS  No results found for this or any previous visit.     --------------------------------------------------------------------------------  ECGs:  No results found for this visit on 07/08/24.     Lab Results   Component Value Date    WBC 5.71 03/15/2024    HGB 12.2 03/15/2024    HCT 38.8 03/15/2024    MCV 95 03/15/2024     03/15/2024      Lab Results   Component Value Date    SODIUM 141 03/15/2024    K 3.8 03/15/2024     03/15/2024  "   CO2 27 03/15/2024    BUN 15 03/15/2024    CREATININE 0.70 03/15/2024    GLUC 86 03/15/2024    CALCIUM 8.6 03/15/2024      No results found for: \"HGBA1C\"   Lab Results   Component Value Date    CHOL 238 07/07/2015    CHOL 208 01/14/2014     Lab Results   Component Value Date    HDL 76 10/30/2020    HDL 59 07/20/2018    HDL 76 (H) 03/08/2017     Lab Results   Component Value Date    LDLCALC 102 (H) 10/30/2020    LDLCALC 99 07/20/2018    LDLCALC 153 (H) 03/08/2017     Lab Results   Component Value Date    TRIG 69 10/30/2020    TRIG 83 07/20/2018    TRIG 104 03/08/2017     No results found for: \"CHOLHDL\"   Lab Results   Component Value Date    INR 1.05 03/12/2024    INR 1.11 07/10/2023    INR 1.16 12/15/2022    PROTIME 13.9 03/12/2024    PROTIME 14.3 07/10/2023    PROTIME 14.8 (H) 12/15/2022        1. Chronic diastolic (congestive) heart failure (HCC)  2. Permanent atrial fibrillation (HCC)  3. Essential hypertension  4. Mixed hyperlipidemia        IMPRESSION:  Chronic diastolic heart failure  LVEF 55%, mild LVH, indeterminate diastolic function, mild RV dilatation with normal function, mild LA and moderate to severe RA dilatation, moderate MAC, mild to moderate MR, moderate TR with PASP 41 mmHg, September 2022  Permanent atrial fibrillation on Eliquis  Hypertension  Dyslipidemia  PVCs  GERD    PLAN:  It was a pleasure to see Prudence in the office today for follow-up CV evaluation.  She is here today for routine CV follow-up.  Since her last encounter, she has been feeling well overall.  She has no chest pain concerning for angina and no signs or symptoms of heart failure.  Clinically she examines to be euvolemic.  Blood pressure and heart rate are currently stable.  She is tolerating her current medications without any reported adverse effects.  She can perform physical activity without significant exertional symptoms.  Based on her clinical presentation, the following recommendations:    1.  Recommend heart " "healthy diet low in sodium and carbohydrate.  We have discussed a salt restriction of less than 1500 mg/day  2.  Would encourage physical activity as tolerated to build cardiovascular endurance.  3.  Eliquis for thromboembolic prophylaxis.  Denies any bleeding issues.  4.  Continue bisoprolol and digoxin for rate control.  5.  No changes to her medications at this time  6.  We discussed reevaluation of her mitral and tricuspid regurgitation.  Given the patient's age, she would not want to pursue any treatment for progression of disease anyway.  For this reason, we will hold off on further echocardiograms at this time.  7.  We will follow-up with her in 1 year to reassess her progress.    As always, please do not hesitate to call with any questions.      Portions of the record may have been created with voice recognition software. Occasional wrong word or \"sound a like\" substitutions may have occurred due to the inherent limitations of voice recognition software. Read the chart carefully and recognize, using context, where substitutions have occurred.      Signed: Brandin Walter DO, FACC, FASDOROTHY, FACP  "

## 2024-08-07 ENCOUNTER — TELEPHONE (OUTPATIENT)
Dept: ADMINISTRATIVE | Facility: OTHER | Age: 89
End: 2024-08-07

## 2024-08-07 NOTE — TELEPHONE ENCOUNTER
08/07/24 10:10 AM    Patient contacted to bring Advance Directive, POLST, or Living Will document to next scheduled pcp visit.[facility] Department spoke with patient/ caregiver.    Thank you.  Giuliana Mendez MA  [facility]

## 2024-08-15 ENCOUNTER — RA CDI HCC (OUTPATIENT)
Dept: OTHER | Facility: HOSPITAL | Age: 89
End: 2024-08-15

## 2024-09-24 ENCOUNTER — OFFICE VISIT (OUTPATIENT)
Dept: FAMILY MEDICINE CLINIC | Facility: CLINIC | Age: 89
End: 2024-09-24
Payer: MEDICARE

## 2024-09-24 VITALS
BODY MASS INDEX: 19.84 KG/M2 | WEIGHT: 112 LBS | SYSTOLIC BLOOD PRESSURE: 162 MMHG | TEMPERATURE: 97.6 F | HEIGHT: 63 IN | OXYGEN SATURATION: 97 % | DIASTOLIC BLOOD PRESSURE: 84 MMHG | RESPIRATION RATE: 20 BRPM | HEART RATE: 78 BPM

## 2024-09-24 DIAGNOSIS — M79.642 PAIN IN BOTH HANDS: ICD-10-CM

## 2024-09-24 DIAGNOSIS — I50.32 CHRONIC DIASTOLIC (CONGESTIVE) HEART FAILURE (HCC): ICD-10-CM

## 2024-09-24 DIAGNOSIS — K59.00 CONSTIPATION, UNSPECIFIED CONSTIPATION TYPE: ICD-10-CM

## 2024-09-24 DIAGNOSIS — Z86.73 HISTORY OF CVA (CEREBROVASCULAR ACCIDENT): ICD-10-CM

## 2024-09-24 DIAGNOSIS — R26.2 AMBULATORY DYSFUNCTION: Primary | ICD-10-CM

## 2024-09-24 DIAGNOSIS — M79.641 PAIN IN BOTH HANDS: ICD-10-CM

## 2024-09-24 DIAGNOSIS — I10 PRIMARY HYPERTENSION: ICD-10-CM

## 2024-09-24 PROCEDURE — G2211 COMPLEX E/M VISIT ADD ON: HCPCS | Performed by: FAMILY MEDICINE

## 2024-09-24 PROCEDURE — 99214 OFFICE O/P EST MOD 30 MIN: CPT | Performed by: FAMILY MEDICINE

## 2024-09-24 NOTE — PROGRESS NOTES
Subjective:    ERIK Akins is a 89 y.o. female here today for:  Chief Complaint   Patient presents with    Follow-up   .      ---Above per clinical staff & reviewed. ---  HPI:  89-year-old female here for follow-up.  Patient doing okay.  Patient mostly stable.    Medications reviewed pain, patient taking them without problems  Saw cardiology over the summer.  Patient is having an issue with constipation.  She states she has bowel movements about every other day but she is does seem to strain.  Patient does take Colace.  I did discuss increased water intake with patient.  I also discussed adding MiraLAX to soften her stools.  Patient also having hand pain.  Patient does have a history of carpal tunnel.  Patient states pain is a burning pain and mostly at night.  I did discuss that this sounded more like a neuropathy.  I discussed gabapentin but daughter states that she is alone at night.  I did voice concern over if she would wake up and be dizzy from the medication.  Patient will get carpal tunnel braces and try this instead.  Will see patient back for a physical in December.      The following portions of the patient's history were reviewed and updated as appropriate: allergies, current medications, past family history, past medical history, past social history, past surgical history and problem list.    Past Medical History:   Diagnosis Date    A-fib (HCC)     Fracture of ankle     Hyperlipidemia     Hypertension     Macular degeneration     Osteoarthritis     Vitamin D deficiency        Past Surgical History:   Procedure Laterality Date    APPENDECTOMY      PERINEAL  PELVIC RECONSTRUCTION SURGERY         Social History     Socioeconomic History    Marital status:      Spouse name: None    Number of children: None    Years of education: None    Highest education level: None   Occupational History    None   Tobacco Use    Smoking status: Never    Smokeless tobacco: Never   Vaping Use    Vaping status:  Never Used   Substance and Sexual Activity    Alcohol use: No     Comment: Social drinker per [employer]    Drug use: No    Sexual activity: Not Currently   Other Topics Concern    None   Social History Narrative    None     Social Determinants of Health     Financial Resource Strain: Low Risk  (10/31/2023)    Overall Financial Resource Strain (CARDIA)     Difficulty of Paying Living Expenses: Not hard at all   Food Insecurity: No Food Insecurity (3/16/2024)    Hunger Vital Sign     Worried About Running Out of Food in the Last Year: Never true     Ran Out of Food in the Last Year: Never true   Transportation Needs: No Transportation Needs (3/16/2024)    PRAPARE - Transportation     Lack of Transportation (Medical): No     Lack of Transportation (Non-Medical): No   Physical Activity: Not on file   Stress: Not on file   Social Connections: Not on file   Intimate Partner Violence: Not on file   Housing Stability: Low Risk  (3/16/2024)    Housing Stability Vital Sign     Unable to Pay for Housing in the Last Year: No     Number of Times Moved in the Last Year: 1     Homeless in the Last Year: No       Current Outpatient Medications   Medication Sig Dispense Refill    bisoprolol (ZEBETA) 5 mg tablet Take 0.5 tablets (2.5 mg total) by mouth see administration instructions One half to one tablet daily as directed by physician 90 tablet 2    digoxin (LANOXIN) 0.125 mg tablet Take 1 tablet (125 mcg total) by mouth daily 90 tablet 3    Eliquis 2.5 MG TAKE 1 TABLET TWICE A  tablet 3    lidocaine (LIDODERM) 5 % Apply 1 patch topically over 12 hours daily Remove & Discard patch within 12 hours or as directed by MD      melatonin 3 mg Take 1 tablet (3 mg total) by mouth daily at bedtime      Multiple Vitamins-Minerals (PRESERVISION AREDS 2 PO)       pravastatin (PRAVACHOL) 20 mg tablet Take 1 tablet (20 mg total) by mouth daily at bedtime 90 tablet 3    triamcinolone (KENALOG) 0.1 % oral topical paste apply to affected  "area four times a day      cyanocobalamin (VITAMIN B-12) 1000 MCG tablet Take 1 tablet (1,000 mcg total) by mouth daily (Patient not taking: Reported on 3/24/2024)      menthol-methyl salicylate (BENGAY) 10-15 % cream Apply topically 3 (three) times a day (Patient not taking: Reported on 3/24/2024)      methocarbamol (ROBAXIN) 500 mg tablet Take 1 tablet (500 mg total) by mouth daily at bedtime for 4 days (Patient not taking: Reported on 7/8/2024)       No current facility-administered medications for this visit.        Review of Systems   Constitutional: Negative.  Negative for chills and fever.   HENT: Negative.  Negative for ear pain and sore throat.    Eyes:  Negative for pain and visual disturbance.   Respiratory: Negative.  Negative for cough and shortness of breath.    Cardiovascular: Negative.  Negative for chest pain and palpitations.   Gastrointestinal: Negative.  Negative for abdominal pain and vomiting.   Genitourinary: Negative.  Negative for dysuria and hematuria.   Musculoskeletal:  Positive for arthralgias and gait problem. Negative for back pain.   Skin:  Negative for color change and rash.   Neurological:  Negative for seizures and syncope.   Psychiatric/Behavioral: Negative.     All other systems reviewed and are negative.       Objective:    /84 (BP Location: Left arm, Patient Position: Sitting, Cuff Size: Standard)   Pulse 78   Temp 97.6 °F (36.4 °C) (Temporal)   Resp 20   Ht 5' 3\" (1.6 m)   Wt 50.8 kg (112 lb)   SpO2 97%   BMI 19.84 kg/m²   Wt Readings from Last 3 Encounters:   09/24/24 50.8 kg (112 lb)   04/30/24 54.3 kg (119 lb 9.6 oz)   03/12/24 57 kg (125 lb 10.6 oz)     BP Readings from Last 3 Encounters:   09/24/24 162/84   07/08/24 150/84   04/30/24 138/82       Lab Results   Component Value Date    WBC 5.71 03/15/2024    HGB 12.2 03/15/2024    HCT 38.8 03/15/2024     03/15/2024    CHOL 238 07/07/2015    TRIG 69 10/30/2020    HDL 76 10/30/2020    LDLDIRECT 122 (H) " 11/20/2017    ALT 13 07/10/2023    AST 21 07/10/2023     02/12/2015    K 3.8 03/15/2024     03/15/2024    CREATININE 0.70 03/15/2024    BUN 15 03/15/2024    CO2 27 03/15/2024    INR 1.05 03/12/2024    GLUF 130 (H) 05/17/2021       Physical Exam  Vitals and nursing note reviewed.   Constitutional:       Appearance: Normal appearance. She is well-developed.   HENT:      Head: Normocephalic and atraumatic.   Eyes:      Pupils: Pupils are equal, round, and reactive to light.   Cardiovascular:      Rate and Rhythm: Normal rate and regular rhythm.      Pulses: Normal pulses.      Heart sounds: Murmur heard.   Pulmonary:      Effort: Pulmonary effort is normal.      Breath sounds: Normal breath sounds.   Musculoskeletal:      Cervical back: Normal range of motion and neck supple.   Skin:     General: Skin is warm.      Capillary Refill: Capillary refill takes less than 2 seconds.   Neurological:      General: No focal deficit present.      Mental Status: She is alert and oriented to person, place, and time.      Cranial Nerves: No cranial nerve deficit.   Psychiatric:         Mood and Affect: Mood normal.         Thought Content: Thought content normal.                 Falls Plan of Care: not completed because patient not ambulatory, bed ridden, immobile, confined to chair, or wheelchair bound.            Assessment/Plan:   Prudence was seen today for follow-up.    Diagnoses and all orders for this visit:    Ambulatory dysfunction    Chronic diastolic (congestive) heart failure (HCC)    Primary hypertension    History of CVA (cerebrovascular accident)    Constipation, unspecified constipation type    Pain in both hands      Return in about 2 months (around 12/9/2024) for Annual physical.  There are no Patient Instructions on file for this visit.

## 2025-01-28 DIAGNOSIS — E78.2 MIXED HYPERLIPIDEMIA: ICD-10-CM

## 2025-01-28 DIAGNOSIS — I48.91 ATRIAL FIBRILLATION, UNSPECIFIED TYPE (HCC): ICD-10-CM

## 2025-01-28 RX ORDER — PRAVASTATIN SODIUM 20 MG
20 TABLET ORAL
Qty: 90 TABLET | Refills: 0 | Status: SHIPPED | OUTPATIENT
Start: 2025-01-28

## 2025-01-29 RX ORDER — DIGOXIN 125 MCG
125 TABLET ORAL DAILY
Qty: 30 TABLET | Refills: 0 | Status: SHIPPED | OUTPATIENT
Start: 2025-01-29

## 2025-03-04 ENCOUNTER — OFFICE VISIT (OUTPATIENT)
Dept: FAMILY MEDICINE CLINIC | Facility: CLINIC | Age: OVER 89
End: 2025-03-04
Payer: MEDICARE

## 2025-03-04 VITALS
HEART RATE: 86 BPM | DIASTOLIC BLOOD PRESSURE: 82 MMHG | SYSTOLIC BLOOD PRESSURE: 176 MMHG | TEMPERATURE: 97 F | BODY MASS INDEX: 19.84 KG/M2 | OXYGEN SATURATION: 97 % | HEIGHT: 63 IN

## 2025-03-04 DIAGNOSIS — Z00.00 MEDICARE ANNUAL WELLNESS VISIT, SUBSEQUENT: Primary | ICD-10-CM

## 2025-03-04 DIAGNOSIS — I48.91 ATRIAL FIBRILLATION, UNSPECIFIED TYPE (HCC): ICD-10-CM

## 2025-03-04 DIAGNOSIS — I10 PRIMARY HYPERTENSION: ICD-10-CM

## 2025-03-04 DIAGNOSIS — Z13.6 SCREENING FOR CARDIOVASCULAR CONDITION: ICD-10-CM

## 2025-03-04 DIAGNOSIS — Z86.73 HISTORY OF CVA (CEREBROVASCULAR ACCIDENT): ICD-10-CM

## 2025-03-04 DIAGNOSIS — H35.30 MACULAR DEGENERATION, UNSPECIFIED LATERALITY, UNSPECIFIED TYPE: ICD-10-CM

## 2025-03-04 DIAGNOSIS — E78.2 MIXED HYPERLIPIDEMIA: ICD-10-CM

## 2025-03-04 DIAGNOSIS — R26.2 AMBULATORY DYSFUNCTION: ICD-10-CM

## 2025-03-04 PROCEDURE — G0439 PPPS, SUBSEQ VISIT: HCPCS | Performed by: FAMILY MEDICINE

## 2025-03-04 PROCEDURE — G2211 COMPLEX E/M VISIT ADD ON: HCPCS | Performed by: FAMILY MEDICINE

## 2025-03-04 PROCEDURE — 99214 OFFICE O/P EST MOD 30 MIN: CPT | Performed by: FAMILY MEDICINE

## 2025-03-04 NOTE — PROGRESS NOTES
Name: Mable Gallagher      : 1930      MRN: [id number]  Encounter Provider: Catie Boyle MD  Encounter Date: 3/4/2025   Encounter department: [facility]    Assessment & Plan  Medicare annual wellness visit, subsequent  Health maintenance reviewed  Labs ordered  Orders:    CBC and differential; Future    Comprehensive metabolic panel; Future    TSH, 3rd generation with Free T4 reflex; Future    Lipid panel; Future    Atrial fibrillation, unspecified type (HCC)  stable  Orders:    CBC and differential; Future    Comprehensive metabolic panel; Future    TSH, 3rd generation with Free T4 reflex; Future    Lipid panel; Future    Ambulatory dysfunction  In wheelchair, has help at her home during the day  Orders:    CBC and differential; Future    Comprehensive metabolic panel; Future    TSH, 3rd generation with Free T4 reflex; Future    Lipid panel; Future    Macular degeneration, unspecified laterality, unspecified type  Legally blind  Orders:    CBC and differential; Future    Comprehensive metabolic panel; Future    TSH, 3rd generation with Free T4 reflex; Future    Lipid panel; Future    Primary hypertension  Elevated today, lower at home  Will send in readings  Orders:    CBC and differential; Future    Comprehensive metabolic panel; Future    TSH, 3rd generation with Free T4 reflex; Future    Lipid panel; Future    Mixed hyperlipidemia  Stable, on statin  Orders:    CBC and differential; Future    Comprehensive metabolic panel; Future    TSH, 3rd generation with Free T4 reflex; Future    Lipid panel; Future    History of CVA (cerebrovascular accident)    Orders:    CBC and differential; Future    Comprehensive metabolic panel; Future    TSH, 3rd generation with Free T4 reflex; Future    Lipid panel; Future    Screening for cardiovascular condition    Orders:    Lipid panel; Future      Depression Screening and Follow-up Plan: Patient was screened for depression during today's encounter.  They screened negative with a PHQ-2 score of 0.        Preventive health issues were discussed with patient, and age appropriate screening tests were ordered as noted in patient's After Visit Summary. Personalized health advice and appropriate referrals for health education or preventive services given if needed, as noted in patient's After Visit Summary.    History of Present Illness     HPI   89yof here for well exam'  Questions reviewed  Eyesight poor  Followed by cardiology once yearly, stable on digoxin and eliquis  On statin also  BP elevated in office, states usually normal at home  Will do reading and let me know  Questions reviewed  Concerned about speech and stroke, daughter states it does sometimes seem worse  Seems stable to me today, reassured she is on the eliquis and discussed reasons to go to ER if concerned  In wheelchair most of day- discussed concern for bed sores- has caregiver who checks her and declines check today  Health maintenacnce reviewed    PHQ-2/9 Depression Screening    Little interest or pleasure in doing things: 0 - not at all  Feeling down, depressed, or hopeless: 0 - not at all  PHQ-2 Score: 0  PHQ-2 Interpretation: Negative depression screen         Patient Care Team:  Catie Boyle MD as PCP - General (Family Medicine)  MD Jamal Santo MD    Review of Systems   Constitutional: Negative.  Negative for chills and fever.   HENT: Negative.  Negative for ear pain and sore throat.    Eyes:  Negative for pain and visual disturbance.   Respiratory: Negative.  Negative for cough and shortness of breath.    Cardiovascular: Negative.  Negative for chest pain and palpitations.   Gastrointestinal: Negative.  Negative for abdominal pain and vomiting.   Genitourinary: Negative.  Negative for dysuria and hematuria.   Musculoskeletal:  Negative for arthralgias and back pain.   Skin:  Negative for color change and rash.   Neurological: Negative.  Negative for seizures and  syncope.   Psychiatric/Behavioral: Negative.     All other systems reviewed and are negative.    Medical History Reviewed by provider this encounter:  Tobacco  Allergies  Meds  Problems  Med Hx  Surg Hx  Fam Hx       Annual Wellness Visit Questionnaire   Prudence is here for her Subsequent Wellness visit.     Health Risk Assessment:   Patient rates overall health as poor. Patient feels that their physical health rating is slightly worse. Patient is satisfied with their life. Eyesight was rated as slightly worse. Hearing was rated as slightly worse. Patient feels that their emotional and mental health rating is same. Patients states they are never, rarely angry. Patient states they are always unusually tired/fatigued. Pain experienced in the last 7 days has been none. Patient states that she has experienced no weight loss or gain in last 6 months.     Depression Screening:   PHQ-2 Score: 0      Fall Risk Screening:   In the past year, patient has experienced: no history of falling in past year      Urinary Incontinence Screening:   Patient has not leaked urine accidently in the last six months.     Home Safety:  Patient does not have trouble with stairs inside or outside of their home. Patient has working smoke alarms and has working carbon monoxide detector. Home safety hazards include: none.     Nutrition:   Current diet is Regular.     Medications:   Patient is not currently taking any over-the-counter supplements. Patient is able to manage medications.     Activities of Daily Living (ADLs)/Instrumental Activities of Daily Living (IADLs):   Walk and transfer into and out of bed and chair?: Yes  Dress and groom yourself?: Yes    Bathe or shower yourself?: No    Feed yourself? Yes  Do your laundry/housekeeping?: No  Manage your money, pay your bills and track your expenses?: No  Make your own meals?: No    Do your own shopping?: No    PREVENTIVE SCREENINGS      Cardiovascular Screening:    General: Screening  "Not Indicated and History Lipid Disorder      Diabetes Screening:     General: Screening Current      Colorectal Cancer Screening:     General: Screening Not Indicated      Cervical Cancer Screening:    General: Screening Not Indicated      Lung Cancer Screening:     General: Screening Not Indicated    Screening, Brief Intervention, and Referral to Treatment (SBIRT)     Screening  Typical number of drinks in a day: 0  Typical number of drinks in a week: 0  Interpretation: Low risk drinking behavior.    Single Item Drug Screening:  How often have you used an illegal drug (including marijuana) or a prescription medication for non-medical reasons in the past year? never    Single Item Drug Screen Score: 0  Interpretation: Negative screen for possible drug use disorder    Social Drivers of Health     Financial Resource Strain: Low Risk  (10/31/2023)    Overall Financial Resource Strain (CARDIA)     Difficulty of Paying Living Expenses: Not hard at all   Food Insecurity: No Food Insecurity (3/4/2025)    Hunger Vital Sign     Worried About Running Out of Food in the Last Year: Never true     Ran Out of Food in the Last Year: Never true   Transportation Needs: No Transportation Needs (3/4/2025)    PRAPARE - Transportation     Lack of Transportation (Medical): No     Lack of Transportation (Non-Medical): No   Housing Stability: Low Risk  (3/4/2025)    Housing Stability Vital Sign     Unable to Pay for Housing in the Last Year: No     Number of Times Moved in the Last Year: 1     Homeless in the Last Year: No   Utilities: Not At Risk (3/4/2025)    [facility] Utilities     Threatened with loss of utilities: No     No results found.    Objective   BP (!) 176/82 (BP Location: Left arm, Patient Position: Sitting, Cuff Size: Adult)   Pulse 86   Temp (!) 97 °F (36.1 °C) (Temporal)   Ht 5' 3\" (1.6 m)   SpO2 97%   BMI 19.84 kg/m²     Physical Exam  Vitals and nursing note reviewed.   Constitutional:       Appearance: Normal " appearance.   HENT:      Head: Normocephalic.      Right Ear: Tympanic membrane normal.      Left Ear: Tympanic membrane normal.      Nose: Nose normal.      Mouth/Throat:      Mouth: Mucous membranes are moist.   Eyes:      Extraocular Movements: Extraocular movements intact.      Pupils: Pupils are equal, round, and reactive to light.   Cardiovascular:      Rate and Rhythm: Normal rate and regular rhythm.      Pulses: Normal pulses.   Pulmonary:      Effort: Pulmonary effort is normal. No respiratory distress.      Breath sounds: No wheezing.   Abdominal:      General: Abdomen is flat. There is no distension.      Tenderness: There is no abdominal tenderness.   Musculoskeletal:      Cervical back: Normal range of motion and neck supple.   Neurological:      General: No focal deficit present.      Mental Status: She is alert and oriented to person, place, and time.   Psychiatric:         Mood and Affect: Mood normal.         Behavior: Behavior normal.

## 2025-03-04 NOTE — PATIENT INSTRUCTIONS
Medicare Preventive Visit Patient Instructions  Thank you for completing your Welcome to Medicare Visit or Medicare Annual Wellness Visit today. Your next wellness visit will be due in one year (3/5/2026).  The screening/preventive services that you may require over the next 5-10 years are detailed below. Some tests may not apply to you based off risk factors and/or age. Screening tests ordered at today's visit but not completed yet may show as past due. Also, please note that scanned in results may not display below.  Preventive Screenings:  Service Recommendations Previous Testing/Comments   Colorectal Cancer Screening  * Colonoscopy    * Fecal Occult Blood Test (FOBT)/Fecal Immunochemical Test (FIT)  * Fecal DNA/Cologuard Test  * Flexible Sigmoidoscopy Age: 45-75 years old   Colonoscopy: every 10 years (may be performed more frequently if at higher risk)  OR  FOBT/FIT: every 1 year  OR  Cologuard: every 3 years  OR  Sigmoidoscopy: every 5 years  Screening may be recommended earlier than age 45 if at higher risk for colorectal cancer. Also, an individualized decision between you and your healthcare provider will decide whether screening between the ages of 76-85 would be appropriate. Colonoscopy: Not on file  FOBT/FIT: Not on file  Cologuard: Not on file  Sigmoidoscopy: Not on file    Screening Not Indicated     Breast Cancer Screening Age: 40+ years old  Frequency: every 1-2 years  Not required if history of left and right mastectomy Mammogram: Not on file        Cervical Cancer Screening Between the ages of 21-29, pap smear recommended once every 3 years.   Between the ages of 30-65, can perform pap smear with HPV co-testing every 5 years.   Recommendations may differ for women with a history of total hysterectomy, cervical cancer, or abnormal pap smears in past. Pap Smear: Not on file    Screening Not Indicated   Hepatitis C Screening Once for adults born between 1945 and 1965  More frequently in patients at  high risk for Hepatitis C Hep C Antibody: Not on file        Diabetes Screening 1-2 times per year if you're at risk for diabetes or have pre-diabetes Fasting glucose: 130 mg/dL (5/17/2021)  A1C: No results in last 5 years (No results in last 5 years)  Screening Current   Cholesterol Screening Once every 5 years if you don't have a lipid disorder. May order more often based on risk factors. Lipid panel: 10/30/2020    Screening Not Indicated  History Lipid Disorder     Other Preventive Screenings Covered by Medicare:  Abdominal Aortic Aneurysm (AAA) Screening: covered once if your at risk. You're considered to be at risk if you have a family history of AAA.  Lung Cancer Screening: covers low dose CT scan once per year if you meet all of the following conditions: (1) Age 55-77; (2) No signs or symptoms of lung cancer; (3) Current smoker or have quit smoking within the last 15 years; (4) You have a tobacco smoking history of at least 20 pack years (packs per day multiplied by number of years you smoked); (5) You get a written order from a healthcare provider.  Glaucoma Screening: covered annually if you're considered high risk: (1) You have diabetes OR (2) Family history of glaucoma OR (3)  aged 50 and older OR (4)  American aged 65 and older  Osteoporosis Screening: covered every 2 years if you meet one of the following conditions: (1) You're estrogen deficient and at risk for osteoporosis based off medical history and other findings; (2) Have a vertebral abnormality; (3) On glucocorticoid therapy for more than 3 months; (4) Have primary hyperparathyroidism; (5) On osteoporosis medications and need to assess response to drug therapy.   Last bone density test (DXA Scan): 09/28/1916.  HIV Screening: covered annually if you're between the age of 15-65. Also covered annually if you are younger than 15 and older than 65 with risk factors for HIV infection. For pregnant patients, it is covered up to  3 times per pregnancy.    Immunizations:  Immunization Recommendations   Influenza Vaccine Annual influenza vaccination during flu season is recommended for all persons aged >= 6 months who do not have contraindications   Pneumococcal Vaccine   * Pneumococcal conjugate vaccine = PCV13 (Prevnar 13), PCV15 (Vaxneuvance), PCV20 (Prevnar 20)  * Pneumococcal polysaccharide vaccine = PPSV23 (Pneumovax) Adults 19-65 yo with certain risk factors or if 65+ yo  If never received any pneumonia vaccine: recommend Prevnar 20 (PCV20)  Give PCV20 if previously received 1 dose of PCV13 or PPSV23   Hepatitis B Vaccine 3 dose series if at intermediate or high risk (ex: diabetes, end stage renal disease, liver disease)   Respiratory syncytial virus (RSV) Vaccine - COVERED BY MEDICARE PART D  * RSVPreF3 (Arexvy) CDC recommends that adults 60 years of age and older may receive a single dose of RSV vaccine using shared clinical decision-making (SCDM)   Tetanus (Td) Vaccine - COST NOT COVERED BY MEDICARE PART B Following completion of primary series, a booster dose should be given every 10 years to maintain immunity against tetanus. Td may also be given as tetanus wound prophylaxis.   Tdap Vaccine - COST NOT COVERED BY MEDICARE PART B Recommended at least once for all adults. For pregnant patients, recommended with each pregnancy.   Shingles Vaccine (Shingrix) - COST NOT COVERED BY MEDICARE PART B  2 shot series recommended in those 19 years and older who have or will have weakened immune systems or those 50 years and older     Health Maintenance Due:  There are no preventive care reminders to display for this patient.  Immunizations Due:      Topic Date Due   • Pneumococcal Vaccine: 65+ Years (2 of 2 - PPSV23) 03/13/2019   • COVID-19 Vaccine (4 - 2024-25 season) 09/01/2024     Advance Directives   What are advance directives?  Advance directives are legal documents that state your wishes and plans for medical care. These plans are made  ahead of time in case you lose your ability to make decisions for yourself. Advance directives can apply to any medical decision, such as the treatments you want, and if you want to donate organs.   What are the types of advance directives?  There are many types of advance directives, and each state has rules about how to use them. You may choose a combination of any of the following:  Living will:  This is a written record of the treatment you want. You can also choose which treatments you do not want, which to limit, and which to stop at a certain time. This includes surgery, medicine, IV fluid, and tube feedings.   Durable power of  for healthcare (DPAHC):  This is a written record that states who you want to make healthcare choices for you when you are unable to make them for yourself. This person, called a proxy, is usually a family member or a friend. You may choose more than 1 proxy.  Do not resuscitate (DNR) order:  A DNR order is used in case your heart stops beating or you stop breathing. It is a request not to have certain forms of treatment, such as CPR. A DNR order may be included in other types of advance directives.  Medical directive:  This covers the care that you want if you are in a coma, near death, or unable to make decisions for yourself. You can list the treatments you want for each condition. Treatment may include pain medicine, surgery, blood transfusions, dialysis, IV or tube feedings, and a ventilator (breathing machine).  Values history:  This document has questions about your views, beliefs, and how you feel and think about life. This information can help others choose the care that you would choose.  Why are advance directives important?  An advance directive helps you control your care. Although spoken wishes may be used, it is better to have your wishes written down. Spoken wishes can be misunderstood, or not followed. Treatments may be given even if you do not want them. An  advance directive may make it easier for your family to make difficult choices about your care.       © Copyright [employer] 2018 Information is for End User's use only and may not be sold, redistributed or otherwise used for commercial purposes. All illustrations and images included in CareNotes® are the copyrighted property of [employer] or [employer]      Medicare Preventive Visit Patient Instructions  Thank you for completing your Welcome to Medicare Visit or Medicare Annual Wellness Visit today. Your next wellness visit will be due in one year (3/5/2026).  The screening/preventive services that you may require over the next 5-10 years are detailed below. Some tests may not apply to you based off risk factors and/or age. Screening tests ordered at today's visit but not completed yet may show as past due. Also, please note that scanned in results may not display below.  Preventive Screenings:  Service Recommendations Previous Testing/Comments   Colorectal Cancer Screening  * Colonoscopy    * Fecal Occult Blood Test (FOBT)/Fecal Immunochemical Test (FIT)  * Fecal DNA/Cologuard Test  * Flexible Sigmoidoscopy Age: 45-75 years old   Colonoscopy: every 10 years (may be performed more frequently if at higher risk)  OR  FOBT/FIT: every 1 year  OR  Cologuard: every 3 years  OR  Sigmoidoscopy: every 5 years  Screening may be recommended earlier than age 45 if at higher risk for colorectal cancer. Also, an individualized decision between you and your healthcare provider will decide whether screening between the ages of 76-85 would be appropriate. Colonoscopy: Not on file  FOBT/FIT: Not on file  Cologuard: Not on file  Sigmoidoscopy: Not on file    Screening Not Indicated     Breast Cancer Screening Age: 40+ years old  Frequency: every 1-2 years  Not required if history of left and right mastectomy Mammogram: Not on file        Cervical Cancer Screening Between the ages of 21-29, pap smear recommended once  every 3 years.   Between the ages of 30-65, can perform pap smear with HPV co-testing every 5 years.   Recommendations may differ for women with a history of total hysterectomy, cervical cancer, or abnormal pap smears in past. Pap Smear: Not on file    Screening Not Indicated   Hepatitis C Screening Once for adults born between 1945 and 1965  More frequently in patients at high risk for Hepatitis C Hep C Antibody: Not on file        Diabetes Screening 1-2 times per year if you're at risk for diabetes or have pre-diabetes Fasting glucose: 130 mg/dL (5/17/2021)  A1C: No results in last 5 years (No results in last 5 years)  Screening Current   Cholesterol Screening Once every 5 years if you don't have a lipid disorder. May order more often based on risk factors. Lipid panel: 10/30/2020    Screening Not Indicated  History Lipid Disorder     Other Preventive Screenings Covered by Medicare:  Abdominal Aortic Aneurysm (AAA) Screening: covered once if your at risk. You're considered to be at risk if you have a family history of AAA.  Lung Cancer Screening: covers low dose CT scan once per year if you meet all of the following conditions: (1) Age 55-77; (2) No signs or symptoms of lung cancer; (3) Current smoker or have quit smoking within the last 15 years; (4) You have a tobacco smoking history of at least 20 pack years (packs per day multiplied by number of years you smoked); (5) You get a written order from a healthcare provider.  Glaucoma Screening: covered annually if you're considered high risk: (1) You have diabetes OR (2) Family history of glaucoma OR (3)  aged 50 and older OR (4)  American aged 65 and older  Osteoporosis Screening: covered every 2 years if you meet one of the following conditions: (1) You're estrogen deficient and at risk for osteoporosis based off medical history and other findings; (2) Have a vertebral abnormality; (3) On glucocorticoid therapy for more than 3 months; (4)  Have primary hyperparathyroidism; (5) On osteoporosis medications and need to assess response to drug therapy.   Last bone density test (DXA Scan): 09/28/1916.  HIV Screening: covered annually if you're between the age of 15-65. Also covered annually if you are younger than 15 and older than 65 with risk factors for HIV infection. For pregnant patients, it is covered up to 3 times per pregnancy.    Immunizations:  Immunization Recommendations   Influenza Vaccine Annual influenza vaccination during flu season is recommended for all persons aged >= 6 months who do not have contraindications   Pneumococcal Vaccine   * Pneumococcal conjugate vaccine = PCV13 (Prevnar 13), PCV15 (Vaxneuvance), PCV20 (Prevnar 20)  * Pneumococcal polysaccharide vaccine = PPSV23 (Pneumovax) Adults 19-65 yo with certain risk factors or if 65+ yo  If never received any pneumonia vaccine: recommend Prevnar 20 (PCV20)  Give PCV20 if previously received 1 dose of PCV13 or PPSV23   Hepatitis B Vaccine 3 dose series if at intermediate or high risk (ex: diabetes, end stage renal disease, liver disease)   Respiratory syncytial virus (RSV) Vaccine - COVERED BY MEDICARE PART D  * RSVPreF3 (Arexvy) CDC recommends that adults 60 years of age and older may receive a single dose of RSV vaccine using shared clinical decision-making (SCDM)   Tetanus (Td) Vaccine - COST NOT COVERED BY MEDICARE PART B Following completion of primary series, a booster dose should be given every 10 years to maintain immunity against tetanus. Td may also be given as tetanus wound prophylaxis.   Tdap Vaccine - COST NOT COVERED BY MEDICARE PART B Recommended at least once for all adults. For pregnant patients, recommended with each pregnancy.   Shingles Vaccine (Shingrix) - COST NOT COVERED BY MEDICARE PART B  2 shot series recommended in those 19 years and older who have or will have weakened immune systems or those 50 years and older     Health Maintenance Due:  There are no  preventive care reminders to display for this patient.  Immunizations Due:      Topic Date Due   • Pneumococcal Vaccine: 65+ Years (2 of 2 - PPSV23) 03/13/2019   • COVID-19 Vaccine (4 - 2024-25 season) 09/01/2024     Advance Directives   What are advance directives?  Advance directives are legal documents that state your wishes and plans for medical care. These plans are made ahead of time in case you lose your ability to make decisions for yourself. Advance directives can apply to any medical decision, such as the treatments you want, and if you want to donate organs.   What are the types of advance directives?  There are many types of advance directives, and each state has rules about how to use them. You may choose a combination of any of the following:  Living will:  This is a written record of the treatment you want. You can also choose which treatments you do not want, which to limit, and which to stop at a certain time. This includes surgery, medicine, IV fluid, and tube feedings.   Durable power of  for healthcare (DPAHC):  This is a written record that states who you want to make healthcare choices for you when you are unable to make them for yourself. This person, called a proxy, is usually a family member or a friend. You may choose more than 1 proxy.  Do not resuscitate (DNR) order:  A DNR order is used in case your heart stops beating or you stop breathing. It is a request not to have certain forms of treatment, such as CPR. A DNR order may be included in other types of advance directives.  Medical directive:  This covers the care that you want if you are in a coma, near death, or unable to make decisions for yourself. You can list the treatments you want for each condition. Treatment may include pain medicine, surgery, blood transfusions, dialysis, IV or tube feedings, and a ventilator (breathing machine).  Values history:  This document has questions about your views, beliefs, and how you feel  and think about life. This information can help others choose the care that you would choose.  Why are advance directives important?  An advance directive helps you control your care. Although spoken wishes may be used, it is better to have your wishes written down. Spoken wishes can be misunderstood, or not followed. Treatments may be given even if you do not want them. An advance directive may make it easier for your family to make difficult choices about your care.       © Copyright [employer] 2018 Information is for End User's use only and may not be sold, redistributed or otherwise used for commercial purposes. All illustrations and images included in CareNotes® are the copyrighted property of [employer] or [employer]

## 2025-03-05 DIAGNOSIS — I48.91 ATRIAL FIBRILLATION, UNSPECIFIED TYPE (HCC): ICD-10-CM

## 2025-03-05 NOTE — TELEPHONE ENCOUNTER
Patient Daughter requesting a short supply     Reason for call:   [x] Refill   [] Prior Auth  [] Other:     Office:   [x] PCP/Provider -   [] Specialty/Provider -     Medication: digoxin (LANOXIN) 0.125 mg tablet    Dose/Frequency:  TAKE 1 TABLET DAILY,     Quantity: 14 tablet    Pharmacy: [id number] - [place] -     Does the patient have enough for 3 days?   [] Yes   [x] No - Send as HP to POD

## 2025-03-06 RX ORDER — DIGOXIN 125 MCG
125 TABLET ORAL DAILY
Qty: 14 TABLET | Refills: 0 | Status: SHIPPED | OUTPATIENT
Start: 2025-03-06

## 2025-03-06 RX ORDER — DIGOXIN 125 MCG
125 TABLET ORAL DAILY
Qty: 90 TABLET | Refills: 0 | Status: SHIPPED | OUTPATIENT
Start: 2025-03-06

## 2025-03-11 NOTE — ASSESSMENT & PLAN NOTE
Legally blind  Orders:    CBC and differential; Future    Comprehensive metabolic panel; Future    TSH, 3rd generation with Free T4 reflex; Future    Lipid panel; Future

## 2025-03-11 NOTE — ASSESSMENT & PLAN NOTE
In wheelchair, has help at her home during the day  Orders:    CBC and differential; Future    Comprehensive metabolic panel; Future    TSH, 3rd generation with Free T4 reflex; Future    Lipid panel; Future

## 2025-03-11 NOTE — ASSESSMENT & PLAN NOTE
Stable, on statin  Orders:    CBC and differential; Future    Comprehensive metabolic panel; Future    TSH, 3rd generation with Free T4 reflex; Future    Lipid panel; Future

## 2025-03-12 DIAGNOSIS — I48.91 ATRIAL FIBRILLATION, UNSPECIFIED TYPE (HCC): ICD-10-CM

## 2025-03-13 RX ORDER — DIGOXIN 125 MCG
125 TABLET ORAL DAILY
Qty: 14 TABLET | Refills: 0 | OUTPATIENT
Start: 2025-03-13

## 2025-04-15 ENCOUNTER — DOCUMENTATION (OUTPATIENT)
Dept: ADMINISTRATIVE | Facility: OTHER | Age: OVER 89
End: 2025-04-15

## 2025-04-15 NOTE — PROGRESS NOTES
04/15/25 3:02 PM    Annual Wellness Visit outreach is not required, an AWV was completed at the PCP office.    Thank you.  Giuliana Mendez MA  [facility]

## 2025-04-23 ENCOUNTER — TELEPHONE (OUTPATIENT)
Dept: CARDIOLOGY CLINIC | Facility: CLINIC | Age: OVER 89
End: 2025-04-23

## 2025-04-23 DIAGNOSIS — I48.0 PAF (PAROXYSMAL ATRIAL FIBRILLATION) (HCC): ICD-10-CM

## 2025-04-23 RX ORDER — APIXABAN 2.5 MG/1
2.5 TABLET, FILM COATED ORAL 2 TIMES DAILY
Qty: 180 TABLET | Refills: 0 | Status: SHIPPED | OUTPATIENT
Start: 2025-04-23

## 2025-05-08 DIAGNOSIS — E78.2 MIXED HYPERLIPIDEMIA: ICD-10-CM

## 2025-05-08 DIAGNOSIS — I48.91 ATRIAL FIBRILLATION, UNSPECIFIED TYPE (HCC): ICD-10-CM

## 2025-05-08 NOTE — TELEPHONE ENCOUNTER
Reason for call:   [x] Refill   [] Prior Auth  [] Other:     Office:   [x] PCP/Provider -   [] Specialty/Provider -     Medication:   - Digoxin 0.125 mg- take 1 tablet by mouth daily      Pharmacy: [facility]     Local Pharmacy   Does the patient have enough for 3 days?   [] Yes   [] No - Send as HP to POD    Mail Away Pharmacy   Does the patient have enough for 10 days?   [x] Yes   [] No - Send as HP to POD

## 2025-05-08 NOTE — TELEPHONE ENCOUNTER
- Patient needs a short term supply    Reason for call:   [x] Refill   [] Prior Auth  [] Other:     Office:   [] PCP/Provider -   [x] Specialty/Provider - Cardio    Medication:   - Pravastatin 20mg- take 1 tablet daily at bedtime      Pharmacy: [facility]/ [facility] [place]    Local Pharmacy   Does the patient have enough for 3 days?   [] Yes   [x] No - Send as HP to POD    Mail Away Pharmacy   Does the patient have enough for 10 days?   [] Yes   [x] No - Send as HP to POD

## 2025-05-09 RX ORDER — PRAVASTATIN SODIUM 20 MG
20 TABLET ORAL
Qty: 90 TABLET | Refills: 0 | OUTPATIENT
Start: 2025-05-09

## 2025-05-09 RX ORDER — PRAVASTATIN SODIUM 20 MG
20 TABLET ORAL
Qty: 14 TABLET | Refills: 0 | Status: SHIPPED | OUTPATIENT
Start: 2025-05-09

## 2025-05-09 NOTE — TELEPHONE ENCOUNTER
Patient called to request a refill for their pravastatin advised a refill was requested on 5/8 and is pending approval. Patient verbalized understanding and is in agreement.     Does the patient have enough for 3 days?   [] Yes   [x] No - Send as HP to POD - she is out of medication, pt's son would really appreciate a call back once it's been sent so he knows when he can go pick it up for her. You can reach him at

## 2025-05-12 RX ORDER — DIGOXIN 125 MCG
125 TABLET ORAL DAILY
Qty: 90 TABLET | Refills: 0 | Status: SHIPPED | OUTPATIENT
Start: 2025-05-12

## 2025-05-16 DIAGNOSIS — E78.2 MIXED HYPERLIPIDEMIA: ICD-10-CM

## 2025-05-16 RX ORDER — PRAVASTATIN SODIUM 20 MG
20 TABLET ORAL
Qty: 14 TABLET | Refills: 0 | OUTPATIENT
Start: 2025-05-16

## 2025-05-22 ENCOUNTER — NURSE TRIAGE (OUTPATIENT)
Age: OVER 89
End: 2025-05-22

## 2025-05-22 ENCOUNTER — TELEPHONE (OUTPATIENT)
Dept: LAB | Facility: HOSPITAL | Age: OVER 89
End: 2025-05-22

## 2025-05-22 DIAGNOSIS — E78.2 MIXED HYPERLIPIDEMIA: ICD-10-CM

## 2025-05-22 DIAGNOSIS — I48.91 ATRIAL FIBRILLATION, UNSPECIFIED TYPE (HCC): ICD-10-CM

## 2025-05-22 DIAGNOSIS — I48.0 PAF (PAROXYSMAL ATRIAL FIBRILLATION) (HCC): ICD-10-CM

## 2025-05-22 RX ORDER — PRAVASTATIN SODIUM 20 MG
20 TABLET ORAL
Qty: 90 TABLET | Refills: 0 | Status: SHIPPED | OUTPATIENT
Start: 2025-05-22 | End: 2025-05-22 | Stop reason: SDUPTHER

## 2025-05-22 RX ORDER — PRAVASTATIN SODIUM 20 MG
20 TABLET ORAL
Qty: 14 TABLET | Refills: 1 | Status: SHIPPED | OUTPATIENT
Start: 2025-05-22

## 2025-05-22 RX ORDER — DIGOXIN 125 MCG
125 TABLET ORAL DAILY
Qty: 90 TABLET | Refills: 0 | OUTPATIENT
Start: 2025-05-22

## 2025-05-22 NOTE — TELEPHONE ENCOUNTER
Medication: Pravastatin    Dose/Frequency: 20mg bedtime    Quantity: 90 R3    Pharmacy: [facility]    Office:   [] PCP/Provider -   [x] Speciality/Provider - Dr Walter    Does the patient have enough for 3 days?   [] Yes   [x] No - Send as HP to POD      **updated labs being drawn this week**

## 2025-05-22 NOTE — TELEPHONE ENCOUNTER
Medication: Digoxin    Dose/Frequency: 0.125mg daily    Quantity: 90 R3    Pharmacy: [facility]    Office:   [] PCP/Provider -   [x] Speciality/Provider - PCP Dr Boyle    Does the patient have enough for 3 days?   [x] Yes   [] No - Send as HP to POD      **updated labs being drawn this week**

## 2025-05-22 NOTE — TELEPHONE ENCOUNTER
"Can a 2wk supply of Pravastatin be sent to [facility] while patient waiting for [facility] delivery?    Daughter Stefani scheduling appointment for mobile lab to do updated labs.    Refill requests sent for Pravastatin, Eliquis, Digoxin to refill team.    Reason for Disposition   Caller has medicine question only, adult not sick, and triager answers question     Reviewed chart, updated blood work needed to renew Pravastatin. Advised Stefani (daughter) orders were placed by PCP in March, which she was unaware of.    Patient uses mobile lab for draws, number provided to stefani and call transferred to same.    Advised will resubmit refill requests for Pravastatin, Eliquis, Digoxin to refill team, advise labs to be done. Requesting to be sent to [facility] with a short supply to [email or web address] for the Pravastatin, has 1 pill left.    Answer Assessment - Initial Assessment Questions  1. NAME of MEDICINE: \"What medicine(s) are you calling about?\"      Pravastatin  2. QUESTION: \"What is your question?\" (e.g., double dose of medicine, side effect)      Having issues obtaining a refill for this but not sure why  3. PRESCRIBER: \"Who prescribed the medicine?\" Reason: if prescribed by specialist, call should be referred to that group.      Dr Walter  4. SYMPTOMS: \"Do you have any symptoms?\" If Yes, ask: \"What symptoms are you having?\"  \"How bad are the symptoms (e.g., mild, moderate, severe)      denies    Protocols used: Medication Question Call-Adult-[place]    "

## 2025-05-22 NOTE — TELEPHONE ENCOUNTER
Medication: Eliquis    Dose/Frequency: 2.5mg 2x/day    Quantity: 180 R3    Pharmacy: [facility]    Office:   [] PCP/Provider -   [x] Speciality/Provider - Dr Walter    Does the patient have enough for 3 days?   [x] Yes   [] No - Send as HP to POD        **updated labs being drawn this week**

## 2025-05-23 RX ORDER — PRAVASTATIN SODIUM 20 MG
20 TABLET ORAL
Qty: 100 TABLET | Refills: 1 | OUTPATIENT
Start: 2025-05-23

## 2025-05-23 NOTE — TELEPHONE ENCOUNTER
90 day supply not appropriate from local pharmacy, was an emergency supply sent until mail order arrives.

## 2025-06-06 ENCOUNTER — LAB (OUTPATIENT)
Dept: LAB | Facility: HOSPITAL | Age: OVER 89
End: 2025-06-06
Payer: MEDICARE

## 2025-06-06 DIAGNOSIS — I48.91 ATRIAL FIBRILLATION, UNSPECIFIED TYPE (HCC): ICD-10-CM

## 2025-06-06 DIAGNOSIS — R26.2 AMBULATORY DYSFUNCTION: ICD-10-CM

## 2025-06-06 DIAGNOSIS — I10 PRIMARY HYPERTENSION: ICD-10-CM

## 2025-06-06 DIAGNOSIS — Z13.6 SCREENING FOR CARDIOVASCULAR CONDITION: ICD-10-CM

## 2025-06-06 DIAGNOSIS — E78.2 MIXED HYPERLIPIDEMIA: ICD-10-CM

## 2025-06-06 DIAGNOSIS — Z00.00 MEDICARE ANNUAL WELLNESS VISIT, SUBSEQUENT: ICD-10-CM

## 2025-06-06 DIAGNOSIS — Z86.73 HISTORY OF CVA (CEREBROVASCULAR ACCIDENT): ICD-10-CM

## 2025-06-06 DIAGNOSIS — H35.30 MACULAR DEGENERATION, UNSPECIFIED LATERALITY, UNSPECIFIED TYPE: ICD-10-CM

## 2025-06-06 LAB
ALBUMIN SERPL BCG-MCNC: 3.9 G/DL (ref 3.5–5)
ALP SERPL-CCNC: 119 U/L (ref 34–104)
ALT SERPL W P-5'-P-CCNC: 12 U/L (ref 7–52)
ANION GAP SERPL CALCULATED.3IONS-SCNC: 10 MMOL/L (ref 4–13)
AST SERPL W P-5'-P-CCNC: 21 U/L (ref 13–39)
BASOPHILS # BLD AUTO: 0.07 THOUSANDS/ÂΜL (ref 0–0.1)
BASOPHILS NFR BLD AUTO: 1 % (ref 0–1)
BILIRUB SERPL-MCNC: 0.66 MG/DL (ref 0.2–1)
BUN SERPL-MCNC: 20 MG/DL (ref 5–25)
CALCIUM SERPL-MCNC: 9.1 MG/DL (ref 8.4–10.2)
CHLORIDE SERPL-SCNC: 105 MMOL/L (ref 96–108)
CHOLEST SERPL-MCNC: 193 MG/DL (ref ?–200)
CO2 SERPL-SCNC: 28 MMOL/L (ref 21–32)
CREAT SERPL-MCNC: 0.67 MG/DL (ref 0.6–1.3)
EOSINOPHIL # BLD AUTO: 0.14 THOUSAND/ÂΜL (ref 0–0.61)
EOSINOPHIL NFR BLD AUTO: 2 % (ref 0–6)
ERYTHROCYTE [DISTWIDTH] IN BLOOD BY AUTOMATED COUNT: 14.8 % (ref 11.6–15.1)
GFR SERPL CREATININE-BSD FRML MDRD: 75 ML/MIN/1.73SQ M
GLUCOSE SERPL-MCNC: 67 MG/DL (ref 65–140)
HCT VFR BLD AUTO: 43.3 % (ref 34.8–46.1)
HDLC SERPL-MCNC: 76 MG/DL
HGB BLD-MCNC: 13.4 G/DL (ref 11.5–15.4)
IMM GRANULOCYTES # BLD AUTO: 0.02 THOUSAND/UL (ref 0–0.2)
IMM GRANULOCYTES NFR BLD AUTO: 0 % (ref 0–2)
LDLC SERPL CALC-MCNC: 98 MG/DL (ref 0–100)
LYMPHOCYTES # BLD AUTO: 1.75 THOUSANDS/ÂΜL (ref 0.6–4.47)
LYMPHOCYTES NFR BLD AUTO: 25 % (ref 14–44)
MCH RBC QN AUTO: 29.9 PG (ref 26.8–34.3)
MCHC RBC AUTO-ENTMCNC: 30.9 G/DL (ref 31.4–37.4)
MCV RBC AUTO: 97 FL (ref 82–98)
MONOCYTES # BLD AUTO: 0.56 THOUSAND/ÂΜL (ref 0.17–1.22)
MONOCYTES NFR BLD AUTO: 8 % (ref 4–12)
NEUTROPHILS # BLD AUTO: 4.52 THOUSANDS/ÂΜL (ref 1.85–7.62)
NEUTS SEG NFR BLD AUTO: 64 % (ref 43–75)
NONHDLC SERPL-MCNC: 117 MG/DL
NRBC BLD AUTO-RTO: 0 /100 WBCS
PLATELET # BLD AUTO: 217 THOUSANDS/UL (ref 149–390)
PMV BLD AUTO: 11.2 FL (ref 8.9–12.7)
POTASSIUM SERPL-SCNC: 4.3 MMOL/L (ref 3.5–5.3)
PROT SERPL-MCNC: 6.6 G/DL (ref 6.4–8.4)
RBC # BLD AUTO: 4.48 MILLION/UL (ref 3.81–5.12)
SODIUM SERPL-SCNC: 143 MMOL/L (ref 135–147)
TRIGL SERPL-MCNC: 95 MG/DL (ref ?–150)
TSH SERPL DL<=0.05 MIU/L-ACNC: 2.54 UIU/ML (ref 0.45–4.5)
WBC # BLD AUTO: 7.06 THOUSAND/UL (ref 4.31–10.16)

## 2025-06-06 PROCEDURE — 80061 LIPID PANEL: CPT

## 2025-06-06 PROCEDURE — 36415 COLL VENOUS BLD VENIPUNCTURE: CPT

## 2025-06-06 PROCEDURE — 84443 ASSAY THYROID STIM HORMONE: CPT

## 2025-06-06 PROCEDURE — 85025 COMPLETE CBC W/AUTO DIFF WBC: CPT

## 2025-06-06 PROCEDURE — 80053 COMPREHEN METABOLIC PANEL: CPT

## 2025-06-12 ENCOUNTER — RESULTS FOLLOW-UP (OUTPATIENT)
Dept: FAMILY MEDICINE CLINIC | Facility: CLINIC | Age: OVER 89
End: 2025-06-12

## 2025-08-21 DIAGNOSIS — E78.2 MIXED HYPERLIPIDEMIA: ICD-10-CM

## 2025-08-21 DIAGNOSIS — I48.91 ATRIAL FIBRILLATION, UNSPECIFIED TYPE (HCC): ICD-10-CM

## 2025-08-21 RX ORDER — PRAVASTATIN SODIUM 20 MG
20 TABLET ORAL
Qty: 90 TABLET | Refills: 0 | Status: SHIPPED | OUTPATIENT
Start: 2025-08-21

## 2025-08-21 RX ORDER — DIGOXIN 125 MCG
125 TABLET ORAL DAILY
Qty: 90 TABLET | Refills: 0 | Status: SHIPPED | OUTPATIENT
Start: 2025-08-21